# Patient Record
Sex: FEMALE | Race: WHITE | NOT HISPANIC OR LATINO | Employment: OTHER | URBAN - NONMETROPOLITAN AREA
[De-identification: names, ages, dates, MRNs, and addresses within clinical notes are randomized per-mention and may not be internally consistent; named-entity substitution may affect disease eponyms.]

---

## 2017-01-13 ENCOUNTER — TELEPHONE (OUTPATIENT)
Dept: OBSTETRICS AND GYNECOLOGY | Facility: CLINIC | Age: 62
End: 2017-01-13

## 2017-01-13 NOTE — TELEPHONE ENCOUNTER
Pt requesting a medication to be called in for low libido. Pt has not been seen since 12/2015. Informed she needs to come in to see Salud Suresh and then they can discuss the need for rx.

## 2017-01-24 ENCOUNTER — OFFICE VISIT (OUTPATIENT)
Dept: OBSTETRICS AND GYNECOLOGY | Facility: CLINIC | Age: 62
End: 2017-01-24

## 2017-01-24 VITALS
DIASTOLIC BLOOD PRESSURE: 84 MMHG | BODY MASS INDEX: 38.8 KG/M2 | WEIGHT: 219 LBS | HEIGHT: 63 IN | SYSTOLIC BLOOD PRESSURE: 136 MMHG

## 2017-01-24 DIAGNOSIS — N95.1 MENOPAUSAL STATE: ICD-10-CM

## 2017-01-24 DIAGNOSIS — R68.82 DECREASED LIBIDO: ICD-10-CM

## 2017-01-24 DIAGNOSIS — R53.83 FATIGUE, UNSPECIFIED TYPE: ICD-10-CM

## 2017-01-24 DIAGNOSIS — Z00.00 ANNUAL PHYSICAL EXAM: Primary | ICD-10-CM

## 2017-01-24 PROCEDURE — 99396 PREV VISIT EST AGE 40-64: CPT | Performed by: NURSE PRACTITIONER

## 2017-01-24 RX ORDER — ZOLPIDEM TARTRATE 5 MG/1
0.5 TABLET ORAL NIGHTLY PRN
COMMUNITY
End: 2019-06-14

## 2017-01-24 RX ORDER — CITALOPRAM 20 MG/1
TABLET ORAL
COMMUNITY
Start: 2017-01-02 | End: 2019-12-31

## 2017-01-24 RX ORDER — PANTOPRAZOLE SODIUM 40 MG/1
TABLET, DELAYED RELEASE ORAL
COMMUNITY
Start: 2016-12-19 | End: 2018-08-21

## 2017-01-24 RX ORDER — OMEGA-3S/DHA/EPA/FISH OIL/D3 300MG-1000
400 CAPSULE ORAL DAILY
COMMUNITY
End: 2018-08-29

## 2017-01-24 RX ORDER — LEVOTHYROXINE SODIUM 0.05 MG/1
50 TABLET ORAL DAILY
COMMUNITY
Start: 2016-12-03 | End: 2022-08-15

## 2017-01-24 RX ORDER — LOSARTAN POTASSIUM AND HYDROCHLOROTHIAZIDE 25; 100 MG/1; MG/1
TABLET ORAL
COMMUNITY
Start: 2017-01-03 | End: 2019-12-31

## 2017-01-24 RX ORDER — ROPINIROLE 0.5 MG/1
0.25 TABLET, FILM COATED ORAL DAILY
COMMUNITY
Start: 2016-12-19 | End: 2022-08-15

## 2017-01-24 RX ORDER — NICOTINE POLACRILEX 2 MG
1 LOZENGE BUCCAL DAILY
COMMUNITY
End: 2018-08-22

## 2017-01-24 RX ORDER — MONTELUKAST SODIUM 4 MG/1
1 TABLET, CHEWABLE ORAL DAILY
COMMUNITY
Start: 2016-12-03

## 2017-01-24 RX ORDER — ALLOPURINOL 100 MG/1
TABLET ORAL
COMMUNITY
Start: 2016-10-18 | End: 2019-12-31

## 2017-01-24 NOTE — MR AVS SNAPSHOT
Salud Daley   1/24/2017 1:00 PM   Office Visit    Dept Phone:  [phone]   Encounter #:  [id number]    Provider:  VIN Marcelo   Department:  [facility]                Your Full Care Plan              Your Updated Medication List          This list is accurate as of: 1/24/17  2:22 PM.  Always use your most recent med list.                allopurinol 100 MG tablet   Commonly known as:  ZYLOPRIM       AMBIEN 5 MG tablet   Generic drug:  zolpidem       B-12 5000 MCG sublingual tablet       cholecalciferol 400 UNITS tablet   Commonly known as:  VITAMIN D3       citalopram 20 MG tablet   Commonly known as:  CeleXA       colestipol 1 G tablet   Commonly known as:  COLESTID       Conj Estrogens-Bazedoxifene 0.45-20 MG tablet       levothyroxine 50 MCG tablet   Commonly known as:  SYNTHROID, LEVOTHROID       losartan-hydrochlorothiazide 100-25 MG per tablet   Commonly known as:  HYZAAR       pantoprazole 40 MG EC tablet   Commonly known as:  PROTONIX       rOPINIRole 0.5 MG tablet   Commonly known as:  REQUIP       ZICAM COLD REMEDY NA               We Performed the Following     Cortisol     DHEA-Sulfate     Estradiol     Estrone     Follicle Stimulating Hormone     Glucose, Plasma ([facility])     Hemoglobin A1c     Insulin, Random     Liquid-based Pap Smear, Screening     Luteinizing Hormone     Progesterone     T3, Free     T3, Reverse     T4, Free     Testosterone     Thyroid Antibodies     TSH     Vitamin D 25 Hydroxy       You Were Diagnosed With        Codes Comments    Annual physical exam    -  Primary ICD-10-CM: Z00.00  ICD-9-CM: V70.0 Normal well woman exam.  Mammogram done in August - wnl.     Menopausal state     ICD-10-CM: N95.1  ICD-9-CM: 627.2 Reports occasional hot flashes.  Hormone panel done today - will send to [facility] for compounding.      Decreased libido     ICD-10-CM: R68.82  ICD-9-CM: 799.81 Reports ongoing hypoactive sexual  desire.  Will do hormone panel today and follow up pending results.     Fatigue, unspecified type     ICD-10-CM: R53.83  ICD-9-CM: 780.79 Reports ongoing fatigue.  Lab work done today.  Follow up pending results.       Instructions     None    Patient Instructions History      Upcoming Appointments     Visit Type Date Time Department    PHYSICAL 2017  1:00 PM [facility] YANY KIM    [facility]      [employer] Signup     [facility] [employer] allows you to send messages to your doctor, view your test results, renew your prescriptions, schedule appointments, and more. To sign up, go to [employer] and click on the Sign Up Now link in the New User? box. Enter your [employer] Activation Code exactly as it appears below along with the last four digits of your Social Security Number and your Date of Birth () to complete the sign-up process. If you do not sign up before the expiration date, you must request a new code.    [employer] Activation Code: [email or web address]  Expires: 2017  2:22 PM    If you have questions, you can email [email or web address] or call [phone] to talk to our [employer] staff. Remember, [employer] is NOT to be used for urgent needs. For medical emergencies, dial 911.               Other Info from Your Visit           Your Appointments     2017  1:00 PM CDT   Office Visit with VIN Marcelo   [facility] (--)    [address] [place] [id number] [phone]           Arrive 15 minutes prior to appointment.              Allergies     Fluoxetine  Other (See Comments)    Adverse reaction-suicidal    Latex  Other (See Comments)    Gloves turn skin red    Nickel      Iodine  Rash      Reason for Visit     Gynecologic Exam Here for annual exam.  Had Hysterectomy for menorrhagia. Needs refills on her compounded hormones. Had mammogram Aug 2016. Wants to talk about low libido.        Vital  "Signs     Blood Pressure Height Weight Breastfeeding? Body Mass Index Smoking Status    136/84 (BP Location: Left arm, Patient Position: Sitting, Cuff Size: Adult) 63\" (160 cm) 219 lb (99.3 kg) No 38.79 kg/m2 Never Smoker      Problems and Diagnoses Noted     Annual physical exam    -  Primary    Menopausal state        Libido problem        Tiredness            "

## 2017-01-24 NOTE — PROGRESS NOTES
Subjective   Salud Daley is a 61 y.o. female  YOB: 1955    Est PT is here today for yearly visit.  Pt states that she is doing good with no c/o  Pt does need order for Mammo today as well      Chief Complaint   Patient presents with   • Gynecologic Exam     Here for annual exam.  Had Hysterectomy for menorrhagia. Needs refills on her compounded hormones. Had mammogram Aug 2016. Wants to talk about low libido.         HPI    The following portions of the patient's history were reviewed and updated as appropriate: allergies, current medications, past family history, past medical history, past social history, past surgical history and problem list.    Allergies   Allergen Reactions   • Fluoxetine Other (See Comments)     Adverse reaction-suicidal   • Latex Other (See Comments)     Gloves turn skin red   • Nickel    • Iodine Rash       Past Medical History   Diagnosis Date   • Asthma    • Hypertension    • Hypothyroid    • Insomnia    • Macular degeneration    • Restless leg syndrome        Family History   Problem Relation Age of Onset   • Breast cancer Neg Hx    • Ovarian cancer Neg Hx    • Colon cancer Neg Hx        Social History     Social History   • Marital status:      Spouse name: N/A   • Number of children: N/A   • Years of education: N/A     Occupational History   • Not on file.     Social History Main Topics   • Smoking status: Never Smoker   • Smokeless tobacco: Never Used   • Alcohol use Yes      Comment: Occasional   • Drug use: No   • Sexual activity: Not on file     Other Topics Concern   • Not on file     Social History Narrative         Current Outpatient Prescriptions:   •  allopurinol (ZYLOPRIM) 100 MG tablet, , Disp: , Rfl:   •  cholecalciferol (VITAMIN D3) 400 UNITS tablet, Take 400 Units by mouth Daily., Disp: , Rfl:   •  citalopram (CeleXA) 20 MG tablet, , Disp: , Rfl:   •  colestipol (COLESTID) 1 G tablet, , Disp: , Rfl:   •  Conj Estrogens-Bazedoxifene 0.45-20 MG  tablet, Take by mouth, Disp: , Rfl:   •  Cyanocobalamin (B-12) 5000 MCG sublingual tablet, Place 1 teaspoon(s) under the tongue Daily., Disp: , Rfl:   •  Homeopathic Products (ZICAM COLD REMEDY NA), 1 tablet/day into each nostril Daily., Disp: , Rfl:   •  levothyroxine (SYNTHROID, LEVOTHROID) 50 MCG tablet, , Disp: , Rfl:   •  losartan-hydrochlorothiazide (HYZAAR) 100-25 MG per tablet, , Disp: , Rfl:   •  pantoprazole (PROTONIX) 40 MG EC tablet, , Disp: , Rfl:   •  rOPINIRole (REQUIP) 0.5 MG tablet, , Disp: , Rfl:   •  zolpidem (AMBIEN) 5 MG tablet, Take 5 mg by mouth At Night As Needed for sleep (As needed)., Disp: , Rfl:     Menstrual History:  OB History      Para Term  AB TAB SAB Ectopic Multiple Living    1 0 0 0 0 0 0 0 1 2           No LMP recorded. Patient has had a hysterectomy.    Sexual History:         Could not be calculated    Past Surgical History   Procedure Laterality Date   • Ankle surgery Left    • Mole removal     • Endoscopy     • Cholecystectomy     • Colonoscopy     • Hysterectomy       Menorrhagia   • Cervical biopsy  w/ loop electrode excision     • Dilatation and curettage         Review of Systems   Constitutional: Negative for activity change, appetite change, chills, diaphoresis, fatigue, fever and unexpected weight change.   HENT: Negative for congestion, ear discharge, ear pain, facial swelling, hearing loss, mouth sores, nosebleeds, postnasal drip, rhinorrhea, sinus pressure, sneezing, sore throat, tinnitus, trouble swallowing and voice change.    Eyes: Negative for photophobia, pain, discharge, redness, itching and visual disturbance.   Respiratory: Negative for apnea, cough, choking, chest tightness and shortness of breath.    Cardiovascular: Negative for chest pain, palpitations and leg swelling.   Gastrointestinal: Negative for abdominal distention, abdominal pain, anal bleeding, blood in stool, constipation, diarrhea, nausea, rectal pain and vomiting.   Endocrine:  Negative for cold intolerance and heat intolerance.   Genitourinary: Negative for decreased urine volume, difficulty urinating, dyspareunia, flank pain, frequency, genital sores, hematuria, menstrual problem, pelvic pain, urgency, vaginal bleeding, vaginal discharge and vaginal pain.   Musculoskeletal: Negative for arthralgias, back pain, joint swelling and myalgias.   Skin: Negative for color change and rash.   Allergic/Immunologic: Negative for environmental allergies.   Neurological: Negative for dizziness, syncope, weakness, numbness and headaches.   Hematological: Negative for adenopathy.   Psychiatric/Behavioral: Negative for agitation, confusion and sleep disturbance. The patient is not nervous/anxious.        Objective   Physical Exam   Constitutional: She is oriented to person, place, and time. She appears well-developed and well-nourished.   HENT:   Head: Normocephalic.   Right Ear: External ear normal.   Left Ear: External ear normal.   Nose: Nose normal.   Mouth/Throat: Oropharynx is clear and moist.   Eyes: Conjunctivae are normal. Pupils are equal, round, and reactive to light.   Neck: Normal range of motion. Neck supple. Carotid bruit is not present. No thyromegaly present.   Cardiovascular: Regular rhythm, normal heart sounds and intact distal pulses.    No murmur heard.  Pulmonary/Chest: Effort normal and breath sounds normal. No respiratory distress. Right breast exhibits no inverted nipple, no mass, no nipple discharge, no skin change and no tenderness. Left breast exhibits no inverted nipple, no mass, no nipple discharge, no skin change and no tenderness. Breasts are symmetrical. There is no breast swelling.   Abdominal: Soft. Bowel sounds are normal. She exhibits no distension and no mass. There is no tenderness. There is no guarding. No hernia. Hernia confirmed negative in the right inguinal area and confirmed negative in the left inguinal area.   Genitourinary: Vagina normal. Rectal exam shows  "no external hemorrhoid, no internal hemorrhoid, no fissure, no mass, no tenderness and anal tone normal. No breast tenderness, discharge or bleeding. There is no rash, tenderness, lesion or injury on the right labia. There is no rash, tenderness, lesion or injury on the left labia. No vaginal discharge found.   Genitourinary Comments: Cervix, Uterus and Adnexa are surgically absent.  Urethra and urethral meatus normal  Bladder, normal no prolapse  Perineum and anus examined and without lesions   Musculoskeletal: Normal range of motion. She exhibits no edema or tenderness.   Lymphadenopathy:        Head (right side): No submental, no submandibular, no tonsillar, no preauricular, no posterior auricular and no occipital adenopathy present.        Head (left side): No submental, no submandibular, no tonsillar, no preauricular, no posterior auricular and no occipital adenopathy present.     She has no cervical adenopathy.        Right cervical: No superficial cervical, no deep cervical and no posterior cervical adenopathy present.       Left cervical: No superficial cervical, no deep cervical and no posterior cervical adenopathy present.     She has no axillary adenopathy.        Right: No inguinal adenopathy present.        Left: No inguinal adenopathy present.   Neurological: She is alert and oriented to person, place, and time. Coordination normal.   Skin: Skin is warm and dry. No bruising and no rash noted. No erythema.   Psychiatric: She has a normal mood and affect. Her behavior is normal. Judgment and thought content normal.   Nursing note and vitals reviewed.        Vitals:    01/24/17 1319   BP: 136/84   BP Location: Left arm   Patient Position: Sitting   Cuff Size: Adult   Weight: 219 lb (99.3 kg)   Height: 63\" (160 cm)       Salud was seen today for gynecologic exam.    Diagnoses and all orders for this visit:    Annual physical exam  Comments:  Normal well woman exam.  Mammogram done in August - wnl. "   Orders:  -     Liquid-based Pap Smear, Screening  -     Cortisol  -     DHEA-Sulfate  -     Estradiol  -     Follicle Stimulating Hormone  -     Luteinizing Hormone  -     Progesterone  -     Testosterone  -     Estrone  -     TSH  -     T3, Free  -     T4, Free  -     T3, Reverse  -     Thyroid Antibodies  -     Vitamin D 25 Hydroxy  -     Glucose, Plasma ([facility])  -     Insulin, Random  -     Hemoglobin A1c    Menopausal state  Comments:  Reports occasional hot flashes.  Hormone panel done today - will send to \[facility]\"" for compounding.    Orders:  -     Liquid-based Pap Smear, Screening  -     Cortisol  -     DHEA-Sulfate  -     Estradiol  -     Follicle Stimulating Hormone  -     Luteinizing Hormone  -     Progesterone  -     Testosterone  -     Estrone  -     TSH  -     T3, Free  -     T4, Free  -     T3, Reverse  -     Thyroid Antibodies  -     Vitamin D 25 Hydroxy  -     Glucose, Plasma ([facility])  -     Insulin, Random  -     Hemoglobin A1c    Decreased libido  Comments:  Reports ongoing hypoactive sexual desire.  Will do hormone panel today and follow up pending results.   Orders:  -     Estrone  -     TSH  -     T3, Free  -     T4, Free  -     T3, Reverse  -     Thyroid Antibodies  -     Vitamin D 25 Hydroxy  -     Glucose, Plasma ([facility])  -     Insulin, Random  -     Hemoglobin A1c    Fatigue, unspecified type  Comments:  Reports ongoing fatigue.  Lab work done today.  Follow up pending results.   Orders:  -     Estrone  -     TSH  -     T3, Free  -     T4, Free  -     T3, Reverse  -     Thyroid Antibodies  -     Vitamin D 25 Hydroxy  -     Glucose, Plasma ([facility])  -     Insulin, Random  -     Hemoglobin A1c        Body mass index is 38.79 kg/(m^2).     Non-Smoker    MyChart Instructions Given

## 2017-01-28 LAB
25(OH)D3+25(OH)D2 SERPL-MCNC: 31 NG/ML (ref 30–100)
CORTIS SERPL-MCNC: 7.8 UG/DL
DHEA-S SERPL-MCNC: 53.4 UG/DL (ref 29.4–220.5)
ESTRADIOL SERPL-MCNC: 21.5 PG/ML
ESTRONE SERPL-MCNC: 135 PG/ML
FSH SERPL-ACNC: 35.5 MIU/ML
GLUCOSE P FAST SERPL-MCNC: 82 MG/DL (ref 65–99)
HBA1C MFR BLD: 6.2 % (ref 4.8–5.6)
INSULIN SERPL-ACNC: 9.1 UIU/ML
LH SERPL-ACNC: 30.8 MIU/ML
PROGEST SERPL-MCNC: 0.3 NG/ML
T3FREE SERPL-MCNC: 2.5 PG/ML (ref 2–4.4)
T3REVERSE SERPL-MCNC: 20 NG/DL (ref 9.2–24.1)
T4 FREE SERPL-MCNC: 1.14 NG/DL (ref 0.82–1.77)
TESTOST SERPL-MCNC: 8 NG/DL (ref 3–41)
THYROGLOB AB SERPL-ACNC: <1 IU/ML (ref 0–0.9)
THYROPEROXIDASE AB SERPL-ACNC: 8 IU/ML (ref 0–34)
TSH SERPL DL<=0.005 MIU/L-ACNC: 2.81 UIU/ML (ref 0.45–4.5)

## 2017-01-30 ENCOUNTER — TELEPHONE (OUTPATIENT)
Dept: OBSTETRICS AND GYNECOLOGY | Facility: CLINIC | Age: 62
End: 2017-01-30

## 2017-01-30 NOTE — TELEPHONE ENCOUNTER
I left her a message about the hormone panel to [facility] and to expect a call from them.  Also left a message for her to call back for a discussion of her other labs with me.

## 2017-01-31 ENCOUNTER — TELEPHONE (OUTPATIENT)
Dept: OBSTETRICS AND GYNECOLOGY | Facility: CLINIC | Age: 62
End: 2017-01-31

## 2017-02-27 ENCOUNTER — ANESTHESIA EVENT (OUTPATIENT)
Dept: OPERATING ROOM | Age: 62
End: 2017-02-27

## 2017-02-28 ENCOUNTER — HOSPITAL ENCOUNTER (OUTPATIENT)
Dept: NON INVASIVE DIAGNOSTICS | Age: 62
Discharge: HOME | End: 2017-02-28
Payer: COMMERCIAL

## 2017-02-28 ENCOUNTER — HOSPITAL ENCOUNTER (OUTPATIENT)
Dept: LAB | Age: 62
Discharge: HOME | End: 2017-02-28
Payer: COMMERCIAL

## 2017-02-28 ENCOUNTER — HOSPITAL ENCOUNTER (OUTPATIENT)
Dept: PREADMISSION TESTING | Age: 62
Setting detail: OUTPATIENT SURGERY
Discharge: HOME | End: 2017-02-28

## 2017-02-28 VITALS — BODY MASS INDEX: 41.41 KG/M2 | HEIGHT: 62 IN | WEIGHT: 225 LBS

## 2017-02-28 DIAGNOSIS — Z01.818 PRE-OP TESTING: Primary | ICD-10-CM

## 2017-02-28 DIAGNOSIS — Z01.818 PRE-OP TESTING: ICD-10-CM

## 2017-02-28 LAB
ANION GAP SERPL CALCULATED.3IONS-SCNC: 18 MMOL/L (ref 7–19)
BUN BLDV-MCNC: 19 MG/DL (ref 8–23)
CALCIUM SERPL-MCNC: 8.8 MG/DL (ref 8.8–10.2)
CHLORIDE BLD-SCNC: 101 MMOL/L (ref 98–111)
CO2: 25 MMOL/L (ref 22–29)
CREAT SERPL-MCNC: 0.7 MG/DL (ref 0.5–0.9)
GFR NON-AFRICAN AMERICAN: >60
GLUCOSE BLD-MCNC: 110 MG/DL (ref 74–109)
POTASSIUM SERPL-SCNC: 4 MMOL/L (ref 3.5–5)
SODIUM BLD-SCNC: 144 MMOL/L (ref 136–145)

## 2017-02-28 PROCEDURE — 93005 ELECTROCARDIOGRAM TRACING: CPT

## 2017-03-09 ENCOUNTER — HOSPITAL ENCOUNTER (OUTPATIENT)
Age: 62
Setting detail: SPECIMEN
Discharge: HOME | End: 2017-03-09
Payer: COMMERCIAL

## 2017-03-09 ENCOUNTER — ANESTHESIA (OUTPATIENT)
Dept: OPERATING ROOM | Age: 62
End: 2017-03-09
Payer: COMMERCIAL

## 2017-03-09 ENCOUNTER — SURGERY (OUTPATIENT)
Age: 62
End: 2017-03-09

## 2017-03-09 ENCOUNTER — HOSPITAL ENCOUNTER (OUTPATIENT)
Age: 62
Setting detail: OUTPATIENT SURGERY
Discharge: HOME | End: 2017-03-09
Attending: ORTHOPAEDIC SURGERY | Admitting: ORTHOPAEDIC SURGERY

## 2017-03-09 VITALS
DIASTOLIC BLOOD PRESSURE: 62 MMHG | OXYGEN SATURATION: 92 % | RESPIRATION RATE: 16 BRPM | HEART RATE: 67 BPM | TEMPERATURE: 97.4 F | SYSTOLIC BLOOD PRESSURE: 104 MMHG

## 2017-03-09 VITALS
SYSTOLIC BLOOD PRESSURE: 90 MMHG | OXYGEN SATURATION: 64 % | RESPIRATION RATE: 16 BRPM | DIASTOLIC BLOOD PRESSURE: 52 MMHG

## 2017-03-09 PROBLEM — M67.40 GANGLION CYST: Status: ACTIVE | Noted: 2017-03-09

## 2017-03-09 PROCEDURE — 88304 TISSUE EXAM BY PATHOLOGIST: CPT

## 2017-03-09 PROCEDURE — 25111 REMOVE WRIST TENDON LESION: CPT

## 2017-03-09 PROCEDURE — 25075 EXC FOREARM LES SC < 3 CM: CPT

## 2017-03-09 PROCEDURE — 01810 ANES PX NRV MUSC F/ARM WRST: CPT | Performed by: NURSE ANESTHETIST, CERTIFIED REGISTERED

## 2017-03-09 PROCEDURE — 88307 TISSUE EXAM BY PATHOLOGIST: CPT

## 2017-03-09 PROCEDURE — G8916 PT W IV AB GIVEN ON TIME: HCPCS

## 2017-03-09 PROCEDURE — G8907 PT DOC NO EVENTS ON DISCHARG: HCPCS

## 2017-03-09 RX ORDER — FENTANYL CITRATE 50 UG/ML
50 INJECTION, SOLUTION INTRAMUSCULAR; INTRAVENOUS EVERY 5 MIN PRN
Status: DISCONTINUED | OUTPATIENT
Start: 2017-03-09 | End: 2017-03-09 | Stop reason: HOSPADM

## 2017-03-09 RX ORDER — OXYCODONE HYDROCHLORIDE AND ACETAMINOPHEN 5; 325 MG/1; MG/1
2 TABLET ORAL PRN
Status: DISCONTINUED | OUTPATIENT
Start: 2017-03-09 | End: 2017-03-09 | Stop reason: HOSPADM

## 2017-03-09 RX ORDER — FENTANYL CITRATE 50 UG/ML
INJECTION, SOLUTION INTRAMUSCULAR; INTRAVENOUS PRN
Status: DISCONTINUED | OUTPATIENT
Start: 2017-03-09 | End: 2017-03-09 | Stop reason: SDUPTHER

## 2017-03-09 RX ORDER — HYDROCODONE BITARTRATE AND ACETAMINOPHEN 7.5; 325 MG/1; MG/1
1 TABLET ORAL EVERY 4 HOURS PRN
Qty: 60 TABLET | Refills: 0 | Status: SHIPPED | OUTPATIENT
Start: 2017-03-09 | End: 2017-03-16

## 2017-03-09 RX ORDER — BUPIVACAINE HYDROCHLORIDE 2.5 MG/ML
INJECTION, SOLUTION EPIDURAL; INFILTRATION; INTRACAUDAL PRN
Status: DISCONTINUED | OUTPATIENT
Start: 2017-03-09 | End: 2017-03-09 | Stop reason: HOSPADM

## 2017-03-09 RX ORDER — OXYCODONE HYDROCHLORIDE AND ACETAMINOPHEN 5; 325 MG/1; MG/1
1 TABLET ORAL PRN
Status: DISCONTINUED | OUTPATIENT
Start: 2017-03-09 | End: 2017-03-09 | Stop reason: HOSPADM

## 2017-03-09 RX ORDER — PROPOFOL 10 MG/ML
INJECTION, EMULSION INTRAVENOUS PRN
Status: DISCONTINUED | OUTPATIENT
Start: 2017-03-09 | End: 2017-03-09 | Stop reason: SDUPTHER

## 2017-03-09 RX ORDER — SODIUM CHLORIDE, SODIUM LACTATE, POTASSIUM CHLORIDE, CALCIUM CHLORIDE 600; 310; 30; 20 MG/100ML; MG/100ML; MG/100ML; MG/100ML
INJECTION, SOLUTION INTRAVENOUS CONTINUOUS
Status: DISCONTINUED | OUTPATIENT
Start: 2017-03-09 | End: 2017-03-09 | Stop reason: HOSPADM

## 2017-03-09 RX ORDER — CEFAZOLIN SODIUM 1 G/3ML
INJECTION, POWDER, FOR SOLUTION INTRAMUSCULAR; INTRAVENOUS PRN
Status: DISCONTINUED | OUTPATIENT
Start: 2017-03-09 | End: 2017-03-09 | Stop reason: SDUPTHER

## 2017-03-09 RX ORDER — HYDRALAZINE HYDROCHLORIDE 20 MG/ML
5 INJECTION INTRAMUSCULAR; INTRAVENOUS EVERY 10 MIN PRN
Status: DISCONTINUED | OUTPATIENT
Start: 2017-03-09 | End: 2017-03-09 | Stop reason: HOSPADM

## 2017-03-09 RX ORDER — LOSARTAN POTASSIUM AND HYDROCHLOROTHIAZIDE 25; 100 MG/1; MG/1
1 TABLET ORAL DAILY
COMMUNITY
End: 2022-06-03

## 2017-03-09 RX ORDER — HYDROMORPHONE HCL 110MG/55ML
0.25 PATIENT CONTROLLED ANALGESIA SYRINGE INTRAVENOUS EVERY 5 MIN PRN
Status: DISCONTINUED | OUTPATIENT
Start: 2017-03-09 | End: 2017-03-09 | Stop reason: HOSPADM

## 2017-03-09 RX ORDER — MEPERIDINE HYDROCHLORIDE 25 MG/ML
12.5 INJECTION INTRAMUSCULAR; INTRAVENOUS; SUBCUTANEOUS EVERY 5 MIN PRN
Status: DISCONTINUED | OUTPATIENT
Start: 2017-03-09 | End: 2017-03-09 | Stop reason: HOSPADM

## 2017-03-09 RX ORDER — HYDROCODONE BITARTRATE AND ACETAMINOPHEN 5; 325 MG/1; MG/1
2 TABLET ORAL PRN
Status: DISCONTINUED | OUTPATIENT
Start: 2017-03-09 | End: 2017-03-09 | Stop reason: HOSPADM

## 2017-03-09 RX ORDER — DIPHENHYDRAMINE HYDROCHLORIDE 50 MG/ML
12.5 INJECTION INTRAMUSCULAR; INTRAVENOUS
Status: DISCONTINUED | OUTPATIENT
Start: 2017-03-09 | End: 2017-03-09 | Stop reason: HOSPADM

## 2017-03-09 RX ORDER — SODIUM CHLORIDE, SODIUM LACTATE, POTASSIUM CHLORIDE, CALCIUM CHLORIDE 600; 310; 30; 20 MG/100ML; MG/100ML; MG/100ML; MG/100ML
INJECTION, SOLUTION INTRAVENOUS CONTINUOUS PRN
Status: DISCONTINUED | OUTPATIENT
Start: 2017-03-09 | End: 2017-03-09 | Stop reason: SDUPTHER

## 2017-03-09 RX ORDER — HYDROCODONE BITARTRATE AND ACETAMINOPHEN 5; 325 MG/1; MG/1
1 TABLET ORAL PRN
Status: DISCONTINUED | OUTPATIENT
Start: 2017-03-09 | End: 2017-03-09 | Stop reason: HOSPADM

## 2017-03-09 RX ORDER — LIDOCAINE HYDROCHLORIDE 10 MG/ML
1 INJECTION, SOLUTION EPIDURAL; INFILTRATION; INTRACAUDAL; PERINEURAL
Status: DISCONTINUED | OUTPATIENT
Start: 2017-03-09 | End: 2017-03-09 | Stop reason: HOSPADM

## 2017-03-09 RX ORDER — ONDANSETRON 2 MG/ML
4 INJECTION INTRAMUSCULAR; INTRAVENOUS
Status: DISCONTINUED | OUTPATIENT
Start: 2017-03-09 | End: 2017-03-09 | Stop reason: HOSPADM

## 2017-03-09 RX ORDER — HYDROMORPHONE HCL 110MG/55ML
0.5 PATIENT CONTROLLED ANALGESIA SYRINGE INTRAVENOUS EVERY 5 MIN PRN
Status: DISCONTINUED | OUTPATIENT
Start: 2017-03-09 | End: 2017-03-09 | Stop reason: HOSPADM

## 2017-03-09 RX ORDER — LABETALOL HYDROCHLORIDE 5 MG/ML
5 INJECTION, SOLUTION INTRAVENOUS EVERY 10 MIN PRN
Status: DISCONTINUED | OUTPATIENT
Start: 2017-03-09 | End: 2017-03-09 | Stop reason: HOSPADM

## 2017-03-09 RX ORDER — PROMETHAZINE HYDROCHLORIDE 25 MG/ML
12.5 INJECTION, SOLUTION INTRAMUSCULAR; INTRAVENOUS
Status: DISCONTINUED | OUTPATIENT
Start: 2017-03-09 | End: 2017-03-09 | Stop reason: HOSPADM

## 2017-03-09 RX ORDER — MIDAZOLAM HYDROCHLORIDE 1 MG/ML
INJECTION INTRAMUSCULAR; INTRAVENOUS PRN
Status: DISCONTINUED | OUTPATIENT
Start: 2017-03-09 | End: 2017-03-09 | Stop reason: SDUPTHER

## 2017-03-09 RX ORDER — ONDANSETRON 2 MG/ML
INJECTION INTRAMUSCULAR; INTRAVENOUS PRN
Status: DISCONTINUED | OUTPATIENT
Start: 2017-03-09 | End: 2017-03-09 | Stop reason: SDUPTHER

## 2017-03-09 RX ADMIN — SODIUM CHLORIDE, SODIUM LACTATE, POTASSIUM CHLORIDE, CALCIUM CHLORIDE: 600; 310; 30; 20 INJECTION, SOLUTION INTRAVENOUS at 07:57

## 2017-03-09 RX ADMIN — BUPIVACAINE HYDROCHLORIDE 8 ML: 2.5 INJECTION, SOLUTION EPIDURAL; INFILTRATION; INTRACAUDAL at 08:40

## 2017-03-09 RX ADMIN — SODIUM CHLORIDE, SODIUM LACTATE, POTASSIUM CHLORIDE, CALCIUM CHLORIDE: 600; 310; 30; 20 INJECTION, SOLUTION INTRAVENOUS at 07:00

## 2017-03-09 RX ADMIN — ONDANSETRON 4 MG: 2 INJECTION INTRAMUSCULAR; INTRAVENOUS at 08:24

## 2017-03-09 RX ADMIN — PROPOFOL 120 MG: 10 INJECTION, EMULSION INTRAVENOUS at 08:09

## 2017-03-09 RX ADMIN — MIDAZOLAM HYDROCHLORIDE 2 MG: 1 INJECTION INTRAMUSCULAR; INTRAVENOUS at 07:57

## 2017-03-09 RX ADMIN — CEFAZOLIN SODIUM 1000 MG: 1 INJECTION, POWDER, FOR SOLUTION INTRAMUSCULAR; INTRAVENOUS at 07:57

## 2017-03-09 RX ADMIN — FENTANYL CITRATE 100 MCG: 50 INJECTION, SOLUTION INTRAMUSCULAR; INTRAVENOUS at 08:03

## 2017-03-09 RX ADMIN — Medication 0.25 MG: at 09:37

## 2017-03-09 ASSESSMENT — PAIN SCALES - GENERAL: PAINLEVEL_OUTOF10: 8

## 2017-04-25 ENCOUNTER — OFFICE VISIT (OUTPATIENT)
Dept: OBSTETRICS AND GYNECOLOGY | Facility: CLINIC | Age: 62
End: 2017-04-25

## 2017-04-25 VITALS
SYSTOLIC BLOOD PRESSURE: 138 MMHG | HEIGHT: 63 IN | DIASTOLIC BLOOD PRESSURE: 80 MMHG | BODY MASS INDEX: 36.14 KG/M2 | WEIGHT: 204 LBS

## 2017-04-25 DIAGNOSIS — E61.1 LOW IRON: ICD-10-CM

## 2017-04-25 DIAGNOSIS — N95.1 MENOPAUSAL STATE: Primary | ICD-10-CM

## 2017-04-25 DIAGNOSIS — E55.9 VITAMIN D DEFICIENCY: ICD-10-CM

## 2017-04-25 DIAGNOSIS — E53.8 B12 DEFICIENCY: ICD-10-CM

## 2017-04-25 PROCEDURE — 99213 OFFICE O/P EST LOW 20 MIN: CPT | Performed by: NURSE PRACTITIONER

## 2017-04-25 RX ORDER — FERROUS SULFATE 325(65) MG
TABLET ORAL
COMMUNITY
End: 2018-08-29

## 2017-04-25 NOTE — PROGRESS NOTES
Subjective   Salud aDley is a 61 y.o. female.     History of Present Illness    The following portions of the patient's history were reviewed and updated as appropriate: allergies, current medications, past family history, past medical history, past social history, past surgical history and problem list.    Review of Systems   Constitutional: Negative for activity change, appetite change, chills, diaphoresis, fatigue, fever and unexpected weight change.   HENT: Negative for congestion, ear discharge, ear pain, facial swelling, hearing loss, mouth sores, nosebleeds, postnasal drip, rhinorrhea, sinus pressure, sneezing, sore throat, tinnitus, trouble swallowing and voice change.    Eyes: Negative for photophobia, pain, discharge, redness, itching and visual disturbance.   Respiratory: Negative for apnea, cough, choking, chest tightness and shortness of breath.    Cardiovascular: Negative for chest pain, palpitations and leg swelling.   Gastrointestinal: Negative for abdominal distention, abdominal pain, anal bleeding, blood in stool, constipation, diarrhea, nausea, rectal pain and vomiting.   Endocrine: Negative for cold intolerance and heat intolerance.   Genitourinary: Negative for decreased urine volume, difficulty urinating, dyspareunia, flank pain, frequency, genital sores, hematuria, menstrual problem, pelvic pain, urgency, vaginal bleeding, vaginal discharge and vaginal pain.   Musculoskeletal: Negative for arthralgias, back pain, joint swelling and myalgias.   Skin: Negative for color change and rash.   Allergic/Immunologic: Negative for environmental allergies.   Neurological: Negative for dizziness, syncope, weakness, numbness and headaches.   Hematological: Negative for adenopathy.   Psychiatric/Behavioral: Negative for agitation, confusion and sleep disturbance. The patient is not nervous/anxious.        Objective   Physical Exam    Assessment/Plan   Salud was seen today for menopause.    Diagnoses and  all orders for this visit:    Menopausal state  Comments:  Doing well on compounded hormones.  Hormone panel today and will refill based on results.   Orders:  -     Cortisol  -     DHEA-Sulfate  -     Estradiol  -     Follicle Stimulating Hormone  -     Luteinizing Hormone  -     Progesterone  -     Testosterone    Vitamin D deficiency  Comments:  To repeat vitamin D level today.   Orders:  -     Vitamin D 25 Hydroxy    B12 deficiency  Comments:  Will check B12 level today.   Orders:  -     Vitamin B12    Low iron  Comments:  Hemoglobin done today.   Orders:  -     Hemoglobin

## 2017-04-26 LAB
25(OH)D3+25(OH)D2 SERPL-MCNC: 28.3 NG/ML (ref 30–100)
CORTIS SERPL-MCNC: 8.2 UG/DL
DHEA-S SERPL-MCNC: 61.3 UG/DL (ref 29.4–220.5)
ESTRADIOL SERPL-MCNC: 56.6 PG/ML
FSH SERPL-ACNC: 35.3 MIU/ML
HGB BLD-MCNC: 14.6 G/DL (ref 12–16)
LH SERPL-ACNC: 25.6 MIU/ML
PROGEST SERPL-MCNC: 2.9 NG/ML
TESTOST SERPL-MCNC: 26 NG/DL (ref 3–41)
VIT B12 SERPL-MCNC: >1000 PG/ML (ref 239–931)

## 2017-05-02 ENCOUNTER — APPOINTMENT (OUTPATIENT)
Dept: GENERAL RADIOLOGY | Facility: HOSPITAL | Age: 62
End: 2017-05-02

## 2017-05-02 PROCEDURE — 73610 X-RAY EXAM OF ANKLE: CPT

## 2017-07-11 DIAGNOSIS — E55.9 VITAMIN D DEFICIENCY: Primary | ICD-10-CM

## 2017-07-12 LAB — 25(OH)D3+25(OH)D2 SERPL-MCNC: 45.9 NG/ML (ref 30–100)

## 2017-07-25 ENCOUNTER — OFFICE VISIT (OUTPATIENT)
Dept: OBSTETRICS AND GYNECOLOGY | Facility: CLINIC | Age: 62
End: 2017-07-25

## 2017-07-25 VITALS
SYSTOLIC BLOOD PRESSURE: 120 MMHG | HEIGHT: 63 IN | WEIGHT: 199 LBS | BODY MASS INDEX: 35.26 KG/M2 | DIASTOLIC BLOOD PRESSURE: 76 MMHG

## 2017-07-25 DIAGNOSIS — N95.1 MENOPAUSAL STATE: Primary | ICD-10-CM

## 2017-07-25 PROCEDURE — 99213 OFFICE O/P EST LOW 20 MIN: CPT | Performed by: NURSE PRACTITIONER

## 2017-07-25 NOTE — PROGRESS NOTES
Subjective   Salud Daley is a 61 y.o. female.     History of Present Illness    The following portions of the patient's history were reviewed and updated as appropriate: allergies, current medications, past family history, past medical history, past social history, past surgical history and problem list.    Review of Systems   Constitutional: Negative for activity change, appetite change, chills, diaphoresis, fatigue, fever and unexpected weight change.   HENT: Negative for congestion, ear discharge, ear pain, facial swelling, hearing loss, mouth sores, nosebleeds, postnasal drip, rhinorrhea, sinus pressure, sneezing, sore throat, tinnitus, trouble swallowing and voice change.    Eyes: Negative for photophobia, pain, discharge, redness, itching and visual disturbance.   Respiratory: Negative for apnea, cough, choking, chest tightness and shortness of breath.    Cardiovascular: Negative for chest pain, palpitations and leg swelling.   Gastrointestinal: Negative for abdominal distention, abdominal pain, anal bleeding, blood in stool, constipation, diarrhea, nausea, rectal pain and vomiting.   Endocrine: Negative for cold intolerance and heat intolerance.   Genitourinary: Negative for decreased urine volume, difficulty urinating, dyspareunia, flank pain, frequency, genital sores, hematuria, menstrual problem, pelvic pain, urgency, vaginal bleeding, vaginal discharge and vaginal pain.   Musculoskeletal: Negative for arthralgias, back pain, joint swelling and myalgias.   Skin: Negative for color change and rash.   Allergic/Immunologic: Negative for environmental allergies.   Neurological: Negative for dizziness, syncope, weakness, numbness and headaches.   Hematological: Negative for adenopathy.   Psychiatric/Behavioral: Negative for agitation, confusion and sleep disturbance. The patient is not nervous/anxious.        Objective   Physical Exam   Constitutional: She is oriented to person, place, and time. She appears  well-developed and well-nourished.   Cardiovascular: Normal rate and regular rhythm.    Pulmonary/Chest: Effort normal and breath sounds normal.   Neurological: She is alert and oriented to person, place, and time.   Psychiatric: She has a normal mood and affect. Her behavior is normal.   Nursing note and vitals reviewed.      Assessment/Plan   Salud was seen today for menopause.    Diagnoses and all orders for this visit:    Menopausal state  Comments:  Patient doing well on her compounded hormones and wants to remain on it.  Refill called to the pharmacy.      BMI 35.0-35.9,adult  Comments:  Patient wants to discuss weight loss options.  Wants to try Saxenda.  Thoroughly discussed risks/benefits and proper use.  RX sent to pharmacy.  RTO 4 weeks for recheck or sooner prn.    Orders:  -     Insulin Pen Needle (NOVOFINE) 32G X 6 MM misc; 1 each Daily.  -     Liraglutide -Weight Management (SAXENDA) 18 MG/3ML solution pen-injector; Inject 3 mg under the skin Daily. 0.6 mg x 1 week, 1.2 mg x 1 week, 1.8 mg x 1 week, 2.4 mg x 1 week, then 3 mg daily

## 2017-08-01 ENCOUNTER — OFFICE VISIT (OUTPATIENT)
Dept: NEUROLOGY | Age: 62
End: 2017-08-01
Payer: COMMERCIAL

## 2017-08-01 VITALS
SYSTOLIC BLOOD PRESSURE: 124 MMHG | DIASTOLIC BLOOD PRESSURE: 67 MMHG | RESPIRATION RATE: 16 BRPM | WEIGHT: 202.4 LBS | HEART RATE: 67 BPM | BODY MASS INDEX: 37.25 KG/M2 | HEIGHT: 62 IN

## 2017-08-01 DIAGNOSIS — G47.33 SLEEP APNEA, OBSTRUCTIVE: Primary | ICD-10-CM

## 2017-08-01 PROCEDURE — 99213 OFFICE O/P EST LOW 20 MIN: CPT | Performed by: PSYCHIATRY & NEUROLOGY

## 2017-08-01 RX ORDER — CHOLECALCIFEROL (VITAMIN D3) 1250 MCG
CAPSULE ORAL WEEKLY
COMMUNITY
End: 2018-08-10

## 2017-08-01 RX ORDER — LANOLIN ALCOHOL/MO/W.PET/CERES
1000 CREAM (GRAM) TOPICAL DAILY
COMMUNITY
End: 2018-08-10

## 2017-08-01 RX ORDER — PANTOPRAZOLE SODIUM 40 MG/1
40 TABLET, DELAYED RELEASE ORAL DAILY
COMMUNITY
End: 2018-08-10

## 2017-08-01 RX ORDER — ALLOPURINOL 100 MG/1
100 TABLET ORAL DAILY
COMMUNITY

## 2017-08-10 ENCOUNTER — TELEPHONE (OUTPATIENT)
Dept: OBSTETRICS AND GYNECOLOGY | Facility: CLINIC | Age: 62
End: 2017-08-10

## 2017-08-10 NOTE — TELEPHONE ENCOUNTER
Patient called and states that Saxenda isn't covered under her insurance. Patient states she has been put on a diabetic diet and medication recently by PCP.. Patient states she has lost roughly 30lbs since then. Patient opted to keep appointment in October to discuss other options of weight management.

## 2017-10-11 ENCOUNTER — APPOINTMENT (OUTPATIENT)
Dept: LAB | Facility: HOSPITAL | Age: 62
End: 2017-10-11
Attending: INTERNAL MEDICINE

## 2017-10-11 ENCOUNTER — TRANSCRIBE ORDERS (OUTPATIENT)
Dept: ADMINISTRATIVE | Facility: HOSPITAL | Age: 62
End: 2017-10-11

## 2017-10-11 DIAGNOSIS — N20.0 URIC ACID NEPHROLITHIASIS: ICD-10-CM

## 2017-10-11 DIAGNOSIS — E79.0 URICACIDEMIA: ICD-10-CM

## 2017-10-11 DIAGNOSIS — I10 HYPERTENSION, ESSENTIAL: ICD-10-CM

## 2017-10-11 DIAGNOSIS — R31.29 MICROSCOPIC HEMATURIA: Primary | ICD-10-CM

## 2017-10-11 LAB
BILIRUB UR QL STRIP: NEGATIVE
CLARITY UR: CLEAR
COLOR UR: YELLOW
GLUCOSE UR STRIP-MCNC: NEGATIVE MG/DL
HGB UR QL STRIP.AUTO: NEGATIVE
KETONES UR QL STRIP: NEGATIVE
LEUKOCYTE ESTERASE UR QL STRIP.AUTO: NEGATIVE
NITRITE UR QL STRIP: NEGATIVE
PH UR STRIP.AUTO: 5.5 [PH] (ref 5–8)
PROT UR QL STRIP: NEGATIVE
SP GR UR STRIP: <=1.005 (ref 1–1.03)
UROBILINOGEN UR QL STRIP: NORMAL

## 2017-10-11 PROCEDURE — 36415 COLL VENOUS BLD VENIPUNCTURE: CPT

## 2017-10-11 PROCEDURE — 80069 RENAL FUNCTION PANEL: CPT | Performed by: INTERNAL MEDICINE

## 2017-10-11 PROCEDURE — 81003 URINALYSIS AUTO W/O SCOPE: CPT | Performed by: INTERNAL MEDICINE

## 2017-10-11 PROCEDURE — 84550 ASSAY OF BLOOD/URIC ACID: CPT | Performed by: INTERNAL MEDICINE

## 2017-10-11 PROCEDURE — 83735 ASSAY OF MAGNESIUM: CPT | Performed by: INTERNAL MEDICINE

## 2017-10-12 LAB
ALBUMIN SERPL-MCNC: 4.3 G/DL (ref 3.5–5)
ANION GAP SERPL CALCULATED.3IONS-SCNC: 15 MMOL/L (ref 4–13)
BUN BLD-MCNC: 23 MG/DL (ref 5–21)
BUN/CREAT SERPL: 32.9 (ref 7–25)
CALCIUM SPEC-SCNC: 9.2 MG/DL (ref 8.4–10.4)
CHLORIDE SERPL-SCNC: 97 MMOL/L (ref 98–110)
CO2 SERPL-SCNC: 28 MMOL/L (ref 24–31)
CREAT BLD-MCNC: 0.7 MG/DL (ref 0.5–1.4)
GFR SERPL CREATININE-BSD FRML MDRD: 85 ML/MIN/1.73
GLUCOSE BLD-MCNC: 104 MG/DL (ref 70–100)
MAGNESIUM SERPL-MCNC: 2 MG/DL (ref 1.4–2.2)
PHOSPHATE SERPL-MCNC: 3.2 MG/DL (ref 2.5–4.5)
POTASSIUM BLD-SCNC: 3.3 MMOL/L (ref 3.5–5.3)
SODIUM BLD-SCNC: 140 MMOL/L (ref 135–145)
URATE SERPL-MCNC: 6.1 MG/DL (ref 2.7–7.5)

## 2017-10-23 ENCOUNTER — TELEPHONE (OUTPATIENT)
Dept: NEUROLOGY | Age: 62
End: 2017-10-23

## 2017-10-23 DIAGNOSIS — G47.33 OBSTRUCTIVE SLEEP APNEA: Primary | ICD-10-CM

## 2018-07-03 ENCOUNTER — TELEPHONE (OUTPATIENT)
Dept: NEUROLOGY | Age: 63
End: 2018-07-03

## 2018-07-03 DIAGNOSIS — G47.33 OBSTRUCTIVE SLEEP APNEA: Primary | ICD-10-CM

## 2018-07-03 NOTE — TELEPHONE ENCOUNTER
Patient called and stated that she feels like she needs to turn the pressure down on her C PAP Machine.   It has been on 12 but she wants to move it to 10, she was last seen in the office on 8/1/2017  Please advise

## 2018-08-10 ENCOUNTER — OFFICE VISIT (OUTPATIENT)
Dept: NEUROLOGY | Age: 63
End: 2018-08-10
Payer: COMMERCIAL

## 2018-08-10 VITALS
SYSTOLIC BLOOD PRESSURE: 124 MMHG | HEIGHT: 62 IN | DIASTOLIC BLOOD PRESSURE: 79 MMHG | WEIGHT: 185 LBS | BODY MASS INDEX: 34.04 KG/M2 | HEART RATE: 88 BPM | OXYGEN SATURATION: 97 %

## 2018-08-10 DIAGNOSIS — G47.33 OSA (OBSTRUCTIVE SLEEP APNEA): Primary | ICD-10-CM

## 2018-08-10 DIAGNOSIS — Z99.89 CPAP (CONTINUOUS POSITIVE AIRWAY PRESSURE) DEPENDENCE: ICD-10-CM

## 2018-08-10 PROCEDURE — 99213 OFFICE O/P EST LOW 20 MIN: CPT | Performed by: PHYSICIAN ASSISTANT

## 2018-08-10 PROCEDURE — G8417 CALC BMI ABV UP PARAM F/U: HCPCS | Performed by: PHYSICIAN ASSISTANT

## 2018-08-10 PROCEDURE — G8427 DOCREV CUR MEDS BY ELIG CLIN: HCPCS | Performed by: PHYSICIAN ASSISTANT

## 2018-08-10 PROCEDURE — 3017F COLORECTAL CA SCREEN DOC REV: CPT | Performed by: PHYSICIAN ASSISTANT

## 2018-08-10 PROCEDURE — 1036F TOBACCO NON-USER: CPT | Performed by: PHYSICIAN ASSISTANT

## 2018-08-10 NOTE — PROGRESS NOTES
REVIEW OF SYSTEMS    Constitutional: []Fever []Sweats []Chills [] Recent Injury   [x] Denies all unless marked  HENT:[]Headache  [] Head Injury  [] Sore Throat  [] Ear Pain  [] Dizziness [] Hearing Loss   [x] Denies all unless marked  Spine:  [] Neck pain  [] Back pain  [] Sciaticia  [x] Denies all unless marked  Cardiovascular:[]Chest Pain []Palpitations [] Heart Disease  [x] Denies all unless marked  Pulmonary: []Shortness of Breath []Cough   [x] Denies all unless marked  Gastrointestinal:  []Abdominal Pain  []Blood in Stool  []Diarrhea []Constipation []Nausea  []Vomiting  [x] Denies all unless marked  Genitourinary:  [] Dysuria [] Frequency  [] Incontinence [] Urgency   [x] Denies all unless marked  Musculoskeletal: [] Arthralgia  [] Myalgias [] Muscle cramps  [] Muscle twitches   [x] Denies all unless marked   Extremities:   [] Pain   [] Swelling   [x] Denies all unless marked  Skin:[] Rash  [] Color Change  [x] Denies all unless marked  Neurological:[] Visual Disturbance [] Double Vision [] Slurred Speech [] Trouble swallowing  [] Vertigo [] Tingling [] Numbness [] Weakness [] Loss of Balance   [] Loss of Consciousness [] Memory Loss [] Seizures  [x] Denies all unless marked  Psychiatric/Behavioral:[] Depression [] Anxiety  [x] Denies all unless marked  Sleep: [x]  Insomnia [] Sleep Disturbance [] Snoring [x] Restless Legs [] Daytime Sleepiness [x] Sleep Apnea  [] Denies all unless marked

## 2018-08-10 NOTE — PROGRESS NOTES
[facility] Sleep Follow Up Encounter      Information:   Patient Name: Javy Fernandez  :   1955  Age:   58 y.o. MRN:   [id number]  Account #:  [de-identified]  Today:                8/10/18    Provider:  Shona Alarcon PA-C    Chief Complaint   Patient presents with    Follow-up     1 yr f/u, NATALIYA, pt states everything is going ok        Subjective:   Javy Fernandez is a 58 y.o. female  with a history of NATALIYA who comes in for a sleep clinic follow up. The PSG,  revealed an AHI of 12.1. She is prescribed CPAP therapy with a pressure of 12cm. The compliance report indicates that she is averaging 4 hours of CPAP use per day. She averages 4 hours of sleep per night. She has been taking care of her  who had surgery and has had frequent awakenings and doesn't get much sleep. She reports that consistent CPAP use has alleviated the previous NATALIYA symptoms but she c/o of pressure intolerance and a dry mouth   She made a request to lower the pressure in July. Dr. Anh Yates agreed to lower the pressure but it remains on 12cm. She might like to try another mask.     Location or symptom:  NATALIYA  Onset:  PS   Timing:  q hs  Severity:  Mild  Associated:  Snoring, witnessed apneas, and excessive daytime somnolence  Alleviated:  CPAP      Objective:     Past Medical History:   Diagnosis Date    Anemia     Anxiety state, unspecified     Arthritis     Asthma     Atypical chest pain     Colon polyps     Cough     CPAP (continuous positive airway pressure) dependence     12cm    Dermatitis     Extrinsic asthma, unspecified     saw Dr Nathalie Foreman, was told to quit medication     Generalized anxiety disorder     GERD (gastroesophageal reflux disease)     Hypertension     Hypothyroidism     Liver hemangioma     Obstructive sleep apnea (adult) (pediatric)     NATLAIYA (obstructive sleep apnea)     AHI: 12.1 per PSG, 2013    Other malaise and fatigue     Primary localized osteoarthrosis, lower leg     RLS (restless but are not limited to  hypertension, coronary artery disease, diabetes, stroke, weight gain, impaired cognition, daytime somnolence,  and motor vehicle accidents. Advised to abstain from driving or operating heavy machinery when drowsy and the use of respiratory suppressants. 2.  The following educational material has been included in this visit after visit summary for your review: NATALIYA/PAP guidelines-Discussed with the patient and all questions fully answered. 3.  Increase duration of CPAP use  4. Decrease CPAP pressure to 10cm-[facility]  5. Try a Dreamwear FFM  6. Follow up in 1 year      Note:  A total of >50% (>8 minutes) of 15 minutes was spent discussing the pathophysiology and treatment and/or coordination of care of the above diagnoses.

## 2018-08-10 NOTE — PATIENT INSTRUCTIONS
Patient education: Sleep apnea in adults       INTRODUCTION  Normally during sleep, air moves through the throat and in and out of the lungs at a regular rhythm. In a person with sleep apnea, air movement is periodically diminished or stopped. There are two types of sleep apnea: obstructive sleep apnea and central sleep apnea. In obstructive sleep apnea, breathing is abnormal because of narrowing or closure of the throat. In central sleep apnea, breathing is abnormal because of a change in the breathing control and rhythm. Sleep apnea is a serious condition that can affect a person's ability to safely perform normal daily activities and can affect long term health. Approximately 25 percent of adults are at risk for sleep apnea of some degree. Men are more commonly affected than women. Other risk factors include middle and older age, being overweight or obese, and having a small mouth and throat. This topic review focuses on the most common type of sleep apnea in adults, obstructive sleep apnea (NATALIYA). HOW SLEEP APNEA OCCURS  The throat is surrounded by muscles that control the airway for speaking, swallowing, and breathing. During sleep, these muscles are less active, and this causes the throat to narrow. In most people, this narrowing does not affect breathing. In others, it can cause snoring, sometimes with reduced or completely blocked airflow. A completely blocked airway without airflow is called an obstructive apnea. Partial obstruction with diminished airflow is called a hypopnea. A person may have apnea and hypopnea during sleep. Insufficient breathing due to apnea or hypopnea causes oxygen levels to fall and carbon dioxide to rise. Because the airway is blocked, breathing faster or harder does not help to improve oxygen levels until the airway is reopened. Typically, the obstruction requires the person to awaken to activate the upper airway muscles.  Once the airway is opened, the person then takes several deep breaths to catch up on breathing. As the person awakens, he or she may move briefly, snort or snore, and take a deep breath. Less frequently, a person may awaken completely with a sensation of gasping, smothering, or choking. If the person falls back to sleep quickly, he or she will not remember the event. Many people with sleep apnea are unaware of their abnormal breathing in sleep, and all patients underestimate how often their sleep is interrupted. Awakening from sleep causes sleep to be unrefreshing and causes fatigue and daytime sleepiness. Anatomic causes of obstructive sleep apnea   Most patients have NATALIYA because of a small upper airway. As the bones of the face and skull develop, some people develop a small lower face, a small mouth, and a tongue that seems too large for the mouth. These features are genetically determined, which explains why NATALIYA tends to cluster in families. Obesity is another major factor. Tonsil enlargement can be an important cause, especially in children. SLEEP APNEA SYMPTOMS  The main symptoms of NATALIYA are loud snoring, fatigue, and daytime sleepiness. However, some people have no symptoms. For example, if the person does not have a bed partner, he or she may not be aware of the snoring. Fatigue and sleepiness have many causes and are often attributed to overwork and increasing age. As a result, a person may be slow to recognize that they have a problem. A bed partner or spouse often prompts the patient to seek medical care. Other symptoms may include one or more of the following:  ?Restless sleep  ? Awakening with choking, gasping, or smothering  ? Morning headaches, dry mouth, or sore throat  ? Waking frequently to urinate  ? Awakening unrested, groggy  ? Low energy, difficulty concentrating, memory impairment    Risk factors  Certain factors increase the risk of sleep apnea.   ?Increasing age [de-identified] NATALIYA occurs at all ages, but it is more common in middle and older age

## 2018-08-20 ENCOUNTER — TRANSCRIBE ORDERS (OUTPATIENT)
Dept: LAB | Facility: HOSPITAL | Age: 63
End: 2018-08-20

## 2018-08-20 ENCOUNTER — HOSPITAL ENCOUNTER (OUTPATIENT)
Dept: CT IMAGING | Facility: HOSPITAL | Age: 63
Discharge: HOME | End: 2018-08-20
Attending: INTERNAL MEDICINE | Admitting: INTERNAL MEDICINE

## 2018-08-20 DIAGNOSIS — R10.9 FLANK PAIN: Primary | ICD-10-CM

## 2018-08-20 DIAGNOSIS — R10.9 FLANK PAIN: ICD-10-CM

## 2018-08-20 PROCEDURE — 74176 CT ABD & PELVIS W/O CONTRAST: CPT

## 2018-08-21 DIAGNOSIS — N20.0 KIDNEY STONE: Primary | ICD-10-CM

## 2018-08-22 ENCOUNTER — HOSPITAL ENCOUNTER (OUTPATIENT)
Dept: GENERAL RADIOLOGY | Facility: HOSPITAL | Age: 63
Discharge: HOME | End: 2018-08-22
Attending: UROLOGY | Admitting: UROLOGY

## 2018-08-22 ENCOUNTER — OFFICE VISIT (OUTPATIENT)
Dept: UROLOGY | Facility: CLINIC | Age: 63
End: 2018-08-22

## 2018-08-22 VITALS
DIASTOLIC BLOOD PRESSURE: 82 MMHG | WEIGHT: 190 LBS | SYSTOLIC BLOOD PRESSURE: 118 MMHG | HEIGHT: 62 IN | TEMPERATURE: 97.8 F | BODY MASS INDEX: 34.96 KG/M2

## 2018-08-22 DIAGNOSIS — N20.1 LEFT URETERAL STONE: ICD-10-CM

## 2018-08-22 DIAGNOSIS — R10.9 FLANK PAIN: Primary | ICD-10-CM

## 2018-08-22 DIAGNOSIS — N20.0 KIDNEY STONE: ICD-10-CM

## 2018-08-22 PROCEDURE — 74018 RADEX ABDOMEN 1 VIEW: CPT

## 2018-08-22 PROCEDURE — 99214 OFFICE O/P EST MOD 30 MIN: CPT | Performed by: UROLOGY

## 2018-08-22 RX ORDER — TAMSULOSIN HYDROCHLORIDE 0.4 MG/1
1 CAPSULE ORAL NIGHTLY
COMMUNITY
End: 2018-08-29

## 2018-08-22 RX ORDER — RANITIDINE 150 MG/1
150 TABLET ORAL DAILY
COMMUNITY
End: 2019-12-31

## 2018-08-22 NOTE — PROGRESS NOTES
Ms. Daley is 62 y.o. female    Chief Complaint   Patient presents with   • Flank Pain       Flank Pain   This is a new problem. The current episode started in the past 7 days. The problem occurs intermittently. The problem has been waxing and waning since onset. Pain location: left flank. The quality of the pain is described as stabbing and shooting. The pain is moderate. The pain is the same all the time. Associated symptoms include dysuria and pelvic pain. Pertinent negatives include no abdominal pain or fever. Risk factors: history of stones. She has tried analgesics for the symptoms.       The following portions of the patient's history were reviewed and updated as appropriate: allergies, current medications, past family history, past medical history, past social history, past surgical history and problem list.    Review of Systems   Constitutional: Negative for chills and fever.   Gastrointestinal: Negative for abdominal pain, anal bleeding and blood in stool.   Genitourinary: Positive for difficulty urinating, dysuria, flank pain (right side ), pelvic pain, urgency and vaginal pain. Negative for decreased urine volume, dyspareunia, enuresis, frequency, genital sores, hematuria, menstrual problem, vaginal bleeding and vaginal discharge.         Current Outpatient Prescriptions:   •  allopurinol (ZYLOPRIM) 100 MG tablet, , Disp: , Rfl:   •  citalopram (CeleXA) 20 MG tablet, , Disp: , Rfl:   •  colestipol (COLESTID) 1 G tablet, , Disp: , Rfl:   •  levothyroxine (SYNTHROID, LEVOTHROID) 50 MCG tablet, , Disp: , Rfl:   •  losartan-hydrochlorothiazide (HYZAAR) 100-25 MG per tablet, , Disp: , Rfl:   •  raNITIdine (ZANTAC) 150 MG tablet, Take 150 mg by mouth Daily., Disp: , Rfl:   •  rOPINIRole (REQUIP) 0.5 MG tablet, , Disp: , Rfl:   •  tamsulosin (FLOMAX) 0.4 MG capsule 24 hr capsule, Take 1 capsule by mouth Every Night., Disp: , Rfl:   •  zolpidem (AMBIEN) 5 MG tablet, Take 0.5 mg by mouth At Night As Needed for  "Sleep (As needed)., Disp: , Rfl:   •  (No Medication Selected), Apply 1 application topically 2 (Two) Times a Day., Disp: , Rfl:   •  cholecalciferol (VITAMIN D3) 400 UNITS tablet, Take 400 Units by mouth Daily., Disp: , Rfl:   •  Conj Estrogens-Bazedoxifene 0.45-20 MG tablet, Take by mouth, Disp: , Rfl:   •  ferrous sulfate 325 (65 FE) MG tablet, Take  by mouth., Disp: , Rfl:   •  Insulin Pen Needle (NOVOFINE) 32G X 6 MM misc, 1 each Daily., Disp: 100 each, Rfl: 5  •  naproxen (NAPROSYN) 500 MG tablet, Take 1 tablet by mouth 2 (Two) Times a Day As Needed for Mild Pain (1-3)., Disp: 30 tablet, Rfl: 0  •  PCCA VANPEN BASE cream, 1 application 2 times daily Biest 3.5 Progesterone 6% Testosterone 0.125, Disp: 1 applicator, Rfl: 2    Past Medical History:   Diagnosis Date   • Asthma    • Gout    • Hypertension    • Hypothyroid    • Insomnia    • Macular degeneration    • Restless leg syndrome        Past Surgical History:   Procedure Laterality Date   • ANKLE SURGERY Left    • CERVICAL BIOPSY  W/ LOOP ELECTRODE EXCISION     • CHOLECYSTECTOMY     • COLONOSCOPY     • DILATATION AND CURETTAGE     • ENDOSCOPY     • HYSTERECTOMY  2008    Menorrhagia   • MOLE REMOVAL     • TUBAL ABDOMINAL LIGATION  1993   • WRIST SURGERY Left     Ganglion cyst and tumor removal        Social History     Social History   • Marital status:      Social History Main Topics   • Smoking status: Never Smoker   • Smokeless tobacco: Never Used   • Alcohol use Yes      Comment: Occasional   • Drug use: No     Other Topics Concern   • Not on file       Family History   Problem Relation Age of Onset   • Breast cancer Neg Hx    • Ovarian cancer Neg Hx    • Colon cancer Neg Hx        Objective    /82   Temp 97.8 °F (36.6 °C)   Ht 157.5 cm (62\")   Wt 86.2 kg (190 lb)   BMI 34.75 kg/m²     Physical Exam   Constitutional: She is oriented to person, place, and time. She appears well-developed and well-nourished. No distress.   Pulmonary/Chest: " Effort normal.   Abdominal: Soft. She exhibits no distension and no mass. There is no tenderness. There is no rebound and no guarding. No hernia.   Neurological: She is alert and oriented to person, place, and time.   Skin: Skin is warm and dry. She is not diaphoretic.   Psychiatric: She has a normal mood and affect.   Vitals reviewed.      Transcribe Orders on 10/11/2017   Component Date Value Ref Range Status   • Glucose 10/11/2017 104* 70 - 100 mg/dL Final   • BUN 10/11/2017 23* 5 - 21 mg/dL Final   • Creatinine 10/11/2017 0.70  0.50 - 1.40 mg/dL Final   • Sodium 10/11/2017 140  135 - 145 mmol/L Final   • Potassium 10/11/2017 3.3* 3.5 - 5.3 mmol/L Final   • Chloride 10/11/2017 97* 98 - 110 mmol/L Final   • CO2 10/11/2017 28.0  24.0 - 31.0 mmol/L Final   • Calcium 10/11/2017 9.2  8.4 - 10.4 mg/dL Final   • Albumin 10/11/2017 4.30  3.50 - 5.00 g/dL Final   • Phosphorus 10/11/2017 3.2  2.5 - 4.5 mg/dL Final   • Anion Gap 10/11/2017 15.0* 4.0 - 13.0 mmol/L Final   • BUN/Creatinine Ratio 10/11/2017 32.9* 7.0 - 25.0 Final   • eGFR Non African Amer 10/11/2017 85  >60 mL/min/1.73 Final   • Magnesium 10/11/2017 2.0  1.4 - 2.2 mg/dL Final   • Uric Acid 10/11/2017 6.1  2.7 - 7.5 mg/dL Final   • Color, UA 10/11/2017 Yellow  Yellow, Straw Final   • Appearance, UA 10/11/2017 Clear  Clear Final   • pH, UA 10/11/2017 5.5  5.0 - 8.0 Final   • Specific Gravity, UA 10/11/2017 <=1.005  1.005 - 1.030 Final   • Glucose, UA 10/11/2017 Negative  Negative Final   • Ketones, UA 10/11/2017 Negative  Negative Final   • Bilirubin, UA 10/11/2017 Negative  Negative Final   • Blood, UA 10/11/2017 Negative  Negative Final   • Protein, UA 10/11/2017 Negative  Negative Final   • Leuk Esterase, UA 10/11/2017 Negative  Negative Final   • Nitrite, UA 10/11/2017 Negative  Negative Final   • Urobilinogen, UA 10/11/2017 0.2 E.U./dL  0.2 - 1.0 E.U./dL Final       Results for orders placed or performed in visit on 10/11/17   Renal Function Panel   Result  Value Ref Range    Glucose 104 (H) 70 - 100 mg/dL    BUN 23 (H) 5 - 21 mg/dL    Creatinine 0.70 0.50 - 1.40 mg/dL    Sodium 140 135 - 145 mmol/L    Potassium 3.3 (L) 3.5 - 5.3 mmol/L    Chloride 97 (L) 98 - 110 mmol/L    CO2 28.0 24.0 - 31.0 mmol/L    Calcium 9.2 8.4 - 10.4 mg/dL    Albumin 4.30 3.50 - 5.00 g/dL    Phosphorus 3.2 2.5 - 4.5 mg/dL    Anion Gap 15.0 (H) 4.0 - 13.0 mmol/L    BUN/Creatinine Ratio 32.9 (H) 7.0 - 25.0    eGFR Non African Amer 85 >60 mL/min/1.73   Magnesium   Result Value Ref Range    Magnesium 2.0 1.4 - 2.2 mg/dL   Uric Acid   Result Value Ref Range    Uric Acid 6.1 2.7 - 7.5 mg/dL   Urinalysis With / Microscopic If Indicated - Urine, Clean Catch   Result Value Ref Range    Color, UA Yellow Yellow, Straw    Appearance, UA Clear Clear    pH, UA 5.5 5.0 - 8.0    Specific Gravity, UA <=1.005 1.005 - 1.030    Glucose, UA Negative Negative    Ketones, UA Negative Negative    Bilirubin, UA Negative Negative    Blood, UA Negative Negative    Protein, UA Negative Negative    Leuk Esterase, UA Negative Negative    Nitrite, UA Negative Negative    Urobilinogen, UA 0.2 E.U./dL 0.2 - 1.0 E.U./dL     Assessment and Plan    Salud was seen today for flank pain.    Diagnoses and all orders for this visit:    Flank pain    Left ureteral stone  -     US Renal Bilateral; Future    I reviewed her CT scan.  She does have a 5 mm distal ureteral stone.  This is visible today on KUB.  She is on Flomax.  Patient was given options for treatment and she is chosen to attempted to pass this stone.  I will see her back next week with a renal ultrasound.  She has continued pain or hydronephrosis, she will need distal ureteroscopy.    Ms. Daley has a ureteral stone without any absolute indication for intervention at this time.  After hearing all available options for the treatment of this stone, the patient has chosen expulsive therapy with an alpha blocker. We did discuss that this is an off label indication for the  "use of an alpha blocker.  The patient understands to contact me for fever, flank pain that is refractory to pain relief from analgesics, excessive vomiting that prevents the patient from hydrating, or hematuria severe enough to produce clots.  The importance of follow up is discussed, as the absence of pain would not necessarily mean that the stone has passed.  We discussed that this \"silent obstruction\" could lead to infection or permanent kidney damage if not treated appropriately.  I also discussed the side effects of alpha blockers, including orthostasis, central mediated dizziness, ejaculatory dysfunction (in males), and rhinitis.        CT independent review  The CT scan of the abdomen/pelvis done without contrast is available for me to review.  Treatment recommendations require an independent review.  First I scanned the liver, spleen, and bowel pattern.  The retroperitoneum including the major vessels and lymphatic packages are briefly reviewed.  This film as been reviewed by the radiologist to determine any non urologic abnormalities that are present.  The kidneys are closely inspected for size, symmetry, contour, parenchymal thickness, perinephric reaction, presence of calcifications, and intrarenal dilation of the collecting system.  The ureters are inspected for their course, caliber, and any calcifications.  The bladder is inspected for its thickness, size, and presence of any calcifications.  This scan shows:    The right kidney appears normal on this non-contrasted CT scan.  The renal parenchymal is norml in thickness.  There are no solid masses or cysts.  There is no hydronephrosis.  There are no stones.      The left kidney appears abnormal on this non contrasted CT scan.   Hydronephrosis and hydroureter to the level of 5 mm distal obstructing stone    The bladder appears normal on this non-contrasted CT scan.  The bladder appears normal in thickness.  There no masses or stones seen on this " exam.

## 2018-08-29 ENCOUNTER — OFFICE VISIT (OUTPATIENT)
Dept: UROLOGY | Facility: CLINIC | Age: 63
End: 2018-08-29

## 2018-08-29 ENCOUNTER — HOSPITAL ENCOUNTER (OUTPATIENT)
Dept: ULTRASOUND IMAGING | Facility: HOSPITAL | Age: 63
Discharge: HOME | End: 2018-08-29
Attending: UROLOGY | Admitting: UROLOGY

## 2018-08-29 VITALS — TEMPERATURE: 97.8 F | WEIGHT: 190 LBS | HEIGHT: 63 IN | BODY MASS INDEX: 33.66 KG/M2

## 2018-08-29 DIAGNOSIS — N20.1 LEFT URETERAL STONE: ICD-10-CM

## 2018-08-29 DIAGNOSIS — R10.9 FLANK PAIN: Primary | ICD-10-CM

## 2018-08-29 LAB
BILIRUB BLD-MCNC: NEGATIVE MG/DL
CLARITY, POC: CLEAR
COLOR UR: YELLOW
GLUCOSE UR STRIP-MCNC: NEGATIVE MG/DL
KETONES UR QL: NEGATIVE
LEUKOCYTE EST, POC: NEGATIVE
NITRITE UR-MCNC: NEGATIVE MG/ML
PH UR: 7.5 [PH] (ref 5–8)
PROT UR STRIP-MCNC: NEGATIVE MG/DL
RBC # UR STRIP: ABNORMAL /UL
SP GR UR: 1.03 (ref 1–1.03)
UROBILINOGEN UR QL: NORMAL

## 2018-08-29 PROCEDURE — 88300 SURGICAL PATH GROSS: CPT | Performed by: UROLOGY

## 2018-08-29 PROCEDURE — 82360 CALCULUS ASSAY QUANT: CPT | Performed by: UROLOGY

## 2018-08-29 PROCEDURE — 99213 OFFICE O/P EST LOW 20 MIN: CPT | Performed by: UROLOGY

## 2018-08-29 PROCEDURE — 81001 URINALYSIS AUTO W/SCOPE: CPT | Performed by: UROLOGY

## 2018-08-29 PROCEDURE — 76775 US EXAM ABDO BACK WALL LIM: CPT

## 2018-08-29 NOTE — PROGRESS NOTES
Ms. Daley is 62 y.o. female    Chief Complaint   Patient presents with   • Nephrolithiasis       History of Present Illness  Recent stone episode  The context of today's visit is following a recent stone episode The patient has passed the stone or fragments of it. The stone location is in the Distal ureter. The onset of this was acute. The course is improving with this management. Associated symptom(s) can be described by resolution after passing stone. The patient did get films to review today.     The following portions of the patient's history were reviewed and updated as appropriate: allergies, current medications, past family history, past medical history, past social history, past surgical history and problem list.    Review of Systems   Constitutional: Negative for chills and fever.   Gastrointestinal: Negative for abdominal pain, anal bleeding and blood in stool.   Genitourinary: Positive for dysuria. Negative for decreased urine volume, difficulty urinating, dyspareunia, enuresis, flank pain, frequency, genital sores, hematuria, menstrual problem, pelvic pain, urgency, vaginal bleeding, vaginal discharge and vaginal pain.         Current Outpatient Prescriptions:   •  (No Medication Selected), Apply 1 application topically 2 (Two) Times a Day., Disp: , Rfl:   •  allopurinol (ZYLOPRIM) 100 MG tablet, , Disp: , Rfl:   •  citalopram (CeleXA) 20 MG tablet, , Disp: , Rfl:   •  colestipol (COLESTID) 1 G tablet, , Disp: , Rfl:   •  levothyroxine (SYNTHROID, LEVOTHROID) 50 MCG tablet, , Disp: , Rfl:   •  losartan-hydrochlorothiazide (HYZAAR) 100-25 MG per tablet, , Disp: , Rfl:   •  raNITIdine (ZANTAC) 150 MG tablet, Take 150 mg by mouth Daily., Disp: , Rfl:   •  rOPINIRole (REQUIP) 0.5 MG tablet, , Disp: , Rfl:   •  zolpidem (AMBIEN) 5 MG tablet, Take 0.5 mg by mouth At Night As Needed for Sleep (As needed)., Disp: , Rfl:     Past Medical History:   Diagnosis Date   • Asthma    • Gout    • Hypertension    •  "Hypothyroid    • Insomnia    • Macular degeneration    • Restless leg syndrome        Past Surgical History:   Procedure Laterality Date   • ANKLE SURGERY Left    • CERVICAL BIOPSY  W/ LOOP ELECTRODE EXCISION     • CHOLECYSTECTOMY     • COLONOSCOPY     • DILATATION AND CURETTAGE     • ENDOSCOPY     • HYSTERECTOMY  2008    Menorrhagia   • MOLE REMOVAL     • TUBAL ABDOMINAL LIGATION  1993   • WRIST SURGERY Left     Ganglion cyst and tumor removal        Social History     Social History   • Marital status:      Social History Main Topics   • Smoking status: Never Smoker   • Smokeless tobacco: Never Used   • Alcohol use Yes      Comment: Occasional   • Drug use: No   • Sexual activity: Defer     Other Topics Concern   • Not on file       Family History   Problem Relation Age of Onset   • Breast cancer Neg Hx    • Ovarian cancer Neg Hx    • Colon cancer Neg Hx        Objective    Temp 97.8 °F (36.6 °C)   Ht 160 cm (63\")   Wt 86.2 kg (190 lb)   BMI 33.66 kg/m²     Physical Exam    Transcribe Orders on 10/11/2017   Component Date Value Ref Range Status   • Glucose 10/11/2017 104* 70 - 100 mg/dL Final   • BUN 10/11/2017 23* 5 - 21 mg/dL Final   • Creatinine 10/11/2017 0.70  0.50 - 1.40 mg/dL Final   • Sodium 10/11/2017 140  135 - 145 mmol/L Final   • Potassium 10/11/2017 3.3* 3.5 - 5.3 mmol/L Final   • Chloride 10/11/2017 97* 98 - 110 mmol/L Final   • CO2 10/11/2017 28.0  24.0 - 31.0 mmol/L Final   • Calcium 10/11/2017 9.2  8.4 - 10.4 mg/dL Final   • Albumin 10/11/2017 4.30  3.50 - 5.00 g/dL Final   • Phosphorus 10/11/2017 3.2  2.5 - 4.5 mg/dL Final   • Anion Gap 10/11/2017 15.0* 4.0 - 13.0 mmol/L Final   • BUN/Creatinine Ratio 10/11/2017 32.9* 7.0 - 25.0 Final   • eGFR Non African Amer 10/11/2017 85  >60 mL/min/1.73 Final   • Magnesium 10/11/2017 2.0  1.4 - 2.2 mg/dL Final   • Uric Acid 10/11/2017 6.1  2.7 - 7.5 mg/dL Final   • Color, UA 10/11/2017 Yellow  Yellow, Straw Final   • Appearance, UA 10/11/2017 Clear  " Clear Final   • pH, UA 10/11/2017 5.5  5.0 - 8.0 Final   • Specific Gravity, UA 10/11/2017 <=1.005  1.005 - 1.030 Final   • Glucose, UA 10/11/2017 Negative  Negative Final   • Ketones, UA 10/11/2017 Negative  Negative Final   • Bilirubin, UA 10/11/2017 Negative  Negative Final   • Blood, UA 10/11/2017 Negative  Negative Final   • Protein, UA 10/11/2017 Negative  Negative Final   • Leuk Esterase, UA 10/11/2017 Negative  Negative Final   • Nitrite, UA 10/11/2017 Negative  Negative Final   • Urobilinogen, UA 10/11/2017 0.2 E.U./dL  0.2 - 1.0 E.U./dL Final       Results for orders placed or performed in visit on 08/29/18   POC Urinalysis Dipstick, Multipro   Result Value Ref Range    Color Yellow Yellow, Straw, Dark Yellow, Jessica    Clarity, UA Clear Clear    Glucose, UA Negative Negative, 1000 mg/dL (3+) mg/dL    Bilirubin Negative Negative    Ketones, UA Negative Negative    Specific Gravity  1.030 1.005 - 1.030    Blood, UA Trace (A) Negative    pH, Urine 7.5 5.0 - 8.0    Protein, POC Negative Negative mg/dL    Urobilinogen, UA Normal Normal    Nitrite, UA Negative Negative    Leukocytes Negative Negative     Assessment and Plan    Salud was seen today for nephrolithiasis.    Diagnoses and all orders for this visit:    Flank pain  -     POC Urinalysis Dipstick, Multipro    Left ureteral stone  -     Tissue Pathology Exam    Pain has resolved.  She did bring in a stone today which appears to be the stone in question on her previous CT scan.  I sent this for pathology today.  I have independently reviewed her renal ultrasound shows no hydronephrosis bilaterally.  I do believe she is passing stone.  She has had one previous stone episode before this.  We discussed today a 24-hour metabolic workup versus basic dietary recommendations.  She would like to check with her insurance company before making any decisions.  She will call us back.    Renal ultrasound independent review    The renal ultrasound is available for me  to review.  Treatment recommendations require an independent review.  This film has been reviewed by the radiologist to determine any non urologic abnormalities that are presents.  However, I very closely inspected the kidneys for size, symmetry, contour, parenchymal thickness, perinephric reaction, presence of calcifications, and intrarenal dilation of the collecting system.       The right kidney appears normal on this ultrasound.  The renal parenchymal is normal in thickness.  There are no solid masses or cysts.  There is no hydronephrosis.  There are no stones.      The left kidney appears normal on this ultrasound.  The renal parenchymal is normal in thickness.  There are no solid masses or cysts.  There is no hydronephrosis.  There are no stones.      The bladder appears normal on thisultrsaound.  The bladder appears normal in thickness.  There no masses or stones seen on this exam.

## 2018-09-07 ENCOUNTER — RESULTS ENCOUNTER (OUTPATIENT)
Dept: UROLOGY | Facility: CLINIC | Age: 63
End: 2018-09-07

## 2018-09-07 DIAGNOSIS — N20.0 KIDNEY STONE: Primary | ICD-10-CM

## 2018-09-07 DIAGNOSIS — N20.0 KIDNEY STONE: ICD-10-CM

## 2018-09-08 LAB
CALCIUM SERPL-MCNC: 9 MG/DL (ref 8.7–10.3)
INTACT PTH: NORMAL
PTH-INTACT SERPL-MCNC: 54 PG/ML (ref 15–65)
URATE SERPL-MCNC: 6.3 MG/DL (ref 2.7–7.5)

## 2018-09-11 LAB
LAB AP CASE REPORT: NORMAL
LAB AP CLINICAL INFORMATION: NORMAL
PATH REPORT.FINAL DX SPEC: NORMAL
PATH REPORT.GROSS SPEC: NORMAL

## 2018-10-04 ENCOUNTER — OFFICE VISIT (OUTPATIENT)
Dept: UROLOGY | Facility: CLINIC | Age: 63
End: 2018-10-04

## 2018-10-04 VITALS — BODY MASS INDEX: 32.78 KG/M2 | TEMPERATURE: 98 F | WEIGHT: 185 LBS | HEIGHT: 63 IN

## 2018-10-04 DIAGNOSIS — R34 DECREASED URINE VOLUME: ICD-10-CM

## 2018-10-04 DIAGNOSIS — R82.991 HYPOCITRATURIA: ICD-10-CM

## 2018-10-04 DIAGNOSIS — Z87.442 HISTORY OF KIDNEY STONES: Primary | ICD-10-CM

## 2018-10-04 LAB
BILIRUB BLD-MCNC: NEGATIVE MG/DL
CLARITY, POC: CLEAR
COLOR UR: YELLOW
GLUCOSE UR STRIP-MCNC: NEGATIVE MG/DL
KETONES UR QL: NEGATIVE
LEUKOCYTE EST, POC: NEGATIVE
NITRITE UR-MCNC: NEGATIVE MG/ML
PH UR: 5.5 [PH] (ref 5–8)
PROT UR STRIP-MCNC: NEGATIVE MG/DL
RBC # UR STRIP: ABNORMAL /UL
SP GR UR: 1 (ref 1–1.03)
UROBILINOGEN UR QL: NORMAL

## 2018-10-04 PROCEDURE — 81001 URINALYSIS AUTO W/SCOPE: CPT | Performed by: UROLOGY

## 2018-10-04 PROCEDURE — 99213 OFFICE O/P EST LOW 20 MIN: CPT | Performed by: UROLOGY

## 2018-10-04 NOTE — PROGRESS NOTES
Ms. Daley is 62 y.o. female    Chief Complaint   Patient presents with   • Nephrolithiasis       History of Present Illness  Recurrent Urolithiasis  Patient presents for recurrent urolithiasis. Severity is best defined as having approximately 0 stone episodes over the last Several week(s). The most recent stone episode was approximately 6week(s) ago. Anatomically, the patient has experienced left ureteral calculi. Present stone burden is No stones. Current symptoms/signs possible related to urolithiasis include none. Prevention management includes hydration. Prior procedures include none.     The following portions of the patient's history were reviewed and updated as appropriate: allergies, current medications, past family history, past medical history, past social history, past surgical history and problem list.    Review of Systems   Constitutional: Negative for chills and fever.   Gastrointestinal: Negative for abdominal pain, anal bleeding and blood in stool.   Genitourinary: Positive for pelvic pain. Negative for decreased urine volume, difficulty urinating, dyspareunia, dysuria, enuresis, flank pain, frequency, genital sores, hematuria, menstrual problem, urgency, vaginal bleeding, vaginal discharge and vaginal pain.         Current Outpatient Prescriptions:   •  allopurinol (ZYLOPRIM) 100 MG tablet, , Disp: , Rfl:   •  citalopram (CeleXA) 20 MG tablet, , Disp: , Rfl:   •  colestipol (COLESTID) 1 G tablet, , Disp: , Rfl:   •  levothyroxine (SYNTHROID, LEVOTHROID) 50 MCG tablet, , Disp: , Rfl:   •  losartan-hydrochlorothiazide (HYZAAR) 100-25 MG per tablet, , Disp: , Rfl:   •  raNITIdine (ZANTAC) 150 MG tablet, Take 150 mg by mouth Daily., Disp: , Rfl:   •  rOPINIRole (REQUIP) 0.5 MG tablet, , Disp: , Rfl:   •  zolpidem (AMBIEN) 5 MG tablet, Take 0.5 mg by mouth At Night As Needed for Sleep (As needed)., Disp: , Rfl:   •  (No Medication Selected), Apply 1 application topically 2 (Two) Times a Day., Disp: ,  "Rfl:     Past Medical History:   Diagnosis Date   • Asthma    • Gout    • Hypertension    • Hypothyroid    • Insomnia    • Macular degeneration    • Restless leg syndrome        Past Surgical History:   Procedure Laterality Date   • ANKLE SURGERY Left    • CERVICAL BIOPSY  W/ LOOP ELECTRODE EXCISION     • CHOLECYSTECTOMY     • COLONOSCOPY     • DILATATION AND CURETTAGE     • ENDOSCOPY     • HYSTERECTOMY  2008    Menorrhagia   • MOLE REMOVAL     • TUBAL ABDOMINAL LIGATION  1993   • WRIST SURGERY Left     Ganglion cyst and tumor removal        Social History     Social History   • Marital status:      Social History Main Topics   • Smoking status: Never Smoker   • Smokeless tobacco: Never Used   • Alcohol use Yes      Comment: Occasional   • Drug use: No   • Sexual activity: Defer     Other Topics Concern   • Not on file       Family History   Problem Relation Age of Onset   • Breast cancer Neg Hx    • Ovarian cancer Neg Hx    • Colon cancer Neg Hx        Objective    Temp 98 °F (36.7 °C)   Ht 160 cm (63\")   Wt 83.9 kg (185 lb)   BMI 32.77 kg/m²     Physical Exam    Orders Only on 09/07/2018   Component Date Value Ref Range Status   • Calcium 09/07/2018 9.0  8.7 - 10.3 mg/dL Final   • PTH, Intact 09/07/2018 54  15 - 65 pg/mL Final   • PTH, Intact 09/07/2018 Comment   Final    Comment: Interpretation                 Intact PTH    Calcium                                  (pg/mL)      (mg/dL)  Normal                          15 - 65     8.6 - 10.2  Primary Hyperparathyroidism         >65          >10.2  Secondary Hyperparathyroidism       >65          <10.2  Non-Parathyroid Hypercalcemia       <65          >10.2  Hypoparathyroidism                  <15          < 8.6  Non-Parathyroid Hypocalcemia    15 - 65          < 8.6     • Uric Acid 09/07/2018 6.3  2.7 - 7.5 mg/dL Final       Results for orders placed or performed in visit on 10/04/18   POC Urinalysis Dipstick, Multipro   Result Value Ref Range    Color " Yellow Yellow, Straw, Dark Yellow, Jessica    Clarity, UA Clear Clear    Glucose, UA Negative Negative, 1000 mg/dL (3+) mg/dL    Bilirubin Negative Negative    Ketones, UA Negative Negative    Specific Gravity  1.005 1.005 - 1.030    Blood, UA Small (A) Negative    pH, Urine 5.5 5.0 - 8.0    Protein, POC Negative Negative mg/dL    Urobilinogen, UA Normal Normal    Nitrite, UA Negative Negative    Leukocytes Negative Negative     Assessment and Plan    Salud was seen today for nephrolithiasis.    Diagnoses and all orders for this visit:    History of kidney stones  -     POC Urinalysis Dipstick, Multipro  -     UroStone Max24 - Urine, Clean Catch; Future    Decreased urine volume  -     UroStone Max24 - Urine, Clean Catch; Future    Hypocitraturia  -     UroStone Max24 - Urine, Clean Catch; Future    Today I reviewed her 24-hour urine study.  This showed a decreased urine volume at 1.8 L as well as hypocitraturia.  I did discuss with her the importance of fluid intake and that her goal should be to make approximately 2-1/2 L of urine a day.  She expressed an understanding and we did discuss appropriate fluid management.  In addition, we discussed the low urinary citrate and its contribution to stone formation.  We discussed options including natural supplementation with lemonade versus starting a medication.  She would like to try natural supplementation first which I think is appropriate.    I will see her back in 6 months with a repeat 24-hour urine study.  If she has not made any significant improvement in her urinary citrate at that point, I would like to consider starting potassiums citrate

## 2018-10-09 ENCOUNTER — RESULTS ENCOUNTER (OUTPATIENT)
Dept: UROLOGY | Facility: CLINIC | Age: 63
End: 2018-10-09

## 2018-10-09 DIAGNOSIS — Z87.442 HISTORY OF KIDNEY STONES: ICD-10-CM

## 2018-10-09 DIAGNOSIS — R34 DECREASED URINE VOLUME: ICD-10-CM

## 2018-10-09 DIAGNOSIS — R82.991 HYPOCITRATURIA: ICD-10-CM

## 2018-11-03 DIAGNOSIS — N20.0 KIDNEY STONE: Primary | ICD-10-CM

## 2019-04-06 ENCOUNTER — TRANSCRIBE ORDERS (OUTPATIENT)
Dept: ADMINISTRATIVE | Facility: HOSPITAL | Age: 64
End: 2019-04-06

## 2019-04-06 ENCOUNTER — HOSPITAL ENCOUNTER (OUTPATIENT)
Dept: GENERAL RADIOLOGY | Facility: HOSPITAL | Age: 64
Discharge: HOME | End: 2019-04-06
Admitting: NURSE PRACTITIONER

## 2019-04-06 DIAGNOSIS — R05.9 COUGH: Primary | ICD-10-CM

## 2019-04-06 PROCEDURE — 71046 X-RAY EXAM CHEST 2 VIEWS: CPT

## 2019-04-08 ENCOUNTER — RESULTS ENCOUNTER (OUTPATIENT)
Dept: UROLOGY | Facility: CLINIC | Age: 64
End: 2019-04-08

## 2019-04-08 DIAGNOSIS — N20.0 KIDNEY STONE: ICD-10-CM

## 2019-04-09 ENCOUNTER — TELEPHONE (OUTPATIENT)
Dept: UROLOGY | Facility: CLINIC | Age: 64
End: 2019-04-09

## 2019-04-09 NOTE — TELEPHONE ENCOUNTER
Called and left patient a message for them to reschedule their appointment on 4/10/19 because they will need a 24 hr urine prior to this appointment so we will have to move the appointment at least 2 weeks out so we will have the results back in time for the appointment.   Left a message on the patient's mobile for them to call back and reschedule their appointment.

## 2019-04-10 DIAGNOSIS — N20.0 KIDNEY STONE: Primary | ICD-10-CM

## 2019-04-17 ENCOUNTER — HOSPITAL ENCOUNTER (OUTPATIENT)
Dept: GENERAL RADIOLOGY | Facility: HOSPITAL | Age: 64
Discharge: HOME | End: 2019-04-17
Admitting: INTERNAL MEDICINE

## 2019-04-17 ENCOUNTER — TRANSCRIBE ORDERS (OUTPATIENT)
Dept: GENERAL RADIOLOGY | Facility: HOSPITAL | Age: 64
End: 2019-04-17

## 2019-04-17 DIAGNOSIS — Z78.0 POSTMENOPAUSAL: ICD-10-CM

## 2019-04-17 DIAGNOSIS — Z78.0 POSTMENOPAUSAL: Primary | ICD-10-CM

## 2019-04-17 PROCEDURE — 77080 DXA BONE DENSITY AXIAL: CPT

## 2019-04-29 ENCOUNTER — RESULTS ENCOUNTER (OUTPATIENT)
Dept: UROLOGY | Facility: CLINIC | Age: 64
End: 2019-04-29

## 2019-04-29 DIAGNOSIS — N20.0 KIDNEY STONE: ICD-10-CM

## 2019-05-01 DIAGNOSIS — N20.0 KIDNEY STONE: Primary | ICD-10-CM

## 2019-05-10 ENCOUNTER — RESULTS ENCOUNTER (OUTPATIENT)
Dept: UROLOGY | Facility: CLINIC | Age: 64
End: 2019-05-10

## 2019-05-10 DIAGNOSIS — N20.0 KIDNEY STONE: ICD-10-CM

## 2019-05-13 DIAGNOSIS — N20.0 KIDNEY STONE: Primary | ICD-10-CM

## 2019-05-16 ENCOUNTER — TELEPHONE (OUTPATIENT)
Dept: UROLOGY | Facility: CLINIC | Age: 64
End: 2019-05-16

## 2019-05-16 NOTE — TELEPHONE ENCOUNTER
[facility] called stating that due to a mishandling they will have to recollect the 24 hr urine from pt for testing. They are going to call and verify that with pt.

## 2019-05-17 ENCOUNTER — RESULTS ENCOUNTER (OUTPATIENT)
Dept: UROLOGY | Facility: CLINIC | Age: 64
End: 2019-05-17

## 2019-05-17 DIAGNOSIS — N20.0 KIDNEY STONE: ICD-10-CM

## 2019-05-20 ENCOUNTER — TELEPHONE (OUTPATIENT)
Dept: UROLOGY | Facility: CLINIC | Age: 64
End: 2019-05-20

## 2019-05-20 DIAGNOSIS — N20.0 KIDNEY STONE: Primary | ICD-10-CM

## 2019-05-20 NOTE — TELEPHONE ENCOUNTER
Pt will need to be reschd if she has not turned her 24 hr urine in yet. Her appt in 5/24 with Katelynn.

## 2019-05-20 NOTE — TELEPHONE ENCOUNTER
Called patient and rescheduled her appointment to 6/14/19. Patient is going to try to come in this week to get their 24 hr urine kit and get it done this week again. Patient voiced understanding.

## 2019-05-27 ENCOUNTER — RESULTS ENCOUNTER (OUTPATIENT)
Dept: UROLOGY | Facility: CLINIC | Age: 64
End: 2019-05-27

## 2019-05-27 DIAGNOSIS — N20.0 KIDNEY STONE: ICD-10-CM

## 2019-06-04 LAB
AMMONIA 24H UR-MCNC: 5720 UG/DL
AMMONIA 24H UR-SRATE: 11 MEQ/24 HR
CA H2 PHOS DIHYD CRY URNS QL MICRO: 0.26 RATIO (ref 0–3)
CALCIUM 24H UR-MCNC: 1.9 MG/DL
CALCIUM 24H UR-MRATE: 59.9 MG/24 HR (ref 100–300)
CHLORIDE 24H UR-SCNC: 63 MMOL/L
CHLORIDE 24H UR-SRATE: 198 MMOL/24 HR (ref 110–250)
CITRATE 24H UR-MCNC: 127 MG/L
CITRATE 24H UR-MRATE: 400 MG/24 HR (ref 320–1240)
COM CRY STONE QL IR: 0.47 RATIO (ref 0–6)
CREAT 24H UR-MCNC: 23.7 MG/DL
CREAT 24H UR-MRATE: 746.6 MG/24 HR (ref 800–1800)
CYSTINE 24H UR-MCNC: 2.47 MG/L
CYSTINE 24H UR-MRATE: 7.78 MG/24 HR (ref 10–100)
LABORATORY COMMENT REPORT: ABNORMAL
MAGNESIUM 24H UR-MRATE: 47 MG/24 HR (ref 12–293)
MAGNESIUM UR-MCNC: 1.5 MG/DL
NA URATE CRY STONE QL IR: 0.94 RATIO (ref 0–4)
OSMOLALITY UR: 254 MOSMOL/KG (ref 300–900)
OXALATE 24H UR-MRATE: 16 MG/24 HR (ref 4–31)
OXALATE UR-MCNC: 5 MG/L
PH 24H UR: 7.5 [PH]
PHOSPHATE 24H UR-MCNC: 10.4 MG/DL
PHOSPHATE 24H UR-MRATE: 327.6 MG/24 HR (ref 400–1300)
POTASSIUM 24H UR-SRATE: 35 MMOL/24 HR (ref 25–125)
POTASSIUM UR-SCNC: 11.1 MMOL/L
SODIUM 24H UR-SCNC: 87 MMOL/L
SODIUM 24H UR-SRATE: 274 MMOL/24 HR (ref 39–258)
SPECIMEN VOL 24H UR: 3150 ML/24 HR (ref 600–1600)
SPECIMEN VOL 24H UR: 3150 ML/24 HR (ref 600–1600)
SULFATE 24H UR-SCNC: 3 MEQ/L
SULFATE 24H UR-SRATE: 9 MEQ/24 HR (ref 0–30)
TRI-PHOS CRY STONE MICRO: 0.03 RATIO (ref 0–1)
URATE 24H UR-MCNC: 8.6 MG/DL
URATE 24H UR-MRATE: 271 MG/24 HR (ref 250–750)
URATE DIHYD CRY STONE QL IR: 0.01 RATIO (ref 0–1.2)

## 2019-06-14 ENCOUNTER — OFFICE VISIT (OUTPATIENT)
Dept: UROLOGY | Facility: CLINIC | Age: 64
End: 2019-06-14

## 2019-06-14 VITALS
RESPIRATION RATE: 16 BRPM | BODY MASS INDEX: 32.78 KG/M2 | HEIGHT: 63 IN | HEART RATE: 105 BPM | OXYGEN SATURATION: 98 % | WEIGHT: 185 LBS

## 2019-06-14 DIAGNOSIS — N20.0 RECURRENT KIDNEY STONES: Primary | ICD-10-CM

## 2019-06-14 DIAGNOSIS — E87.0 HYPERNATRIURIA: ICD-10-CM

## 2019-06-14 LAB
BILIRUB BLD-MCNC: NEGATIVE MG/DL
CLARITY, POC: CLEAR
COLOR UR: YELLOW
GLUCOSE UR STRIP-MCNC: NEGATIVE MG/DL
KETONES UR QL: NEGATIVE
LEUKOCYTE EST, POC: NEGATIVE
NITRITE UR-MCNC: NEGATIVE MG/ML
PH UR: 7 [PH] (ref 5–8)
PROT UR STRIP-MCNC: NEGATIVE MG/DL
RBC # UR STRIP: NEGATIVE /UL
SP GR UR: 1.01 (ref 1–1.03)
UROBILINOGEN UR QL: NORMAL

## 2019-06-14 PROCEDURE — 81001 URINALYSIS AUTO W/SCOPE: CPT | Performed by: NURSE PRACTITIONER

## 2019-06-14 PROCEDURE — 99213 OFFICE O/P EST LOW 20 MIN: CPT | Performed by: NURSE PRACTITIONER

## 2019-06-14 RX ORDER — POTASSIUM CITRATE 10 MEQ/1
10 TABLET, EXTENDED RELEASE ORAL DAILY
Refills: 7 | COMMUNITY
Start: 2019-05-15 | End: 2020-09-24 | Stop reason: ALTCHOICE

## 2019-06-14 NOTE — PATIENT INSTRUCTIONS

## 2019-06-14 NOTE — PROGRESS NOTES
Ms. Daley is 63 y.o. female    Chief Complaint   Patient presents with   • Nephrolithiasis       History of Present Illness  Patient presents for follow-up after initiation of potassium citrate for recurrent stones.  She has had 2 stone episodes with her most recent one being at least one year ago.  Stone analysis reveals calcium oxalate stones.  She was able to pass both of the stones on her own.  She is currently asymptomatic she denies fevers, chills, nausea, vomiting, flank pain, hematuria.    The following portions of the patient's history were reviewed and updated as appropriate: allergies, current medications, past family history, past medical history, past social history, past surgical history and problem list.    Review of Systems   Constitutional: Negative for chills and fever.   Gastrointestinal: Negative for abdominal distention, abdominal pain, nausea and vomiting.   Genitourinary: Negative for difficulty urinating, dysuria, flank pain, frequency, hematuria and urgency.         Current Outpatient Medications:   •  (No Medication Selected), Apply 1 application topically 2 (Two) Times a Day., Disp: , Rfl:   •  allopurinol (ZYLOPRIM) 100 MG tablet, , Disp: , Rfl:   •  citalopram (CeleXA) 20 MG tablet, , Disp: , Rfl:   •  colestipol (COLESTID) 1 G tablet, , Disp: , Rfl:   •  levothyroxine (SYNTHROID, LEVOTHROID) 50 MCG tablet, , Disp: , Rfl:   •  losartan-hydrochlorothiazide (HYZAAR) 100-25 MG per tablet, , Disp: , Rfl:   •  potassium citrate (UROCIT-K) 10 MEQ (1080 MG) CR tablet, , Disp: , Rfl: 7  •  raNITIdine (ZANTAC) 150 MG tablet, Take 150 mg by mouth Daily., Disp: , Rfl:   •  rOPINIRole (REQUIP) 0.5 MG tablet, , Disp: , Rfl:     Past Medical History:   Diagnosis Date   • Asthma    • Gout    • Hypertension    • Hypothyroid    • Insomnia    • Macular degeneration    • Restless leg syndrome        Past Surgical History:   Procedure Laterality Date   • ANKLE SURGERY Left    • CERVICAL BIOPSY  W/ LOOP  "ELECTRODE EXCISION     • CHOLECYSTECTOMY     • COLONOSCOPY     • DILATATION AND CURETTAGE     • ENDOSCOPY     • HYSTERECTOMY  2008    Menorrhagia   • MOLE REMOVAL     • TUBAL ABDOMINAL LIGATION  1993   • WRIST SURGERY Left     Ganglion cyst and tumor removal        Social History     Socioeconomic History   • Marital status:      Spouse name: Not on file   • Number of children: Not on file   • Years of education: Not on file   • Highest education level: Not on file   Tobacco Use   • Smoking status: Never Smoker   • Smokeless tobacco: Never Used   Substance and Sexual Activity   • Alcohol use: Yes     Comment: Occasional   • Drug use: No   • Sexual activity: Defer       Family History   Problem Relation Age of Onset   • Breast cancer Neg Hx    • Ovarian cancer Neg Hx    • Colon cancer Neg Hx        Objective    Pulse 105   Resp 16   Ht 160 cm (63\")   Wt 83.9 kg (185 lb)   SpO2 98%   BMI 32.77 kg/m²     Physical Exam   Constitutional: She is oriented to person, place, and time. She appears well-developed.   HENT:   Head: Normocephalic.   Pulmonary/Chest: Effort normal. No respiratory distress.   Abdominal: Soft. She exhibits no distension.   Musculoskeletal: She exhibits no edema.   Neurological: She is alert and oriented to person, place, and time.   Skin: Skin is warm and dry.   Psychiatric: She has a normal mood and affect. Her behavior is normal.   Vitals reviewed.    Patient's Body mass index is 32.77 kg/m². BMI is above normal parameters. Recommendations include: educational material.      Orders Only on 05/27/2019   Component Date Value Ref Range Status   • Urine Volume 05/27/2019 3,150* 600 - 1,600 mL/24 hr Final   • Urine Volume (Preservative) 05/27/2019 3,150* 600 - 1,600 mL/24 hr Final   • Calcium, Urine 05/27/2019 1.9  Not Estab. mg/dL Final   • Calcium Urine 24 Hr 05/27/2019 59.9* 100.0 - 300.0 mg/24 hr Final   • Sodium, urine 05/27/2019 87  Not Estab. mmol/L Final   • Sodium, 24H Ur " 05/27/2019 274* 39 - 258 mmol/24 hr Final   • Phosphorus, urine 05/27/2019 10.4  Not Estab. mg/dL Final   • Phosphorus, 24H Ur 05/27/2019 327.6* 400.0 - 1,300.0 mg/24 hr Final   • Uric Acid, Urine 05/27/2019 8.6  Not Estab. mg/dL Final   • Uric Acid, 24H Ur 05/27/2019 271  250 - 750 mg/24 hr Final   • Potassium, urine 05/27/2019 11.1  Not Estab. mmol/L Final   • Potassium, 24H Ur 05/27/2019 35.0  25.0 - 125.0 mmol/24 hr Final   • Chloride, Urine 05/27/2019 63  Not Estab. mmol/L Final   • Timed Urine Chloride 05/27/2019 198  110 - 250 mmol/24 hr Final   • Citric Acid, Urine 05/27/2019 127  Not Estab. mg/L Final   • Citrate 24H UR 05/27/2019 400  320 - 1,240 mg/24 hr Final   • Oxalate, Urine 05/27/2019 5  Not Estab. mg/L Final   • Oxalate, 24H Ur 05/27/2019 16  4 - 31 mg/24 hr Final   • Magnesium, urine 05/27/2019 1.5  Not Estab. mg/dL Final   • Magnesium, 24H Ur 05/27/2019 47  12 - 293 mg/24 hr Final   • Sulfate (mEq/L), Urine 05/27/2019 3  Not Estab. mEq/L Final   • Sulfate, 24H Ur 05/27/2019 9  0 - 30 mEq/24 hr Final   • Cystine, Urine 05/27/2019 2.47  Not Estab. mg/L Final   • Cystine, 24H Ur 05/27/2019 7.78* 10.00 - 100.00 mg/24 hr Final   • Osmolality, URINE 05/27/2019 254* 300 - 900 mOsmol/kg Final   • Creatinine, Urine 05/27/2019 23.7  Not Estab. mg/dL Final   • Creatinine, 24H  05/27/2019 746.6* 800.0 - 1,800.0 mg/24 hr Final   • pH, 24 Hr Urine 05/27/2019 7.5   Final   • Ammonia, Urine 05/27/2019 5,720  Not Estab. ug/dL Final   • Ammonia 24 Hr Urine 05/27/2019 11  Not Estab. mEq/24 hr Final   • Calcium Oxalate 05/27/2019 0.47  0.00 - 6.00 ratio Final   • Brushite 05/27/2019 0.26  0.00 - 3.00 ratio Final   • Monosodium Urate 05/27/2019 0.94  0.00 - 4.00 ratio Final   • Uric Acid Urine 05/27/2019 0.01  0.00 - 1.20 ratio Final   • Struvite 05/27/2019 0.03  0.00 - 1.00 ratio Final   • Please note 05/27/2019 Comment   Final    Comment: Graphic analysis of results will follow via computer, mail, or    delivery.         Results for orders placed or performed in visit on 06/14/19   POC Urinalysis Dipstick, Multipro   Result Value Ref Range    Color Yellow Yellow, Straw, Dark Yellow, Jessica    Clarity, UA Clear Clear    Glucose, UA Negative Negative, 1000 mg/dL (3+) mg/dL    Bilirubin Negative Negative    Ketones, UA Negative Negative    Specific Gravity  1.015 1.005 - 1.030    Blood, UA Negative Negative    pH, Urine 7.0 5.0 - 8.0    Protein, POC Negative Negative mg/dL    Urobilinogen, UA Normal Normal    Nitrite, UA Negative Negative    Leukocytes Negative Negative        Assessment/Plan   Assessment and Plan    Salud was seen today for nephrolithiasis.    Diagnoses and all orders for this visit:    Recurrent kidney stones  -     POC Urinalysis Dipstick, Multipro  -     XR Abdomen KUB; Future  -     UroStone Max24 - Urine, Clean Catch; Future    Hypernatriuria    Patient presents for follow-up after completing a second 24-hour urine study after initiation of potassium citrate.  Most current urine study reveals high urinary pH and high urinary sodium.  Urine volume increased significantly since her last study.  However, patient does admit to drinking a great deal more than she typically does in a normal day while she was completing the study.  Her urinary citrate levels have increased significantly since her last urine study.    For her high urinary sodium, I have recommended she decrease her dietary sodium.  Patient admits she does have a diet high in processed foods and additional table salt.    I would like to follow-up with her in 1 year with a KUB and a repeat 24-hour urine.

## 2019-11-14 LAB
ALBUMIN SERPL-MCNC: 4.3 G/DL (ref 3.5–5.2)
ALP BLD-CCNC: 101 U/L (ref 35–104)
ALT SERPL-CCNC: 19 U/L (ref 5–33)
ANION GAP SERPL CALCULATED.3IONS-SCNC: 19 MMOL/L (ref 7–19)
AST SERPL-CCNC: 22 U/L (ref 5–32)
BACTERIA: NEGATIVE /HPF
BASOPHILS ABSOLUTE: 0 K/UL (ref 0–0.2)
BASOPHILS RELATIVE PERCENT: 0.5 % (ref 0–1)
BILIRUB SERPL-MCNC: 0.3 MG/DL (ref 0.2–1.2)
BILIRUBIN URINE: NEGATIVE
BLOOD, URINE: ABNORMAL
BUN BLDV-MCNC: 20 MG/DL (ref 8–23)
CALCIUM SERPL-MCNC: 9.8 MG/DL (ref 8.8–10.2)
CHLORIDE BLD-SCNC: 99 MMOL/L (ref 98–111)
CLARITY: CLEAR
CO2: 25 MMOL/L (ref 22–29)
COLOR: YELLOW
CREAT SERPL-MCNC: 0.8 MG/DL (ref 0.5–0.9)
CREATININE URINE: 158.8 MG/DL (ref 4.2–622)
EOSINOPHILS ABSOLUTE: 0.2 K/UL (ref 0–0.6)
EOSINOPHILS RELATIVE PERCENT: 3.1 % (ref 0–5)
EPITHELIAL CELLS, UA: 3 /HPF (ref 0–5)
GFR NON-AFRICAN AMERICAN: >60
GLUCOSE BLD-MCNC: 152 MG/DL (ref 74–109)
GLUCOSE URINE: NEGATIVE MG/DL
HCT VFR BLD CALC: 45.3 % (ref 37–47)
HEMOGLOBIN: 14.4 G/DL (ref 12–16)
HYALINE CASTS: 6 /HPF (ref 0–8)
IMMATURE GRANULOCYTES #: 0 K/UL
KETONES, URINE: NEGATIVE MG/DL
LEUKOCYTE ESTERASE, URINE: NEGATIVE
LYMPHOCYTES ABSOLUTE: 1.9 K/UL (ref 1.1–4.5)
LYMPHOCYTES RELATIVE PERCENT: 25.8 % (ref 20–40)
MAGNESIUM: 1.9 MG/DL (ref 1.6–2.4)
MCH RBC QN AUTO: 27.3 PG (ref 27–31)
MCHC RBC AUTO-ENTMCNC: 31.8 G/DL (ref 33–37)
MCV RBC AUTO: 85.8 FL (ref 81–99)
MONOCYTES ABSOLUTE: 0.4 K/UL (ref 0–0.9)
MONOCYTES RELATIVE PERCENT: 5.5 % (ref 0–10)
NEUTROPHILS ABSOLUTE: 4.9 K/UL (ref 1.5–7.5)
NEUTROPHILS RELATIVE PERCENT: 64.8 % (ref 50–65)
NITRITE, URINE: NEGATIVE
PARATHYROID HORMONE INTACT: 51.6 PG/ML (ref 15–65)
PDW BLD-RTO: 13.4 % (ref 11.5–14.5)
PH UA: 5.5 (ref 5–8)
PHOSPHORUS: 3 MG/DL (ref 2.5–4.5)
PLATELET # BLD: 321 K/UL (ref 130–400)
PMV BLD AUTO: 11 FL (ref 9.4–12.3)
POTASSIUM SERPL-SCNC: 3.7 MMOL/L (ref 3.5–5)
PROTEIN PROTEIN: 11 MG/DL (ref 15–45)
PROTEIN UA: NEGATIVE MG/DL
RBC # BLD: 5.28 M/UL (ref 4.2–5.4)
RBC UA: 2 /HPF (ref 0–4)
SODIUM BLD-SCNC: 143 MMOL/L (ref 136–145)
SPECIFIC GRAVITY UA: 1.02 (ref 1–1.03)
TOTAL PROTEIN: 7.7 G/DL (ref 6.6–8.7)
URIC ACID, SERUM: 7 MG/DL (ref 2.4–5.7)
UROBILINOGEN, URINE: 0.2 E.U./DL
VITAMIN D 25-HYDROXY: 25.5 NG/ML
WBC # BLD: 7.5 K/UL (ref 4.8–10.8)
WBC UA: 1 /HPF (ref 0–5)

## 2019-11-15 ENCOUNTER — TRANSCRIBE ORDERS (OUTPATIENT)
Dept: GENERAL RADIOLOGY | Facility: HOSPITAL | Age: 64
End: 2019-11-15

## 2019-11-15 ENCOUNTER — HOSPITAL ENCOUNTER (OUTPATIENT)
Dept: GENERAL RADIOLOGY | Facility: HOSPITAL | Age: 64
Discharge: HOME | End: 2019-11-15
Admitting: NURSE PRACTITIONER

## 2019-11-15 DIAGNOSIS — R05.9 COUGH: ICD-10-CM

## 2019-11-15 DIAGNOSIS — R05.9 COUGH: Primary | ICD-10-CM

## 2019-11-15 PROCEDURE — 71046 X-RAY EXAM CHEST 2 VIEWS: CPT

## 2019-12-31 PROCEDURE — 84439 ASSAY OF FREE THYROXINE: CPT | Performed by: NURSE PRACTITIONER

## 2019-12-31 PROCEDURE — 84443 ASSAY THYROID STIM HORMONE: CPT | Performed by: NURSE PRACTITIONER

## 2020-02-05 NOTE — PROGRESS NOTES
VIN Ross  [facility]   Res[facility]  [address] [place] [address]  Phone: [phone]  Fax: [phone]     Salud Daley is a 64 y.o. female.   CC:   Chief Complaint   Patient presents with   • Cough        HPI: Ms. Daley is a pleasant 64-year-old female referred to pulmonary by a Gillian Banks NP with urgent care. Patient states she started getting sick in Nov and then relapse in Dec. PCP gave round of antibiotics, steroids as well as shots for both. In Dec got the same treatment. The next day she was diaphoretic, pale, cold so coworkers called  and was taken to  where she was advised not to take the steroids and keep on the inhalers. About 2-3 weeks later she passed a kidney stone. She states she is better now. She started coughing, nose running, eyes watering, head hurting on the breathing test. She took the advair for 30 days. She has used inhalers in the past with Dr. Conroy last seeing him about 10 years ago. She takes generic zyrtec. She has a sleep study at [facility] years ago and had CPAP use but took herself off of it.     The following portions of the patient's history were reviewed and updated as appropriate: allergies, current medications, past family history, past medical history, past social history, past surgical history and problem list.    Past Medical History:   Diagnosis Date   • Asthma    • Gout    • Hypertension    • Hypothyroid    • Insomnia    • Macular degeneration    • Restless leg syndrome        Family History   Problem Relation Age of Onset   • Heart failure Mother    • Diabetes Mother    • COPD Father    • Diabetes Father    • Clotting disorder Sister    • Breast cancer Neg Hx    • Ovarian cancer Neg Hx    • Colon cancer Neg Hx        Social History     Socioeconomic History   • Marital status:      Spouse name: Not on file   • Number of children: Not on file   • Years of education: Not on file   • Highest  "education level: Not on file   Tobacco Use   • Smoking status: Never Smoker   • Smokeless tobacco: Never Used   Substance and Sexual Activity   • Alcohol use: Yes     Comment: Occasional   • Drug use: No   • Sexual activity: Defer       Review of Systems   Constitutional: Negative for chills, diaphoresis, fatigue and fever.   HENT: Negative for congestion, rhinorrhea and trouble swallowing.    Eyes: Negative for blurred vision, double vision and visual disturbance.   Respiratory: Positive for cough and shortness of breath (improved). Negative for wheezing.    Cardiovascular: Negative for chest pain, palpitations and leg swelling.   Gastrointestinal: Negative for abdominal distention, abdominal pain and nausea.   Endocrine: Negative for cold intolerance, heat intolerance and polydipsia.   Musculoskeletal: Negative for back pain, gait problem and myalgias.   Skin: Negative for color change, pallor and rash.   Neurological: Negative for dizziness, syncope and confusion.   Hematological: Negative for adenopathy. Does not bruise/bleed easily.   Psychiatric/Behavioral: Negative for agitation, behavioral problems and sleep disturbance.       /80   Pulse 77   Ht 158.8 cm (62.5\")   Wt 102 kg (224 lb 9.6 oz)   SpO2 99% Comment: Ra  Breastfeeding No   BMI 40.43 kg/m²     Physical Exam   Constitutional: She is oriented to person, place, and time. She appears well-developed and well-nourished. She is morbidly obese.  Eyes: Pupils are equal, round, and reactive to light. Conjunctivae are normal.   Neck: Normal range of motion. Neck supple.   Cardiovascular: Normal rate and regular rhythm. Exam reveals no gallop and no friction rub.   No murmur heard.  Pulmonary/Chest: Effort normal and breath sounds normal.   Abdominal: Soft. Bowel sounds are normal.   Musculoskeletal: Normal range of motion. She exhibits no deformity.   Neurological: She is alert and oriented to person, place, and time.   Skin: Skin is warm and dry. "   Psychiatric: She has a normal mood and affect. Her behavior is normal. Judgment and thought content normal.       Pulmonary Functions Testing Results:    PFT Values        Some values may be hidden. Unless noted otherwise, only the newest values recorded on each date are displayed.         Old Values PFT Results 2/12/20   No data to display.      Pre Drug PFT Results 2/12/20   FVC 74   FEV1 68   FEF 25-75% 49   FEV1/FVC 74.04      Post Drug PFT Results 2/12/20   No data to display.      Other Tests PFT Results 2/12/20   TLC 92      DLCO 114   D/VAsb 143           My interpretation: Moderate restriction with small airway disease, normal lung volumes, reduced diffusion capacity when corrected is supranormal.  Parison is made to her 2012 PFTs which shows a reduction in her FEV1/FVC, reduction in her FEV1, reduction in her FVC, reduction in her FEF 25-75%.  Of note her weight has gone from 180 pounds in 2012 to 224.6 pounds currently.    CXR: no new imaging         Problem List Items Addressed This Visit     None      Visit Diagnoses     Cough    -  Primary    Relevant Orders    Pulmonary Function Test (Completed)    Mild intermittent asthma without complication            Patient's Body mass index is 40.43 kg/m². BMI is above normal parameters. Recommendations include: referral to primary care.      Assessment/Plan        She will continue her Advair daily at this time.  I have recommended that she take her over-the-counter antihistamine as well.  Follow-up in 3 months.  I had advised her that I would research through the old software program and find Dr. Conroy's prior note and PFTs Review and comparison of her current PFTs with her prior PFTs from 2012 show some decrease however with the increase of her weight and the supranormal diffusion capacity when corrected would suggest that this is related to her weight gain.  I have left a message for her to return my call in regards to this.     Alyx March  VIN Sanford  2/12/2020  2:11 PM    Return in about 3 months (around 5/12/2020).    This dictation was generated by voice recognition computer software. Although all attempts are made to edit dictation for accuracy, there may be errors in the transcription that are not intended.

## 2020-02-12 ENCOUNTER — OFFICE VISIT (OUTPATIENT)
Dept: PULMONOLOGY | Facility: CLINIC | Age: 65
End: 2020-02-12

## 2020-02-12 VITALS
HEIGHT: 63 IN | WEIGHT: 224.6 LBS | BODY MASS INDEX: 39.8 KG/M2 | HEART RATE: 77 BPM | DIASTOLIC BLOOD PRESSURE: 80 MMHG | SYSTOLIC BLOOD PRESSURE: 150 MMHG | OXYGEN SATURATION: 99 %

## 2020-02-12 DIAGNOSIS — R05.9 COUGH: Primary | ICD-10-CM

## 2020-02-12 DIAGNOSIS — J45.20 MILD INTERMITTENT ASTHMA WITHOUT COMPLICATION: ICD-10-CM

## 2020-02-12 PROCEDURE — 99214 OFFICE O/P EST MOD 30 MIN: CPT | Performed by: NURSE PRACTITIONER

## 2020-02-12 PROCEDURE — 94727 GAS DIL/WSHOT DETER LNG VOL: CPT | Performed by: NURSE PRACTITIONER

## 2020-02-12 PROCEDURE — 94010 BREATHING CAPACITY TEST: CPT | Performed by: NURSE PRACTITIONER

## 2020-02-12 PROCEDURE — 94729 DIFFUSING CAPACITY: CPT | Performed by: NURSE PRACTITIONER

## 2020-02-12 RX ORDER — ALLOPURINOL 100 MG/1
200 TABLET ORAL DAILY
COMMUNITY
Start: 2020-02-05 | End: 2020-12-15 | Stop reason: DRUGHIGH

## 2020-02-12 RX ORDER — CITALOPRAM 40 MG/1
40 TABLET ORAL DAILY
COMMUNITY
End: 2023-02-02 | Stop reason: DRUGHIGH

## 2020-02-12 NOTE — PROCEDURES
Pulmonary Function Test  Performed by: Alyx Sanford APRN  Authorized by: Alyx Sanford APRN      Pre Drug    FVC: 74%   FEV1: 68%   FEF 25-75%: 49%   FEV1/FVC: 74.04%   T%   RV: 112%   DLCO: 114%   D/VAsb: 143%

## 2020-05-07 NOTE — PROGRESS NOTES
Salud Daley is a 64 y.o. female.   CC:   Chief Complaint   Patient presents with   • Cough        Telephone office note.  Due to the pandemic of Covid-19 and social distancing restrictions, the patient has agreed to perform their visit via telephone conference.      HPI: Ms. Daley is a pleasant 64-year-old female with known mild intermittent asthma and cough formally seen in the office by Dr. Conroy.  She was referred back to our office in February following recurrent bouts of getting sick in November and December.  When I saw her in February she was doing much better.  Her PFTs at that time showed moderate restriction with small airway disease, normal lung volumes, reduced DLCO when corrected for alveolar volume was supranormal.  Review of her past records from 2012 show a decrease in her pulmonary function study however she has had an increase in her weight since that time of approximately 40 pounds.  She was continued on her Advair and asked to take an over-the-counter antihistamine. She has been taking generic zyrtec. This has helped her symptoms. She states she continues to cough mostly at night. She takes tessalon pearls and this helps. She states he  tells her otherwise she is coughing and snoring. She has had hx of MANUEL with CPAP use in past but took herself off of it.     The following portions of the patient's history were reviewed and updated as appropriate: allergies, current medications, past family history, past medical history, past social history, past surgical history and problem list.    Past Medical History:   Diagnosis Date   • Asthma    • Gout    • Hypertension    • Hypothyroid    • Insomnia    • Macular degeneration    • Restless leg syndrome        Family History   Problem Relation Age of Onset   • Heart failure Mother    • Diabetes Mother    • COPD Father    • Diabetes Father    • Clotting disorder Sister    • Breast cancer Neg Hx    • Ovarian cancer Neg Hx    • Colon cancer Neg Hx         Social History     Socioeconomic History   • Marital status:      Spouse name: Not on file   • Number of children: Not on file   • Years of education: Not on file   • Highest education level: Not on file   Tobacco Use   • Smoking status: Never Smoker   • Smokeless tobacco: Never Used   Substance and Sexual Activity   • Alcohol use: Yes     Comment: Occasional   • Drug use: No   • Sexual activity: Defer       Review of Systems   Constitutional: Positive for fatigue. Negative for chills, diaphoresis and fever.   HENT: Negative for congestion, rhinorrhea and trouble swallowing.    Eyes: Negative for blurred vision, double vision and visual disturbance.   Respiratory: Positive for cough and shortness of breath. Negative for wheezing.    Cardiovascular: Negative for chest pain, palpitations and leg swelling.   Gastrointestinal: Negative for abdominal distention, abdominal pain and nausea.   Endocrine: Negative for cold intolerance, heat intolerance and polydipsia.   Musculoskeletal: Negative for back pain, gait problem and myalgias.   Skin: Negative for color change, pallor and rash.   Neurological: Negative for dizziness, syncope and confusion.   Hematological: Negative for adenopathy. Does not bruise/bleed easily.   Psychiatric/Behavioral: Negative for agitation, behavioral problems and sleep disturbance.       There were no vitals taken for this visit.    Physical Exam  No PE related to Telephone Encounter     Pulmonary Functions Testing Results:    PFT Values        Some values may be hidden. Unless noted otherwise, only the newest values recorded on each date are displayed.         Old Values PFT Results 2/12/20   No data to display.      Pre Drug PFT Results 2/12/20   FVC 74   FEV1 68   FEF 25-75% 49   FEV1/FVC 74.04      Post Drug PFT Results 2/12/20   No data to display.      Other Tests PFT Results 2/12/20   TLC 92      DLCO 114   D/VAsb 143           Results for orders placed in visit on  20   Pulmonary Function Test    Narrative Tiffany Vu, RRT     2020 10:15 AM  Pulmonary Function Test  Performed by: Alyx Sanford APRN  Authorized by: Alyx Sanford APRN      Pre Drug    FVC: 74%   FEV1: 68%   FEF 25-75%: 49%   FEV1/FVC: 74.04%   T%   RV: 112%   DLCO: 114%   D/VAsb: 143%       My interpretation: no new    CXR: no new         Problem List Items Addressed This Visit     None      Visit Diagnoses     Mild persistent asthma without complication    -  Primary    Relevant Medications    albuterol sulfate  (90 Base) MCG/ACT inhaler    Cough        Morbid obesity with BMI of 40.0-44.9, adult (CMS/[facility])        Snoring              Assessment/Plan        She will continue her Advair and over-the-counter antihistamine. She would like to be tested again for MANUEL. It has been a long time. She continues to snore. She would like a home sleep study however is uncertain which DME she would like to use. She also wants to call her insurance to see what her out of pocket expense would be. She works for [employer] and only off on  and this was a problem in the past having someone available to help with getting her mask to fit. 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 Will have the RT contact her for an Epiworth sleep scale and to set up with DME for home sleep study.   We will have her return in 9 months with PFTs.  This will give her yearly PFTs.     This visit has been rescheduled as a phone visit to comply with patient safety concerns in accordance with CDC recommendations. Total time of discussion was 10:00 minutes.    VIN Ross  2020  10:58    No follow-ups on file.    This dictation was generated by voice recognition computer software. Although all attempts are made to edit dictation for accuracy, there may be errors in the transcription that are not intended.

## 2020-05-13 ENCOUNTER — OFFICE VISIT (OUTPATIENT)
Dept: PULMONOLOGY | Facility: CLINIC | Age: 65
End: 2020-05-13

## 2020-05-13 DIAGNOSIS — R53.83 FATIGUE, UNSPECIFIED TYPE: ICD-10-CM

## 2020-05-13 DIAGNOSIS — R06.83 SNORING: ICD-10-CM

## 2020-05-13 DIAGNOSIS — E66.01 MORBID OBESITY WITH BMI OF 40.0-44.9, ADULT (HCC): ICD-10-CM

## 2020-05-13 DIAGNOSIS — J45.30 MILD PERSISTENT ASTHMA WITHOUT COMPLICATION: Primary | ICD-10-CM

## 2020-05-13 DIAGNOSIS — R05.9 COUGH: ICD-10-CM

## 2020-05-13 PROCEDURE — 99212 OFFICE O/P EST SF 10 MIN: CPT | Performed by: NURSE PRACTITIONER

## 2020-05-13 RX ORDER — CETIRIZINE HYDROCHLORIDE 10 MG/1
10 TABLET ORAL NIGHTLY
COMMUNITY

## 2020-05-13 RX ORDER — MELATONIN
1000 2 TIMES DAILY
COMMUNITY

## 2020-05-13 RX ORDER — ALBUTEROL SULFATE 90 UG/1
2 AEROSOL, METERED RESPIRATORY (INHALATION) EVERY 6 HOURS PRN
COMMUNITY
End: 2021-11-17 | Stop reason: SDUPTHER

## 2020-05-13 RX ORDER — BENZONATATE 200 MG/1
CAPSULE ORAL
COMMUNITY
Start: 2020-04-16 | End: 2020-09-24 | Stop reason: ALTCHOICE

## 2020-06-14 ENCOUNTER — RESULTS ENCOUNTER (OUTPATIENT)
Dept: UROLOGY | Facility: CLINIC | Age: 65
End: 2020-06-14

## 2020-06-14 DIAGNOSIS — N20.0 RECURRENT KIDNEY STONES: ICD-10-CM

## 2020-06-16 ENCOUNTER — LAB (OUTPATIENT)
Dept: UROLOGY | Facility: CLINIC | Age: 65
End: 2020-06-16

## 2020-06-16 DIAGNOSIS — N20.0 RECURRENT KIDNEY STONES: Primary | ICD-10-CM

## 2020-07-10 ENCOUNTER — OFFICE VISIT (OUTPATIENT)
Dept: UROLOGY | Facility: CLINIC | Age: 65
End: 2020-07-10

## 2020-07-10 ENCOUNTER — HOSPITAL ENCOUNTER (OUTPATIENT)
Dept: GENERAL RADIOLOGY | Facility: HOSPITAL | Age: 65
Discharge: HOME | End: 2020-07-10
Admitting: UROLOGY

## 2020-07-10 VITALS — WEIGHT: 221 LBS | TEMPERATURE: 97.2 F | HEIGHT: 62 IN | BODY MASS INDEX: 40.67 KG/M2

## 2020-07-10 DIAGNOSIS — I10 ESSENTIAL HYPERTENSION: ICD-10-CM

## 2020-07-10 DIAGNOSIS — Z87.442 HISTORY OF KIDNEY STONES: ICD-10-CM

## 2020-07-10 DIAGNOSIS — N20.0 RECURRENT KIDNEY STONES: Primary | ICD-10-CM

## 2020-07-10 DIAGNOSIS — R53.83 FATIGUE, UNSPECIFIED TYPE: ICD-10-CM

## 2020-07-10 DIAGNOSIS — E03.9 ACQUIRED HYPOTHYROIDISM: ICD-10-CM

## 2020-07-10 DIAGNOSIS — R06.83 SNORING: ICD-10-CM

## 2020-07-10 LAB
BILIRUB BLD-MCNC: NEGATIVE MG/DL
CLARITY, POC: CLEAR
COLOR UR: YELLOW
GLUCOSE UR STRIP-MCNC: NEGATIVE MG/DL
KETONES UR QL: NEGATIVE
LEUKOCYTE EST, POC: NEGATIVE
NITRITE UR-MCNC: NEGATIVE MG/ML
PH UR: 7.5 [PH] (ref 5–8)
PROT UR STRIP-MCNC: NEGATIVE MG/DL
RBC # UR STRIP: NEGATIVE /UL
SP GR UR: 1.01 (ref 1–1.03)
UROBILINOGEN UR QL: NORMAL

## 2020-07-10 PROCEDURE — 81001 URINALYSIS AUTO W/SCOPE: CPT | Performed by: UROLOGY

## 2020-07-10 PROCEDURE — 99213 OFFICE O/P EST LOW 20 MIN: CPT | Performed by: UROLOGY

## 2020-07-10 PROCEDURE — 74018 RADEX ABDOMEN 1 VIEW: CPT

## 2020-07-10 NOTE — PATIENT INSTRUCTIONS

## 2020-07-11 PROBLEM — I10 ESSENTIAL HYPERTENSION: Status: ACTIVE | Noted: 2020-07-11

## 2020-07-11 PROBLEM — E03.9 ACQUIRED HYPOTHYROIDISM: Status: ACTIVE | Noted: 2020-07-11

## 2020-07-30 ENCOUNTER — TELEPHONE (OUTPATIENT)
Dept: PULMONOLOGY | Facility: CLINIC | Age: 65
End: 2020-07-30

## 2020-07-30 DIAGNOSIS — G47.33 OBSTRUCTIVE SLEEP APNEA (ADULT) (PEDIATRIC): Primary | ICD-10-CM

## 2020-07-30 NOTE — TELEPHONE ENCOUNTER
Please let patient know her CPAP compliance report shows her AHI is moderate at 25.8 and her Avg CA index is 11.7 ( avg index 8.5). She is having significant central events suggesting complex sleep apnea and needs an in hospital sleep lab visit. Please schedule.

## 2020-09-18 ENCOUNTER — TRANSCRIBE ORDERS (OUTPATIENT)
Dept: ADMINISTRATIVE | Facility: HOSPITAL | Age: 65
End: 2020-09-18

## 2020-09-18 DIAGNOSIS — Z01.818 PREOPERATIVE TESTING: Primary | ICD-10-CM

## 2020-09-22 ENCOUNTER — LAB (OUTPATIENT)
Dept: LAB | Facility: HOSPITAL | Age: 65
End: 2020-09-22

## 2020-09-22 DIAGNOSIS — Z01.818 PREOPERATIVE TESTING: ICD-10-CM

## 2020-09-22 PROCEDURE — C9803 HOPD COVID-19 SPEC COLLECT: HCPCS

## 2020-09-22 PROCEDURE — U0003 INFECTIOUS AGENT DETECTION BY NUCLEIC ACID (DNA OR RNA); SEVERE ACUTE RESPIRATORY SYNDROME CORONAVIRUS 2 (SARS-COV-2) (CORONAVIRUS DISEASE [COVID-19]), AMPLIFIED PROBE TECHNIQUE, MAKING USE OF HIGH THROUGHPUT TECHNOLOGIES AS DESCRIBED BY CMS-2020-01-R: HCPCS

## 2020-09-23 LAB
COVID LABCORP PRIORITY: NORMAL
SARS-COV-2 RNA RESP QL NAA+PROBE: NOT DETECTED

## 2020-09-24 ENCOUNTER — OFFICE VISIT (OUTPATIENT)
Dept: PULMONOLOGY | Facility: CLINIC | Age: 65
End: 2020-09-24

## 2020-09-24 VITALS
TEMPERATURE: 97.7 F | DIASTOLIC BLOOD PRESSURE: 70 MMHG | OXYGEN SATURATION: 95 % | WEIGHT: 201 LBS | HEIGHT: 62 IN | BODY MASS INDEX: 36.99 KG/M2 | HEART RATE: 76 BPM | SYSTOLIC BLOOD PRESSURE: 122 MMHG

## 2020-09-24 DIAGNOSIS — G47.33 OSA (OBSTRUCTIVE SLEEP APNEA): Primary | ICD-10-CM

## 2020-09-24 RX ORDER — NALTREXONE HYDROCHLORIDE 50 MG/1
50 TABLET, FILM COATED ORAL DAILY
COMMUNITY
End: 2023-02-02

## 2020-09-24 NOTE — PROGRESS NOTES
Patient was not seen today as she is scheduled to have her titration study tomorrow and follow up will need to be 31-90 days for compliance. She will call when she gets her new machine/ settings and we will look at scheduling her with in that time frame for a compliance check. She is agreeable to this.

## 2020-09-25 ENCOUNTER — HOSPITAL ENCOUNTER (OUTPATIENT)
Dept: SLEEP MEDICINE | Facility: HOSPITAL | Age: 65
Discharge: HOME | End: 2020-09-25
Admitting: NURSE PRACTITIONER

## 2020-09-25 VITALS
HEART RATE: 82 BPM | DIASTOLIC BLOOD PRESSURE: 70 MMHG | BODY MASS INDEX: 36.99 KG/M2 | OXYGEN SATURATION: 95 % | HEIGHT: 62 IN | SYSTOLIC BLOOD PRESSURE: 119 MMHG | RESPIRATION RATE: 16 BRPM | WEIGHT: 201 LBS

## 2020-09-25 DIAGNOSIS — G47.33 OBSTRUCTIVE SLEEP APNEA (ADULT) (PEDIATRIC): ICD-10-CM

## 2020-09-25 PROCEDURE — 95811 POLYSOM 6/>YRS CPAP 4/> PARM: CPT

## 2020-09-25 PROCEDURE — 95811 POLYSOM 6/>YRS CPAP 4/> PARM: CPT | Performed by: PSYCHIATRY & NEUROLOGY

## 2020-10-07 DIAGNOSIS — G47.33 OSA (OBSTRUCTIVE SLEEP APNEA): Primary | ICD-10-CM

## 2020-10-19 ENCOUNTER — TELEPHONE (OUTPATIENT)
Dept: NEUROLOGY | Facility: CLINIC | Age: 65
End: 2020-10-19

## 2020-10-19 DIAGNOSIS — G47.33 OBSTRUCTIVE SLEEP APNEA: Primary | ICD-10-CM

## 2020-10-19 NOTE — TELEPHONE ENCOUNTER
----- Message from Rudy Kruger PA-C sent at 10/16/2020 11:49 AM CDT -----  I would doubt the test first.  Let's repeat it again and make sure it is an accurate test and then, if so, I will address accordingly and see her sooner.  Could be a sleep disordered breathing disorder without apnea.  Also, Soraya, I will need a concurrent compliance download done at the same time as the repeat overnight oximetry.    mb    ----- Message -----  From: Tiffany Vu RRT  Sent: 10/14/2020   5:01 PM CDT  To: SANDY Ken, please look over this overnight.  She is following up with you in Dec for a PAP compliance.  Just wanted to make sure not needing some adjustment on her PAP device.  Thanks! Tiffany SAUCEDA

## 2020-10-22 DIAGNOSIS — G47.33 OBSTRUCTIVE SLEEP APNEA: ICD-10-CM

## 2020-12-15 ENCOUNTER — OFFICE VISIT (OUTPATIENT)
Dept: NEUROLOGY | Facility: CLINIC | Age: 65
End: 2020-12-15

## 2020-12-15 VITALS
OXYGEN SATURATION: 98 % | DIASTOLIC BLOOD PRESSURE: 76 MMHG | SYSTOLIC BLOOD PRESSURE: 120 MMHG | HEART RATE: 98 BPM | WEIGHT: 185 LBS | HEIGHT: 62 IN | BODY MASS INDEX: 34.04 KG/M2

## 2020-12-15 DIAGNOSIS — G47.33 OBSTRUCTIVE SLEEP APNEA: Primary | ICD-10-CM

## 2020-12-15 PROCEDURE — 99213 OFFICE O/P EST LOW 20 MIN: CPT | Performed by: PHYSICIAN ASSISTANT

## 2020-12-15 RX ORDER — ALLOPURINOL 300 MG/1
300 TABLET ORAL DAILY
COMMUNITY
Start: 2020-12-02 | End: 2021-07-07

## 2020-12-15 NOTE — PROGRESS NOTES
Neurology Progress Note      Chief Complaint:    Obstructive sleep apnea  Daytime hypersomnolence    Subjective     Subjective:  This is a very pleasant 65-year-old right-hand-dominant female routinely cared for by Shelton Jacobs DO, who in the past underwent home sleep study from an outside sleep facility demonstrating an AHI of 30-40.  The patient has lost 40 pounds over the last year on a keto diet, however, has had difficulties with excessive daytime sleepiness and fatigue despite being on CPAP for several years.  In January 2020, she quit.  It was reported at that time she was on an AutoPap setting of 4 to 20 cm and had central apneic events.    On 9/26/2020, the patient underwent polysomnography with titration demonstrating successful placement of CPAP at 8 cm water pressure with a resultant AHI of 5.1.  There were 4 central events.  Low SPO2 was 89%.    However, since that time, the patient continues to have some difficulties with daytime fatigue.  Compliance download from 11/15/2020-12/14/2020 demonstrates 96.7% compliance with an average usage time of 5 hours and 40 minutes.  AHI during this timeframe is 22.6 at a setting of CPAP at 8 cm H2O.  This device does not characterize or quantify central events.      Past Medical History:   Diagnosis Date   • Asthma    • Gout    • Hypertension    • Hypothyroid    • Insomnia    • Macular degeneration    • Restless leg syndrome      Past Surgical History:   Procedure Laterality Date   • ANKLE SURGERY Left    • CERVICAL BIOPSY  W/ LOOP ELECTRODE EXCISION     • CHOLECYSTECTOMY     • COLONOSCOPY     • DILATATION AND CURETTAGE     • ENDOSCOPY     • HYSTERECTOMY  2008    Menorrhagia   • MOLE REMOVAL     • TUBAL ABDOMINAL LIGATION  1993   • WRIST SURGERY Left     Ganglion cyst and tumor removal      Family History   Problem Relation Age of Onset   • Heart failure Mother    • Diabetes Mother    • COPD Father    • Diabetes Father    • Clotting disorder Sister    • Breast  cancer Neg Hx    • Ovarian cancer Neg Hx    • Colon cancer Neg Hx      Social History     Tobacco Use   • Smoking status: Never Smoker   • Smokeless tobacco: Never Used   Substance Use Topics   • Alcohol use: Yes     Comment: Occasional   • Drug use: No       Medications:  Current Outpatient Medications   Medication Sig Dispense Refill   • ADVAIR -21 MCG/ACT inhaler Inhale 2 puffs As Needed.     • albuterol sulfate  (90 Base) MCG/ACT inhaler Inhale 2 puffs Every 6 (Six) Hours As Needed for Wheezing.     • allopurinol (ZYLOPRIM) 300 MG tablet Take 300 mg by mouth Daily.     • cetirizine (ZyrTEC Allergy) 10 MG tablet Take 10 mg by mouth Every Night.     • cholecalciferol (VITAMIN D3) 25 MCG (1000 UT) tablet Take 1,000 Units by mouth 2 (Two) Times a Day.     • citalopram (CeleXA) 40 MG tablet Take 40 mg by mouth Daily.     • colestipol (COLESTID) 1 G tablet Take 1 g by mouth Daily.     • irbesartan-hydrochlorothiazide (AVALIDE) 300-12.5 MG tablet Take 1 tablet by mouth Daily.     • levothyroxine (SYNTHROID, LEVOTHROID) 50 MCG tablet Take 50 mcg by mouth Daily.     • metFORMIN (GLUCOPHAGE) 500 MG tablet Take 500 mg by mouth Daily With Breakfast.     • naltrexone (DEPADE) 50 MG tablet Take 50 mg by mouth Daily. Half a pill a day     • pantoprazole (PROTONIX) 40 MG EC tablet Take 40 mg by mouth Daily.     • rOPINIRole (REQUIP) 0.5 MG tablet Take 0.25 mg by mouth Daily.       No current facility-administered medications for this visit.        Allergies:    Fluoxetine, Latex, Nickel, Paxil [paroxetine hcl], and Iodine    Review of Systems:   -A 14 point review of systems is completed and is negative.      Objective      Vital Signs  Heart Rate:  [98] 98  BP: (120)/(76) 120/76    Physical Exam:    General Exam:  Head:  Normocephalic, atraumatic.  HEENT: PERRLA.  Full EOM.  Mallampati Class III anatomy.  Neck:  No lymphadenopathy, thyromegaly or bruit.  Neck circumference is 14 inches.  Cardiac:  Regular rate  and rhythm.  Normal S1, S2.  No murmur, rub or gallop.  Lungs:  Clear to auscultation bilaterally.  No wheeze, rales or rhonchi.  Abdomen:  Non-tender, Non-distended.  Bowel sounds normoactive.  Extremities: Full peripheral pulses.  No clubbing, cyanosis or edema.  Skin: No ulceration, breakdown or rash.       Results Review:        Assessment/Plan     Impression:  1.  Obstructive sleep apnea  2. History of central sleep apnea      Plan:  1. I have reviewed the current compliance download and findings of the previous polysomnography with the patient in detail.  The patient voices understanding and recognizes the need for adherence to the prescribed therapy.  They understand that a certain level of compliance allows for continued insurance coverage as well as adequate treatment of underlying apnea.  They understand the impact this has upon their overall health status and other medical comorbidities.  2. I have recommended instituting regular cardiovascular exercise in the form of walking, biking or swimming 30-40 minutes at a time at least 3-4 times per week.  3. Counseled on multimodal approach to treatment of sleep apnea to include but not limited to diet, exercise, sleep hygiene, compliance with pap therapy. Encouraged lateral sleeping position and to avoid sedatives or alcohol close to bedtime. Risks of untreated sleep apnea were discussed to include but not limited to HTN, heart disease, stroke, cardiac arrhythmia such as AFIB, and dementia.  4. I reviewed the compliance download with the patient in detail.  I believe at this time it would be preferable to switch her back to AutoPap with a setting of 6-16 cm H2O.  We will repeat a compliance download in 30 days and see if her AHI can be reduced successfully.  If we do get the AHI down to acceptable levels, I still may consider follow-up overnight oximetry to make sure that this is adequately taken care of her hypoxia.  Beyond this, once AHI is successfully  reduced and apnea is treated, we will see her on an annual basis for annual sleep compliance review.  She will call for any concerns or questions in the interim.        Rudy Kruger PA-C  12/15/20  10:43 CST
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
## 2021-02-17 ENCOUNTER — OFFICE VISIT (OUTPATIENT)
Dept: PULMONOLOGY | Facility: CLINIC | Age: 66
End: 2021-02-17

## 2021-02-17 ENCOUNTER — TELEPHONE (OUTPATIENT)
Dept: PULMONOLOGY | Facility: CLINIC | Age: 66
End: 2021-02-17

## 2021-02-17 VITALS
SYSTOLIC BLOOD PRESSURE: 122 MMHG | DIASTOLIC BLOOD PRESSURE: 70 MMHG | HEART RATE: 76 BPM | BODY MASS INDEX: 31.1 KG/M2 | HEIGHT: 62 IN | OXYGEN SATURATION: 98 % | WEIGHT: 169 LBS | TEMPERATURE: 97.7 F

## 2021-02-17 DIAGNOSIS — J45.20 MILD INTERMITTENT ASTHMA WITHOUT COMPLICATION: Primary | ICD-10-CM

## 2021-02-17 DIAGNOSIS — G47.33 OSA (OBSTRUCTIVE SLEEP APNEA): ICD-10-CM

## 2021-02-17 PROCEDURE — 99213 OFFICE O/P EST LOW 20 MIN: CPT | Performed by: NURSE PRACTITIONER

## 2021-02-17 NOTE — PATIENT INSTRUCTIONS
Contact Rudy Kruger PA-C to schedule follow up for compliance and any changes that need to be made to your CPAP.

## 2021-02-18 ENCOUNTER — TELEPHONE (OUTPATIENT)
Dept: NEUROLOGY | Facility: CLINIC | Age: 66
End: 2021-02-18

## 2021-02-18 DIAGNOSIS — G47.33 OBSTRUCTIVE SLEEP APNEA: Primary | ICD-10-CM

## 2021-02-18 NOTE — TELEPHONE ENCOUNTER
Provider: HANNAH VALENCIA     Caller:  VALERY ELAM   Relationship to Patient: PT    Phone Number: [phone]  Reason for Call:  PT CALLING IN STATES SHE RECENTLY SAW HER RESPITORY MD AND WAS ADVISED THAT PT SHOULD HAVE HAD A FOLLOW UP SCHEDULED AFTER CPAP SETTINGS WERE CHANGED BACK ON 12/15/2020. PER HANNAH MONCADA NOTE PT WAS SCHEDULED FOR 1 YR FOLLOW UP ON 12/16/2021 PT IS ASKING IF CPAP CHANGES HAVE HELPED OR DOES SHE NEED TO BE SEEN SOONER  PLEASE ADVISE

## 2021-03-25 DIAGNOSIS — G47.33 OBSTRUCTIVE SLEEP APNEA: Primary | ICD-10-CM

## 2021-06-09 ENCOUNTER — TELEPHONE (OUTPATIENT)
Dept: NEUROLOGY | Facility: CLINIC | Age: 66
End: 2021-06-09

## 2021-06-09 NOTE — TELEPHONE ENCOUNTER
Provider: ANNIE YOUNG     Caller: VÍCTOR ELAM     Relationship to Patient: SELF     Pharmacy: NA    Phone Number: [phone]    Reason for Call:  [facility] WILL BE SENDING HANNAH VALENCIA A PRINT OUT OF HER CPAP MACHINE  READINGS.     THE PATIENT STATES TO PLEASE VIEW THE READINGS AND SEE IF SHE NEEDS TO ADJUST THE SETTINGS.     SHE ALSO STATED TO LET YOU KNOW SHE HAS LOST 85 POUNDS.     PLEASE ADVISE.

## 2021-06-10 DIAGNOSIS — G47.33 OBSTRUCTIVE SLEEP APNEA: Primary | ICD-10-CM

## 2021-06-11 ENCOUNTER — APPOINTMENT (OUTPATIENT)
Dept: GENERAL RADIOLOGY | Facility: HOSPITAL | Age: 66
End: 2021-06-11

## 2021-06-11 ENCOUNTER — APPOINTMENT (OUTPATIENT)
Dept: CT IMAGING | Facility: HOSPITAL | Age: 66
End: 2021-06-11

## 2021-06-11 ENCOUNTER — HOSPITAL ENCOUNTER (EMERGENCY)
Facility: HOSPITAL | Age: 66
Discharge: HOME | End: 2021-06-12
Attending: EMERGENCY MEDICINE | Admitting: EMERGENCY MEDICINE

## 2021-06-11 DIAGNOSIS — S50.02XA CONTUSION OF LEFT ELBOW, INITIAL ENCOUNTER: ICD-10-CM
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
## 2021-06-11 DIAGNOSIS — S13.9XXA NECK SPRAIN, INITIAL ENCOUNTER: ICD-10-CM

## 2021-06-11 DIAGNOSIS — S20.219A CONTUSION OF CHEST WALL, UNSPECIFIED LATERALITY, INITIAL ENCOUNTER: ICD-10-CM

## 2021-06-11 DIAGNOSIS — S32.040A COMPRESSION FRACTURE OF L4 LUMBAR VERTEBRA, CLOSED, INITIAL ENCOUNTER (HCC): Primary | ICD-10-CM

## 2021-06-11 DIAGNOSIS — S22.000A COMPRESSION FRACTURE OF BODY OF THORACIC VERTEBRA (HCC): ICD-10-CM

## 2021-06-11 DIAGNOSIS — S30.1XXA CONTUSION OF ABDOMINAL WALL, INITIAL ENCOUNTER: ICD-10-CM

## 2021-06-11 LAB
ALBUMIN SERPL-MCNC: 4.1 G/DL (ref 3.5–5.2)
ALBUMIN/GLOB SERPL: 1.4 G/DL
ALP SERPL-CCNC: 94 U/L (ref 39–117)
ALT SERPL W P-5'-P-CCNC: 16 U/L (ref 1–33)
ANION GAP SERPL CALCULATED.3IONS-SCNC: 14 MMOL/L (ref 5–15)
AST SERPL-CCNC: 25 U/L (ref 1–32)
BASOPHILS # BLD AUTO: 0.06 10*3/MM3 (ref 0–0.2)
BASOPHILS NFR BLD AUTO: 0.7 % (ref 0–1.5)
BILIRUB SERPL-MCNC: 0.2 MG/DL (ref 0–1.2)
BUN SERPL-MCNC: 19 MG/DL (ref 8–23)
BUN/CREAT SERPL: 27.5 (ref 7–25)
CALCIUM SPEC-SCNC: 9.3 MG/DL (ref 8.6–10.5)
CHLORIDE SERPL-SCNC: 100 MMOL/L (ref 98–107)
CK SERPL-CCNC: 106 U/L (ref 20–180)
CO2 SERPL-SCNC: 25 MMOL/L (ref 22–29)
CREAT SERPL-MCNC: 0.69 MG/DL (ref 0.57–1)
DEPRECATED RDW RBC AUTO: 41.1 FL (ref 37–54)
EOSINOPHIL # BLD AUTO: 0.06 10*3/MM3 (ref 0–0.4)
EOSINOPHIL NFR BLD AUTO: 0.7 % (ref 0.3–6.2)
ERYTHROCYTE [DISTWIDTH] IN BLOOD BY AUTOMATED COUNT: 13.3 % (ref 12.3–15.4)
GFR SERPL CREATININE-BSD FRML MDRD: 85 ML/MIN/1.73
GLOBULIN UR ELPH-MCNC: 2.9 GM/DL
GLUCOSE SERPL-MCNC: 132 MG/DL (ref 65–99)
HCT VFR BLD AUTO: 41.1 % (ref 34–46.6)
HGB BLD-MCNC: 13.4 G/DL (ref 12–15.9)
HOLD SPECIMEN: NORMAL
HOLD SPECIMEN: NORMAL
IMM GRANULOCYTES # BLD AUTO: 0.31 10*3/MM3 (ref 0–0.05)
IMM GRANULOCYTES NFR BLD AUTO: 3.4 % (ref 0–0.5)
LIPASE SERPL-CCNC: 60 U/L (ref 13–60)
LYMPHOCYTES # BLD AUTO: 2.39 10*3/MM3 (ref 0.7–3.1)
LYMPHOCYTES NFR BLD AUTO: 26.1 % (ref 19.6–45.3)
MCH RBC QN AUTO: 27.6 PG (ref 26.6–33)
MCHC RBC AUTO-ENTMCNC: 32.6 G/DL (ref 31.5–35.7)
MCV RBC AUTO: 84.6 FL (ref 79–97)
MONOCYTES # BLD AUTO: 0.43 10*3/MM3 (ref 0.1–0.9)
MONOCYTES NFR BLD AUTO: 4.7 % (ref 5–12)
NEUTROPHILS NFR BLD AUTO: 5.89 10*3/MM3 (ref 1.7–7)
NEUTROPHILS NFR BLD AUTO: 64.4 % (ref 42.7–76)
NRBC BLD AUTO-RTO: 0 /100 WBC (ref 0–0.2)
NT-PROBNP SERPL-MCNC: 74.5 PG/ML (ref 0–900)
PLATELET # BLD AUTO: 308 10*3/MM3 (ref 140–450)
PMV BLD AUTO: 10.8 FL (ref 6–12)
POTASSIUM SERPL-SCNC: 3.6 MMOL/L (ref 3.5–5.2)
PROT SERPL-MCNC: 7 G/DL (ref 6–8.5)
RBC # BLD AUTO: 4.86 10*6/MM3 (ref 3.77–5.28)
SODIUM SERPL-SCNC: 139 MMOL/L (ref 136–145)
TROPONIN T SERPL-MCNC: <0.01 NG/ML (ref 0–0.03)
WBC # BLD AUTO: 9.14 10*3/MM3 (ref 3.4–10.8)
WHOLE BLOOD HOLD SPECIMEN: NORMAL

## 2021-06-11 PROCEDURE — 96375 TX/PRO/DX INJ NEW DRUG ADDON: CPT

## 2021-06-11 PROCEDURE — 25010000002 MORPHINE PER 10 MG: Performed by: EMERGENCY MEDICINE

## 2021-06-11 PROCEDURE — 93010 ELECTROCARDIOGRAM REPORT: CPT | Performed by: EMERGENCY MEDICINE

## 2021-06-11 PROCEDURE — 72125 CT NECK SPINE W/O DYE: CPT

## 2021-06-11 PROCEDURE — 90471 IMMUNIZATION ADMIN: CPT | Performed by: EMERGENCY MEDICINE

## 2021-06-11 PROCEDURE — 99284 EMERGENCY DEPT VISIT MOD MDM: CPT

## 2021-06-11 PROCEDURE — 84484 ASSAY OF TROPONIN QUANT: CPT | Performed by: EMERGENCY MEDICINE

## 2021-06-11 PROCEDURE — 25010000002 TDAP 5-2.5-18.5 LF-MCG/0.5 SUSPENSION: Performed by: EMERGENCY MEDICINE

## 2021-06-11 PROCEDURE — 71045 X-RAY EXAM CHEST 1 VIEW: CPT

## 2021-06-11 PROCEDURE — 83690 ASSAY OF LIPASE: CPT | Performed by: EMERGENCY MEDICINE

## 2021-06-11 PROCEDURE — 73080 X-RAY EXAM OF ELBOW: CPT

## 2021-06-11 PROCEDURE — 96374 THER/PROPH/DIAG INJ IV PUSH: CPT

## 2021-06-11 PROCEDURE — 74177 CT ABD & PELVIS W/CONTRAST: CPT

## 2021-06-11 PROCEDURE — 70450 CT HEAD/BRAIN W/O DYE: CPT

## 2021-06-11 PROCEDURE — 25010000002 DIPHENHYDRAMINE PER 50 MG: Performed by: EMERGENCY MEDICINE

## 2021-06-11 PROCEDURE — 82550 ASSAY OF CK (CPK): CPT | Performed by: EMERGENCY MEDICINE

## 2021-06-11 PROCEDURE — 72128 CT CHEST SPINE W/O DYE: CPT

## 2021-06-11 PROCEDURE — 25010000002 DEXAMETHASONE PER 1 MG: Performed by: EMERGENCY MEDICINE

## 2021-06-11 PROCEDURE — 90715 TDAP VACCINE 7 YRS/> IM: CPT | Performed by: EMERGENCY MEDICINE

## 2021-06-11 PROCEDURE — 80053 COMPREHEN METABOLIC PANEL: CPT | Performed by: EMERGENCY MEDICINE

## 2021-06-11 PROCEDURE — 85025 COMPLETE CBC W/AUTO DIFF WBC: CPT | Performed by: EMERGENCY MEDICINE

## 2021-06-11 PROCEDURE — 71260 CT THORAX DX C+: CPT

## 2021-06-11 PROCEDURE — 83880 ASSAY OF NATRIURETIC PEPTIDE: CPT | Performed by: EMERGENCY MEDICINE

## 2021-06-11 PROCEDURE — 93005 ELECTROCARDIOGRAM TRACING: CPT | Performed by: EMERGENCY MEDICINE

## 2021-06-11 PROCEDURE — 72131 CT LUMBAR SPINE W/O DYE: CPT

## 2021-06-11 PROCEDURE — 25010000002 ORPHENADRINE CITRATE PER 60 MG: Performed by: EMERGENCY MEDICINE

## 2021-06-11 PROCEDURE — 85610 PROTHROMBIN TIME: CPT | Performed by: EMERGENCY MEDICINE

## 2021-06-11 PROCEDURE — 25010000002 ONDANSETRON PER 1 MG: Performed by: EMERGENCY MEDICINE

## 2021-06-11 PROCEDURE — 81003 URINALYSIS AUTO W/O SCOPE: CPT | Performed by: EMERGENCY MEDICINE

## 2021-06-11 PROCEDURE — 85730 THROMBOPLASTIN TIME PARTIAL: CPT | Performed by: EMERGENCY MEDICINE

## 2021-06-11 RX ORDER — ORPHENADRINE CITRATE 30 MG/ML
60 INJECTION INTRAMUSCULAR; INTRAVENOUS ONCE
Status: COMPLETED | OUTPATIENT
Start: 2021-06-11 | End: 2021-06-11

## 2021-06-11 RX ORDER — SODIUM CHLORIDE 0.9 % (FLUSH) 0.9 %
10 SYRINGE (ML) INJECTION AS NEEDED
Status: DISCONTINUED | OUTPATIENT
Start: 2021-06-11 | End: 2021-06-12 | Stop reason: HOSPADM

## 2021-06-11 RX ORDER — DEXAMETHASONE SODIUM PHOSPHATE 10 MG/ML
10 INJECTION INTRAMUSCULAR; INTRAVENOUS ONCE
Status: COMPLETED | OUTPATIENT
Start: 2021-06-11 | End: 2021-06-11

## 2021-06-11 RX ORDER — DIPHENHYDRAMINE HYDROCHLORIDE 50 MG/ML
50 INJECTION INTRAMUSCULAR; INTRAVENOUS ONCE
Status: COMPLETED | OUTPATIENT
Start: 2021-06-11 | End: 2021-06-11

## 2021-06-11 RX ORDER — ONDANSETRON 2 MG/ML
4 INJECTION INTRAMUSCULAR; INTRAVENOUS ONCE
Status: COMPLETED | OUTPATIENT
Start: 2021-06-11 | End: 2021-06-11

## 2021-06-11 RX ADMIN — ORPHENADRINE CITRATE 60 MG: 60 INJECTION INTRAMUSCULAR; INTRAVENOUS at 23:32

## 2021-06-11 RX ADMIN — TETANUS TOXOID, REDUCED DIPHTHERIA TOXOID AND ACELLULAR PERTUSSIS VACCINE, ADSORBED 0.5 ML: 5; 2.5; 8; 8; 2.5 SUSPENSION INTRAMUSCULAR at 23:33

## 2021-06-11 RX ADMIN — DEXAMETHASONE SODIUM PHOSPHATE 10 MG: 10 INJECTION INTRAMUSCULAR; INTRAVENOUS at 23:32

## 2021-06-11 RX ADMIN — MORPHINE SULFATE 4 MG: 4 INJECTION, SOLUTION INTRAMUSCULAR; INTRAVENOUS at 23:32

## 2021-06-11 RX ADMIN — DIPHENHYDRAMINE HYDROCHLORIDE 50 MG: 50 INJECTION INTRAMUSCULAR; INTRAVENOUS at 23:32

## 2021-06-11 RX ADMIN — ONDANSETRON 4 MG: 2 INJECTION INTRAMUSCULAR; INTRAVENOUS at 23:33

## 2021-06-12 VITALS
OXYGEN SATURATION: 97 % | WEIGHT: 158 LBS | RESPIRATION RATE: 16 BRPM | SYSTOLIC BLOOD PRESSURE: 122 MMHG | TEMPERATURE: 98.2 F | HEART RATE: 84 BPM | DIASTOLIC BLOOD PRESSURE: 54 MMHG | BODY MASS INDEX: 29.08 KG/M2 | HEIGHT: 62 IN

## 2021-06-12 LAB
APTT PPP: 29.7 SECONDS (ref 24.1–35)
BILIRUB UR QL STRIP: NEGATIVE
CLARITY UR: CLEAR
COLOR UR: YELLOW
GLUCOSE UR STRIP-MCNC: NEGATIVE MG/DL
HGB UR QL STRIP.AUTO: NEGATIVE
INR PPP: 0.94 (ref 0.91–1.09)
KETONES UR QL STRIP: NEGATIVE
LEUKOCYTE ESTERASE UR QL STRIP.AUTO: NEGATIVE
NITRITE UR QL STRIP: NEGATIVE
PH UR STRIP.AUTO: 6 [PH] (ref 5–8)
PROT UR QL STRIP: NEGATIVE
PROTHROMBIN TIME: 11.8 SECONDS (ref 11.5–13.4)
QT INTERVAL: 392 MS
QTC INTERVAL: 474 MS
SP GR UR STRIP: 1.01 (ref 1–1.03)
UROBILINOGEN UR QL STRIP: NORMAL

## 2021-06-12 PROCEDURE — 25010000002 IOPAMIDOL 61 % SOLUTION: Performed by: EMERGENCY MEDICINE

## 2021-06-12 RX ORDER — OXYCODONE HYDROCHLORIDE AND ACETAMINOPHEN 5; 325 MG/1; MG/1
1 TABLET ORAL EVERY 6 HOURS PRN
Qty: 10 TABLET | Refills: 0 | Status: SHIPPED | OUTPATIENT
Start: 2021-06-12 | End: 2021-07-07

## 2021-06-12 RX ADMIN — IOPAMIDOL 100 ML: 612 INJECTION, SOLUTION INTRAVENOUS at 00:13
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
## 2021-06-30 ENCOUNTER — TELEPHONE (OUTPATIENT)
Dept: NEUROSURGERY | Facility: CLINIC | Age: 66
End: 2021-06-30

## 2021-07-01 ENCOUNTER — OFFICE VISIT (OUTPATIENT)
Dept: NEUROSURGERY | Facility: CLINIC | Age: 66
End: 2021-07-01

## 2021-07-01 ENCOUNTER — HOSPITAL ENCOUNTER (OUTPATIENT)
Dept: GENERAL RADIOLOGY | Facility: HOSPITAL | Age: 66
Discharge: HOME | End: 2021-07-01
Admitting: NURSE PRACTITIONER

## 2021-07-01 VITALS — HEIGHT: 62 IN | BODY MASS INDEX: 28.6 KG/M2 | WEIGHT: 155.4 LBS

## 2021-07-01 DIAGNOSIS — S32.010A COMPRESSION FRACTURE OF L1 VERTEBRA, INITIAL ENCOUNTER (HCC): ICD-10-CM

## 2021-07-01 DIAGNOSIS — S32.040B: Primary | ICD-10-CM

## 2021-07-01 DIAGNOSIS — S22.080A COMPRESSION FRACTURE OF T12 VERTEBRA, INITIAL ENCOUNTER (HCC): ICD-10-CM

## 2021-07-01 DIAGNOSIS — E66.3 OVERWEIGHT (BMI 25.0-29.9): ICD-10-CM

## 2021-07-01 DIAGNOSIS — S32.040B: ICD-10-CM

## 2021-07-01 PROCEDURE — 72100 X-RAY EXAM L-S SPINE 2/3 VWS: CPT

## 2021-07-01 PROCEDURE — 99214 OFFICE O/P EST MOD 30 MIN: CPT | Performed by: NURSE PRACTITIONER

## 2021-07-01 RX ORDER — HYDROCODONE BITARTRATE AND ACETAMINOPHEN 7.5; 325 MG/1; MG/1
TABLET ORAL
COMMUNITY
Start: 2021-06-21 | End: 2021-08-04
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
## 2021-07-01 NOTE — PATIENT INSTRUCTIONS
"https://www.nhlbi.nih.gov/files/docs/public/heart/dash_brief.pdf\">   DASH Eating Plan  DASH stands for Dietary Approaches to Stop Hypertension. The DASH eating plan is a healthy eating plan that has been shown to:  · Reduce high blood pressure (hypertension).  · Reduce your risk for type 2 diabetes, heart disease, and stroke.  · Help with weight loss.  What are tips for following this plan?  Reading food labels  · Check food labels for the amount of salt (sodium) per serving. Choose foods with less than 5 percent of the Daily Value of sodium. Generally, foods with less than 300 milligrams (mg) of sodium per serving fit into this eating plan.  · To find whole grains, look for the word \"whole\" as the first word in the ingredient list.  Shopping  · Buy products labeled as \"low-sodium\" or \"no salt added.\"  · Buy fresh foods. Avoid canned foods and pre-made or frozen meals.  Cooking  · Avoid adding salt when cooking. Use salt-free seasonings or herbs instead of table salt or sea salt. Check with your health care provider or pharmacist before using salt substitutes.  · Do not dwyer foods. Cook foods using healthy methods such as baking, boiling, grilling, roasting, and broiling instead.  · Cook with heart-healthy oils, such as olive, canola, avocado, soybean, or sunflower oil.  Meal planning    · Eat a balanced diet that includes:  ? 4 or more servings of fruits and 4 or more servings of vegetables each day. Try to fill one-half of your plate with fruits and vegetables.  ? 6-8 servings of whole grains each day.  ? Less than 6 oz (170 g) of lean meat, poultry, or fish each day. A 3-oz (85-g) serving of meat is about the same size as a deck of cards. One egg equals 1 oz (28 g).  ? 2-3 servings of low-fat dairy each day. One serving is 1 cup (237 mL).  ? 1 serving of nuts, seeds, or beans 5 times each week.  ? 2-3 servings of heart-healthy fats. Healthy fats called omega-3 fatty acids are found in foods such as walnuts, " flaxseeds, fortified milks, and eggs. These fats are also found in cold-water fish, such as sardines, salmon, and mackerel.  · Limit how much you eat of:  ? Canned or prepackaged foods.  ? Food that is high in trans fat, such as some fried foods.  ? Food that is high in saturated fat, such as fatty meat.  ? Desserts and other sweets, sugary drinks, and other foods with added sugar.  ? Full-fat dairy products.  · Do not salt foods before eating.  · Do not eat more than 4 egg yolks a week.  · Try to eat at least 2 vegetarian meals a week.  · Eat more home-cooked food and less restaurant, buffet, and fast food.  Lifestyle  · When eating at a restaurant, ask that your food be prepared with less salt or no salt, if possible.  · If you drink alcohol:  ? Limit how much you use to:  § 0-1 drink a day for women who are not pregnant.  § 0-2 drinks a day for men.  ? Be aware of how much alcohol is in your drink. In the [place] one drink equals one 12 oz bottle of beer (355 mL), one 5 oz glass of wine (148 mL), or one 1½ oz glass of hard liquor (44 mL).  General information  · Avoid eating more than 2,300 mg of salt a day. If you have hypertension, you may need to reduce your sodium intake to 1,500 mg a day.  · Work with your health care provider to maintain a healthy body weight or to lose weight. Ask what an ideal weight is for you.  · Get at least 30 minutes of exercise that causes your heart to beat faster (aerobic exercise) most days of the week. Activities may include walking, swimming, or biking.  · Work with your health care provider or dietitian to adjust your eating plan to your individual calorie needs.  What foods should I eat?  Fruits  All fresh, dried, or frozen fruit. Canned fruit in natural juice (without added sugar).  Vegetables  Fresh or frozen vegetables (raw, steamed, roasted, or grilled). Low-sodium or reduced-sodium tomato and vegetable juice. Low-sodium or reduced-sodium tomato sauce and tomato paste.  Low-sodium or reduced-sodium canned vegetables.  Grains  Whole-grain or whole-wheat bread. Whole-grain or whole-wheat pasta. Brown rice. Oatmeal. Quinoa. Bulgur. Whole-grain and low-sodium cereals. Keira bread. Low-fat, low-sodium crackers. Whole-wheat flour tortillas.  Meats and other proteins  Skinless chicken or turkey. Ground chicken or turkey. Pork with fat trimmed off. Fish and seafood. Egg whites. Dried beans, peas, or lentils. Unsalted nuts, nut butters, and seeds. Unsalted canned beans. Lean cuts of beef with fat trimmed off. Low-sodium, lean precooked or cured meat, such as sausages or meat loaves.  Dairy  Low-fat (1%) or fat-free (skim) milk. Reduced-fat, low-fat, or fat-free cheeses. Nonfat, low-sodium ricotta or cottage cheese. Low-fat or nonfat yogurt. Low-fat, low-sodium cheese.  Fats and oils  Soft margarine without trans fats. Vegetable oil. Reduced-fat, low-fat, or light mayonnaise and salad dressings (reduced-sodium). Canola, safflower, olive, avocado, soybean, and sunflower oils. Avocado.  Seasonings and condiments  Herbs. Spices. Seasoning mixes without salt.  Other foods  Unsalted popcorn and pretzels. Fat-free sweets.  The items listed above may not be a complete list of foods and beverages you can eat. Contact a dietitian for more information.  What foods should I avoid?  Fruits  Canned fruit in a light or heavy syrup. Fried fruit. Fruit in cream or butter sauce.  Vegetables  Creamed or fried vegetables. Vegetables in a cheese sauce. Regular canned vegetables (not low-sodium or reduced-sodium). Regular canned tomato sauce and paste (not low-sodium or reduced-sodium). Regular tomato and vegetable juice (not low-sodium or reduced-sodium). Pickles. Olives.  Grains  Baked goods made with fat, such as croissants, muffins, or some breads. Dry pasta or rice meal packs.  Meats and other proteins  Fatty cuts of meat. Ribs. Fried meat. Martell. Bologna, salami, and other precooked or cured meats, such as  sausages or meat loaves. Fat from the back of a pig (fatback). Bratwurst. Salted nuts and seeds. Canned beans with added salt. Canned or smoked fish. Whole eggs or egg yolks. Chicken or turkey with skin.  Dairy  Whole or 2% milk, cream, and half-and-half. Whole or full-fat cream cheese. Whole-fat or sweetened yogurt. Full-fat cheese. Nondairy creamers. Whipped toppings. Processed cheese and cheese spreads.  Fats and oils  Butter. Stick margarine. Lard. Shortening. Ghee. Martell fat. Tropical oils, such as coconut, palm kernel, or palm oil.  Seasonings and condiments  Onion salt, garlic salt, seasoned salt, table salt, and sea salt. Worcestershire sauce. Tartar sauce. Barbecue sauce. Teriyaki sauce. Soy sauce, including reduced-sodium. Steak sauce. Canned and packaged gravies. Fish sauce. Oyster sauce. Cocktail sauce. Store-bought horseradish. Ketchup. Mustard. Meat flavorings and tenderizers. Bouillon cubes. Hot sauces. Pre-made or packaged marinades. Pre-made or packaged taco seasonings. Relishes. Regular salad dressings.  Other foods  Salted popcorn and pretzels.  The items listed above may not be a complete list of foods and beverages you should avoid. Contact a dietitian for more information.  Where to find more information  · National Heart, Lung, and Blood [place]: www.nhlbi.nih.gov  · American Heart Association: www.heart.org  · Academy of Nutrition and Dietetics: www.eatright.org  · National Kidney Foundation: www.kidney.org  Summary  · The DASH eating plan is a healthy eating plan that has been shown to reduce high blood pressure (hypertension). It may also reduce your risk for type 2 diabetes, heart disease, and stroke.  · When on the DASH eating plan, aim to eat more fresh fruits and vegetables, whole grains, lean proteins, low-fat dairy, and heart-healthy fats.  · With the DASH eating plan, you should limit salt (sodium) intake to 2,300 mg a day. If you have hypertension, you may need to reduce your  sodium intake to 1,500 mg a day.  · Work with your health care provider or dietitian to adjust your eating plan to your individual calorie needs.  This information is not intended to replace advice given to you by your health care provider. Make sure you discuss any questions you have with your health care provider.  Document Revised: 11/20/2020 Document Reviewed: 11/20/2020  [employer] Patient Education © 2021 [employer]

## 2021-07-07 ENCOUNTER — OFFICE VISIT (OUTPATIENT)
Dept: UROLOGY | Facility: CLINIC | Age: 66
End: 2021-07-07

## 2021-07-07 ENCOUNTER — HOSPITAL ENCOUNTER (OUTPATIENT)
Dept: GENERAL RADIOLOGY | Facility: HOSPITAL | Age: 66
Discharge: HOME | End: 2021-07-07
Admitting: UROLOGY

## 2021-07-07 VITALS — WEIGHT: 161 LBS | HEIGHT: 62 IN | TEMPERATURE: 97.6 F | BODY MASS INDEX: 29.63 KG/M2

## 2021-07-07 DIAGNOSIS — N28.1 CYST OF RIGHT KIDNEY: ICD-10-CM

## 2021-07-07 DIAGNOSIS — N20.0 RECURRENT KIDNEY STONES: Primary | ICD-10-CM

## 2021-07-07 DIAGNOSIS — Z87.442 HISTORY OF KIDNEY STONES: ICD-10-CM

## 2021-07-07 LAB
BILIRUB BLD-MCNC: NEGATIVE MG/DL
CLARITY, POC: CLEAR
COLOR UR: YELLOW
GLUCOSE UR STRIP-MCNC: NEGATIVE MG/DL
KETONES UR QL: NEGATIVE
LEUKOCYTE EST, POC: NEGATIVE
NITRITE UR-MCNC: NEGATIVE MG/ML
PH UR: 5.5 [PH] (ref 5–8)
PROT UR STRIP-MCNC: NEGATIVE MG/DL
RBC # UR STRIP: NEGATIVE /UL
SP GR UR: 1.02 (ref 1–1.03)
UROBILINOGEN UR QL: NORMAL

## 2021-07-07 PROCEDURE — 74018 RADEX ABDOMEN 1 VIEW: CPT

## 2021-07-07 PROCEDURE — 99213 OFFICE O/P EST LOW 20 MIN: CPT | Performed by: PHYSICIAN ASSISTANT

## 2021-07-07 PROCEDURE — 81001 URINALYSIS AUTO W/SCOPE: CPT | Performed by: PHYSICIAN ASSISTANT

## 2021-07-07 RX ORDER — IRBESARTAN 75 MG/1
75 TABLET ORAL DAILY
COMMUNITY
Start: 2021-06-10 | End: 2023-02-02 | Stop reason: DRUGHIGH

## 2021-07-07 RX ORDER — ALLOPURINOL 100 MG/1
100 TABLET ORAL DAILY
COMMUNITY
Start: 2021-05-21

## 2021-07-12 ENCOUNTER — TELEPHONE (OUTPATIENT)
Dept: NEUROSURGERY | Facility: CLINIC | Age: 66
End: 2021-07-12

## 2021-07-12 NOTE — TELEPHONE ENCOUNTER
Caller: Salud Daley    Relationship: Self    Best call back number:[phone]    What form or medical record are you requesting: RETURN TO WORK LETTER AND A DISABILITY STATEMENT     Who is requesting this form or medical record from you: EMPLOYER AND [employer]    How would you like to receive the form or medical records (pick-up, mail, fax):PATIENT TO  WEDNESDAY  If fax, what is the fax number:   If mail, what is the address:   If pick-up, provide patient with address and location details    Timeframe paperwork needed:ASAP    Additional notes:PATIENT NEED A LETTER STATING IT IS OKAY FOR HER TO RETURN TO WORK. PATIENT STATES SHE RETURNED TO WORK ON July 9, AND WOULD LIKE THE LETTER TO STATE THAT IT WAS OKAY FOR HER TO RETURN TO WORK ON July 9.    PATIENT ALSO STATES SHE NEED A LETTER STATING THE BEGINNING DATE OF DISABILITY AND THE END DATE FOR THE CAR INSURANCE. CLAIM NUMBER [id number]    PLEASE CALL AND ADVISE     THANK YOU

## 2021-07-12 NOTE — TELEPHONE ENCOUNTER
Pt says she went back to work on 7/9/2021.   She is wanting a ltr.   Do you approve she can go back to work.

## 2021-07-14 NOTE — TELEPHONE ENCOUNTER
Pt called: she would like to know if her RTW letter and a statement for her insurance company is available to pickup? Please advise!      Pt contact: [phone]  Best time to call: anytime

## 2021-08-04 ENCOUNTER — OFFICE VISIT (OUTPATIENT)
Dept: NEUROSURGERY | Facility: CLINIC | Age: 66
End: 2021-08-04

## 2021-08-04 ENCOUNTER — HOSPITAL ENCOUNTER (OUTPATIENT)
Dept: MRI IMAGING | Facility: HOSPITAL | Age: 66
Discharge: HOME | End: 2021-08-04

## 2021-08-04 ENCOUNTER — HOSPITAL ENCOUNTER (OUTPATIENT)
Dept: GENERAL RADIOLOGY | Facility: HOSPITAL | Age: 66
Discharge: HOME | End: 2021-08-04

## 2021-08-04 VITALS — HEIGHT: 62 IN | WEIGHT: 161 LBS | BODY MASS INDEX: 29.63 KG/M2

## 2021-08-04 DIAGNOSIS — S32.040B: ICD-10-CM

## 2021-08-04 DIAGNOSIS — S32.010A COMPRESSION FRACTURE OF L1 VERTEBRA, INITIAL ENCOUNTER (HCC): ICD-10-CM

## 2021-08-04 DIAGNOSIS — S22.080A COMPRESSION FRACTURE OF T12 VERTEBRA, INITIAL ENCOUNTER (HCC): ICD-10-CM

## 2021-08-04 DIAGNOSIS — S22.080A COMPRESSION FRACTURE OF T12 VERTEBRA, INITIAL ENCOUNTER (HCC): Primary | ICD-10-CM

## 2021-08-04 DIAGNOSIS — E66.3 OVERWEIGHT (BMI 25.0-29.9): ICD-10-CM

## 2021-08-04 PROCEDURE — 72148 MRI LUMBAR SPINE W/O DYE: CPT

## 2021-08-04 PROCEDURE — 99214 OFFICE O/P EST MOD 30 MIN: CPT | Performed by: NURSE PRACTITIONER

## 2021-08-04 PROCEDURE — 72100 X-RAY EXAM L-S SPINE 2/3 VWS: CPT

## 2021-08-04 RX ORDER — MULTIPLE VITAMINS W/ MINERALS TAB 9MG-400MCG
1 TAB ORAL DAILY
COMMUNITY
End: 2022-07-09

## 2021-08-04 RX ORDER — PHENOL 1.4 %
600 AEROSOL, SPRAY (ML) MUCOUS MEMBRANE DAILY
COMMUNITY
End: 2022-07-09

## 2021-08-04 NOTE — PATIENT INSTRUCTIONS
"https://www.nhlbi.nih.gov/files/docs/public/heart/dash_brief.pdf\">   DASH Eating Plan  DASH stands for Dietary Approaches to Stop Hypertension. The DASH eating plan is a healthy eating plan that has been shown to:  · Reduce high blood pressure (hypertension).  · Reduce your risk for type 2 diabetes, heart disease, and stroke.  · Help with weight loss.  What are tips for following this plan?  Reading food labels  · Check food labels for the amount of salt (sodium) per serving. Choose foods with less than 5 percent of the Daily Value of sodium. Generally, foods with less than 300 milligrams (mg) of sodium per serving fit into this eating plan.  · To find whole grains, look for the word \"whole\" as the first word in the ingredient list.  Shopping  · Buy products labeled as \"low-sodium\" or \"no salt added.\"  · Buy fresh foods. Avoid canned foods and pre-made or frozen meals.  Cooking  · Avoid adding salt when cooking. Use salt-free seasonings or herbs instead of table salt or sea salt. Check with your health care provider or pharmacist before using salt substitutes.  · Do not dwyer foods. Cook foods using healthy methods such as baking, boiling, grilling, roasting, and broiling instead.  · Cook with heart-healthy oils, such as olive, canola, avocado, soybean, or sunflower oil.  Meal planning    · Eat a balanced diet that includes:  ? 4 or more servings of fruits and 4 or more servings of vegetables each day. Try to fill one-half of your plate with fruits and vegetables.  ? 6-8 servings of whole grains each day.  ? Less than 6 oz (170 g) of lean meat, poultry, or fish each day. A 3-oz (85-g) serving of meat is about the same size as a deck of cards. One egg equals 1 oz (28 g).  ? 2-3 servings of low-fat dairy each day. One serving is 1 cup (237 mL).  ? 1 serving of nuts, seeds, or beans 5 times each week.  ? 2-3 servings of heart-healthy fats. Healthy fats called omega-3 fatty acids are found in foods such as walnuts, " flaxseeds, fortified milks, and eggs. These fats are also found in cold-water fish, such as sardines, salmon, and mackerel.  · Limit how much you eat of:  ? Canned or prepackaged foods.  ? Food that is high in trans fat, such as some fried foods.  ? Food that is high in saturated fat, such as fatty meat.  ? Desserts and other sweets, sugary drinks, and other foods with added sugar.  ? Full-fat dairy products.  · Do not salt foods before eating.  · Do not eat more than 4 egg yolks a week.  · Try to eat at least 2 vegetarian meals a week.  · Eat more home-cooked food and less restaurant, buffet, and fast food.  Lifestyle  · When eating at a restaurant, ask that your food be prepared with less salt or no salt, if possible.  · If you drink alcohol:  ? Limit how much you use to:  § 0-1 drink a day for women who are not pregnant.  § 0-2 drinks a day for men.  ? Be aware of how much alcohol is in your drink. In the [place] one drink equals one 12 oz bottle of beer (355 mL), one 5 oz glass of wine (148 mL), or one 1½ oz glass of hard liquor (44 mL).  General information  · Avoid eating more than 2,300 mg of salt a day. If you have hypertension, you may need to reduce your sodium intake to 1,500 mg a day.  · Work with your health care provider to maintain a healthy body weight or to lose weight. Ask what an ideal weight is for you.  · Get at least 30 minutes of exercise that causes your heart to beat faster (aerobic exercise) most days of the week. Activities may include walking, swimming, or biking.  · Work with your health care provider or dietitian to adjust your eating plan to your individual calorie needs.  What foods should I eat?  Fruits  All fresh, dried, or frozen fruit. Canned fruit in natural juice (without added sugar).  Vegetables  Fresh or frozen vegetables (raw, steamed, roasted, or grilled). Low-sodium or reduced-sodium tomato and vegetable juice. Low-sodium or reduced-sodium tomato sauce and tomato paste.  Low-sodium or reduced-sodium canned vegetables.  Grains  Whole-grain or whole-wheat bread. Whole-grain or whole-wheat pasta. Brown rice. Oatmeal. Quinoa. Bulgur. Whole-grain and low-sodium cereals. Keira bread. Low-fat, low-sodium crackers. Whole-wheat flour tortillas.  Meats and other proteins  Skinless chicken or turkey. Ground chicken or turkey. Pork with fat trimmed off. Fish and seafood. Egg whites. Dried beans, peas, or lentils. Unsalted nuts, nut butters, and seeds. Unsalted canned beans. Lean cuts of beef with fat trimmed off. Low-sodium, lean precooked or cured meat, such as sausages or meat loaves.  Dairy  Low-fat (1%) or fat-free (skim) milk. Reduced-fat, low-fat, or fat-free cheeses. Nonfat, low-sodium ricotta or cottage cheese. Low-fat or nonfat yogurt. Low-fat, low-sodium cheese.  Fats and oils  Soft margarine without trans fats. Vegetable oil. Reduced-fat, low-fat, or light mayonnaise and salad dressings (reduced-sodium). Canola, safflower, olive, avocado, soybean, and sunflower oils. Avocado.  Seasonings and condiments  Herbs. Spices. Seasoning mixes without salt.  Other foods  Unsalted popcorn and pretzels. Fat-free sweets.  The items listed above may not be a complete list of foods and beverages you can eat. Contact a dietitian for more information.  What foods should I avoid?  Fruits  Canned fruit in a light or heavy syrup. Fried fruit. Fruit in cream or butter sauce.  Vegetables  Creamed or fried vegetables. Vegetables in a cheese sauce. Regular canned vegetables (not low-sodium or reduced-sodium). Regular canned tomato sauce and paste (not low-sodium or reduced-sodium). Regular tomato and vegetable juice (not low-sodium or reduced-sodium). Pickles. Olives.  Grains  Baked goods made with fat, such as croissants, muffins, or some breads. Dry pasta or rice meal packs.  Meats and other proteins  Fatty cuts of meat. Ribs. Fried meat. Martell. Bologna, salami, and other precooked or cured meats, such as  sausages or meat loaves. Fat from the back of a pig (fatback). Bratwurst. Salted nuts and seeds. Canned beans with added salt. Canned or smoked fish. Whole eggs or egg yolks. Chicken or turkey with skin.  Dairy  Whole or 2% milk, cream, and half-and-half. Whole or full-fat cream cheese. Whole-fat or sweetened yogurt. Full-fat cheese. Nondairy creamers. Whipped toppings. Processed cheese and cheese spreads.  Fats and oils  Butter. Stick margarine. Lard. Shortening. Ghee. Martell fat. Tropical oils, such as coconut, palm kernel, or palm oil.  Seasonings and condiments  Onion salt, garlic salt, seasoned salt, table salt, and sea salt. Worcestershire sauce. Tartar sauce. Barbecue sauce. Teriyaki sauce. Soy sauce, including reduced-sodium. Steak sauce. Canned and packaged gravies. Fish sauce. Oyster sauce. Cocktail sauce. Store-bought horseradish. Ketchup. Mustard. Meat flavorings and tenderizers. Bouillon cubes. Hot sauces. Pre-made or packaged marinades. Pre-made or packaged taco seasonings. Relishes. Regular salad dressings.  Other foods  Salted popcorn and pretzels.  The items listed above may not be a complete list of foods and beverages you should avoid. Contact a dietitian for more information.  Where to find more information  · National Heart, Lung, and Blood [place]: www.nhlbi.nih.gov  · American Heart Association: www.heart.org  · Academy of Nutrition and Dietetics: www.eatright.org  · National Kidney Foundation: www.kidney.org  Summary  · The DASH eating plan is a healthy eating plan that has been shown to reduce high blood pressure (hypertension). It may also reduce your risk for type 2 diabetes, heart disease, and stroke.  · When on the DASH eating plan, aim to eat more fresh fruits and vegetables, whole grains, lean proteins, low-fat dairy, and heart-healthy fats.  · With the DASH eating plan, you should limit salt (sodium) intake to 2,300 mg a day. If you have hypertension, you may need to reduce your  sodium intake to 1,500 mg a day.  · Work with your health care provider or dietitian to adjust your eating plan to your individual calorie needs.  This information is not intended to replace advice given to you by your health care provider. Make sure you discuss any questions you have with your health care provider.  Document Revised: 11/20/2020 Document Reviewed: 11/20/2020  [employer] Patient Education © 2021 [employer].
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
## 2021-08-18 ENCOUNTER — OFFICE VISIT (OUTPATIENT)
Dept: PULMONOLOGY | Facility: CLINIC | Age: 66
End: 2021-08-18

## 2021-08-18 VITALS
HEART RATE: 82 BPM | HEIGHT: 62 IN | OXYGEN SATURATION: 99 % | SYSTOLIC BLOOD PRESSURE: 150 MMHG | WEIGHT: 157 LBS | DIASTOLIC BLOOD PRESSURE: 84 MMHG | BODY MASS INDEX: 28.89 KG/M2

## 2021-08-18 DIAGNOSIS — E66.3 OVERWEIGHT: ICD-10-CM

## 2021-08-18 DIAGNOSIS — J45.20 MILD INTERMITTENT ASTHMA, UNSPECIFIED WHETHER COMPLICATED: Primary | ICD-10-CM

## 2021-08-18 DIAGNOSIS — G47.33 OSA ON CPAP: ICD-10-CM

## 2021-08-18 DIAGNOSIS — Z99.89 OSA ON CPAP: ICD-10-CM

## 2021-08-18 PROCEDURE — 99214 OFFICE O/P EST MOD 30 MIN: CPT | Performed by: INTERNAL MEDICINE

## 2021-08-18 NOTE — ASSESSMENT & PLAN NOTE
She will continue her albuterol sulfate as needed and her Advair /21 as her maintenance therapy.
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
## 2021-08-18 NOTE — ASSESSMENT & PLAN NOTE
Given her AHI was 13.2 on her most recent compliance study but some of this could certainly been affected by her recent back injury and subsequent need for any narcotic analgesics in the short-term.  She is on no such medications at present.

## 2021-08-18 NOTE — ASSESSMENT & PLAN NOTE
Diet and exercise are encouraged although her exercise capabilities will be limited for the short-term because her recent back injury.  She will follow up with her primary care physician regarding her elevated BMI otherwise.
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
## 2021-09-07 ENCOUNTER — TELEPHONE (OUTPATIENT)
Dept: NEUROSURGERY | Facility: CLINIC | Age: 66
End: 2021-09-07

## 2021-09-08 ENCOUNTER — OFFICE VISIT (OUTPATIENT)
Dept: NEUROSURGERY | Facility: CLINIC | Age: 66
End: 2021-09-08

## 2021-09-08 ENCOUNTER — HOSPITAL ENCOUNTER (OUTPATIENT)
Dept: GENERAL RADIOLOGY | Facility: HOSPITAL | Age: 66
Discharge: HOME | End: 2021-09-08
Admitting: NURSE PRACTITIONER

## 2021-09-08 DIAGNOSIS — E66.3 OVERWEIGHT (BMI 25.0-29.9): ICD-10-CM

## 2021-09-08 DIAGNOSIS — S22.080A COMPRESSION FRACTURE OF T12 VERTEBRA, INITIAL ENCOUNTER (HCC): ICD-10-CM

## 2021-09-08 DIAGNOSIS — S32.010A COMPRESSION FRACTURE OF L1 VERTEBRA, INITIAL ENCOUNTER (HCC): ICD-10-CM

## 2021-09-08 DIAGNOSIS — S22.080A COMPRESSION FRACTURE OF T12 VERTEBRA, INITIAL ENCOUNTER (HCC): Primary | ICD-10-CM

## 2021-09-08 DIAGNOSIS — S32.040B: ICD-10-CM

## 2021-09-08 PROCEDURE — 99214 OFFICE O/P EST MOD 30 MIN: CPT | Performed by: NURSE PRACTITIONER

## 2021-09-08 PROCEDURE — 72100 X-RAY EXAM L-S SPINE 2/3 VWS: CPT

## 2021-09-08 NOTE — PATIENT INSTRUCTIONS
"https://www.nhlbi.nih.gov/files/docs/public/heart/dash_brief.pdf\">   DASH Eating Plan  DASH stands for Dietary Approaches to Stop Hypertension. The DASH eating plan is a healthy eating plan that has been shown to:  · Reduce high blood pressure (hypertension).  · Reduce your risk for type 2 diabetes, heart disease, and stroke.  · Help with weight loss.  What are tips for following this plan?  Reading food labels  · Check food labels for the amount of salt (sodium) per serving. Choose foods with less than 5 percent of the Daily Value of sodium. Generally, foods with less than 300 milligrams (mg) of sodium per serving fit into this eating plan.  · To find whole grains, look for the word \"whole\" as the first word in the ingredient list.  Shopping  · Buy products labeled as \"low-sodium\" or \"no salt added.\"  · Buy fresh foods. Avoid canned foods and pre-made or frozen meals.  Cooking  · Avoid adding salt when cooking. Use salt-free seasonings or herbs instead of table salt or sea salt. Check with your health care provider or pharmacist before using salt substitutes.  · Do not dwyer foods. Cook foods using healthy methods such as baking, boiling, grilling, roasting, and broiling instead.  · Cook with heart-healthy oils, such as olive, canola, avocado, soybean, or sunflower oil.  Meal planning    · Eat a balanced diet that includes:  ? 4 or more servings of fruits and 4 or more servings of vegetables each day. Try to fill one-half of your plate with fruits and vegetables.  ? 6-8 servings of whole grains each day.  ? Less than 6 oz (170 g) of lean meat, poultry, or fish each day. A 3-oz (85-g) serving of meat is about the same size as a deck of cards. One egg equals 1 oz (28 g).  ? 2-3 servings of low-fat dairy each day. One serving is 1 cup (237 mL).  ? 1 serving of nuts, seeds, or beans 5 times each week.  ? 2-3 servings of heart-healthy fats. Healthy fats called omega-3 fatty acids are found in foods such as walnuts, " flaxseeds, fortified milks, and eggs. These fats are also found in cold-water fish, such as sardines, salmon, and mackerel.  · Limit how much you eat of:  ? Canned or prepackaged foods.  ? Food that is high in trans fat, such as some fried foods.  ? Food that is high in saturated fat, such as fatty meat.  ? Desserts and other sweets, sugary drinks, and other foods with added sugar.  ? Full-fat dairy products.  · Do not salt foods before eating.  · Do not eat more than 4 egg yolks a week.  · Try to eat at least 2 vegetarian meals a week.  · Eat more home-cooked food and less restaurant, buffet, and fast food.    Lifestyle  · When eating at a restaurant, ask that your food be prepared with less salt or no salt, if possible.  · If you drink alcohol:  ? Limit how much you use to:  § 0-1 drink a day for women who are not pregnant.  § 0-2 drinks a day for men.  ? Be aware of how much alcohol is in your drink. In the [place] one drink equals one 12 oz bottle of beer (355 mL), one 5 oz glass of wine (148 mL), or one 1½ oz glass of hard liquor (44 mL).  General information  · Avoid eating more than 2,300 mg of salt a day. If you have hypertension, you may need to reduce your sodium intake to 1,500 mg a day.  · Work with your health care provider to maintain a healthy body weight or to lose weight. Ask what an ideal weight is for you.  · Get at least 30 minutes of exercise that causes your heart to beat faster (aerobic exercise) most days of the week. Activities may include walking, swimming, or biking.  · Work with your health care provider or dietitian to adjust your eating plan to your individual calorie needs.  What foods should I eat?  Fruits  All fresh, dried, or frozen fruit. Canned fruit in natural juice (without added sugar).  Vegetables  Fresh or frozen vegetables (raw, steamed, roasted, or grilled). Low-sodium or reduced-sodium tomato and vegetable juice. Low-sodium or reduced-sodium tomato sauce and tomato paste.  Low-sodium or reduced-sodium canned vegetables.  Grains  Whole-grain or whole-wheat bread. Whole-grain or whole-wheat pasta. Brown rice. Oatmeal. Quinoa. Bulgur. Whole-grain and low-sodium cereals. Keira bread. Low-fat, low-sodium crackers. Whole-wheat flour tortillas.  Meats and other proteins  Skinless chicken or turkey. Ground chicken or turkey. Pork with fat trimmed off. Fish and seafood. Egg whites. Dried beans, peas, or lentils. Unsalted nuts, nut butters, and seeds. Unsalted canned beans. Lean cuts of beef with fat trimmed off. Low-sodium, lean precooked or cured meat, such as sausages or meat loaves.  Dairy  Low-fat (1%) or fat-free (skim) milk. Reduced-fat, low-fat, or fat-free cheeses. Nonfat, low-sodium ricotta or cottage cheese. Low-fat or nonfat yogurt. Low-fat, low-sodium cheese.  Fats and oils  Soft margarine without trans fats. Vegetable oil. Reduced-fat, low-fat, or light mayonnaise and salad dressings (reduced-sodium). Canola, safflower, olive, avocado, soybean, and sunflower oils. Avocado.  Seasonings and condiments  Herbs. Spices. Seasoning mixes without salt.  Other foods  Unsalted popcorn and pretzels. Fat-free sweets.  The items listed above may not be a complete list of foods and beverages you can eat. Contact a dietitian for more information.  What foods should I avoid?  Fruits  Canned fruit in a light or heavy syrup. Fried fruit. Fruit in cream or butter sauce.  Vegetables  Creamed or fried vegetables. Vegetables in a cheese sauce. Regular canned vegetables (not low-sodium or reduced-sodium). Regular canned tomato sauce and paste (not low-sodium or reduced-sodium). Regular tomato and vegetable juice (not low-sodium or reduced-sodium). Pickles. Olives.  Grains  Baked goods made with fat, such as croissants, muffins, or some breads. Dry pasta or rice meal packs.  Meats and other proteins  Fatty cuts of meat. Ribs. Fried meat. Martell. Bologna, salami, and other precooked or cured meats, such as  sausages or meat loaves. Fat from the back of a pig (fatback). Bratwurst. Salted nuts and seeds. Canned beans with added salt. Canned or smoked fish. Whole eggs or egg yolks. Chicken or turkey with skin.  Dairy  Whole or 2% milk, cream, and half-and-half. Whole or full-fat cream cheese. Whole-fat or sweetened yogurt. Full-fat cheese. Nondairy creamers. Whipped toppings. Processed cheese and cheese spreads.  Fats and oils  Butter. Stick margarine. Lard. Shortening. Ghee. Martell fat. Tropical oils, such as coconut, palm kernel, or palm oil.  Seasonings and condiments  Onion salt, garlic salt, seasoned salt, table salt, and sea salt. Worcestershire sauce. Tartar sauce. Barbecue sauce. Teriyaki sauce. Soy sauce, including reduced-sodium. Steak sauce. Canned and packaged gravies. Fish sauce. Oyster sauce. Cocktail sauce. Store-bought horseradish. Ketchup. Mustard. Meat flavorings and tenderizers. Bouillon cubes. Hot sauces. Pre-made or packaged marinades. Pre-made or packaged taco seasonings. Relishes. Regular salad dressings.  Other foods  Salted popcorn and pretzels.  The items listed above may not be a complete list of foods and beverages you should avoid. Contact a dietitian for more information.  Where to find more information  · National Heart, Lung, and Blood [place]: www.nhlbi.nih.gov  · American Heart Association: www.heart.org  · Academy of Nutrition and Dietetics: www.eatright.org  · National Kidney Foundation: www.kidney.org  Summary  · The DASH eating plan is a healthy eating plan that has been shown to reduce high blood pressure (hypertension). It may also reduce your risk for type 2 diabetes, heart disease, and stroke.  · When on the DASH eating plan, aim to eat more fresh fruits and vegetables, whole grains, lean proteins, low-fat dairy, and heart-healthy fats.  · With the DASH eating plan, you should limit salt (sodium) intake to 2,300 mg a day. If you have hypertension, you may need to reduce your  sodium intake to 1,500 mg a day.  · Work with your health care provider or dietitian to adjust your eating plan to your individual calorie needs.  This information is not intended to replace advice given to you by your health care provider. Make sure you discuss any questions you have with your health care provider.  Document Revised: 11/20/2020 Document Reviewed: 11/20/2020  [employer] Patient Education © 2021 [employer]
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
## 2021-09-10 VITALS — BODY MASS INDEX: 29.04 KG/M2 | WEIGHT: 157.8 LBS | HEIGHT: 62 IN

## 2021-09-22 ENCOUNTER — TELEPHONE (OUTPATIENT)
Dept: PHYSICAL THERAPY | Facility: CLINIC | Age: 66
End: 2021-09-22

## 2021-09-29 ENCOUNTER — TREATMENT (OUTPATIENT)
Dept: PHYSICAL THERAPY | Facility: CLINIC | Age: 66
End: 2021-09-29

## 2021-09-29 DIAGNOSIS — Z74.09 IMPAIRED MOBILITY: ICD-10-CM

## 2021-09-29 DIAGNOSIS — S22.080D COMPRESSION FRACTURE OF T12 VERTEBRA WITH ROUTINE HEALING, SUBSEQUENT ENCOUNTER: Primary | ICD-10-CM

## 2021-09-29 DIAGNOSIS — S32.010D COMPRESSION FRACTURE OF L1 VERTEBRA WITH ROUTINE HEALING, SUBSEQUENT ENCOUNTER: ICD-10-CM

## 2021-09-29 DIAGNOSIS — S32.040A COMPRESSION FRACTURE OF L4 LUMBAR VERTEBRA, CLOSED, INITIAL ENCOUNTER (HCC): ICD-10-CM

## 2021-09-29 PROCEDURE — 97161 PT EVAL LOW COMPLEX 20 MIN: CPT | Performed by: PHYSICAL THERAPIST
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
## 2021-10-06 ENCOUNTER — TELEPHONE (OUTPATIENT)
Dept: NEUROSURGERY | Facility: CLINIC | Age: 66
End: 2021-10-06

## 2021-10-06 ENCOUNTER — TREATMENT (OUTPATIENT)
Dept: PHYSICAL THERAPY | Facility: CLINIC | Age: 66
End: 2021-10-06

## 2021-10-06 DIAGNOSIS — S32.010D COMPRESSION FRACTURE OF L1 VERTEBRA WITH ROUTINE HEALING, SUBSEQUENT ENCOUNTER: ICD-10-CM

## 2021-10-06 DIAGNOSIS — S32.040A COMPRESSION FRACTURE OF L4 LUMBAR VERTEBRA, CLOSED, INITIAL ENCOUNTER (HCC): ICD-10-CM

## 2021-10-06 DIAGNOSIS — S22.080D COMPRESSION FRACTURE OF T12 VERTEBRA WITH ROUTINE HEALING, SUBSEQUENT ENCOUNTER: Primary | ICD-10-CM

## 2021-10-06 DIAGNOSIS — Z74.09 IMPAIRED MOBILITY: ICD-10-CM

## 2021-10-06 PROCEDURE — 97110 THERAPEUTIC EXERCISES: CPT | Performed by: PHYSICAL THERAPIST

## 2021-10-06 PROCEDURE — 97140 MANUAL THERAPY 1/> REGIONS: CPT | Performed by: PHYSICAL THERAPIST

## 2021-10-06 NOTE — TELEPHONE ENCOUNTER
OCTAVIO  - GAVE MS. ELAM THE BILLING OFFICE PHONE NUMBER - [phone].   BILLING IS HANDLED OUT OF [employer].

## 2021-10-06 NOTE — PROGRESS NOTES
"Physical Therapy Treatment Note    Patient: Salud Daley                                                                                     Visit Date: 10/6/2021  :     1955    Referring practitioner:    VIN Fleming  Date of Initial Visit:          Type: THERAPY  Noted: 2021    Patient seen for 2 sessions    Visit Diagnoses:    ICD-10-CM ICD-9-CM   1. Compression fracture of T12 vertebra with routine healing, subsequent encounter  S22.080D V54.17   2. Compression fracture of L1 vertebra with routine healing, subsequent encounter  S32.010D V54.17   3. Compression fracture of L4 lumbar vertebra, closed, initial encounter ([facility])  S32.040A 805.4   4. Impaired mobility  Z74.09 799.89     SUBJECTIVE     Subjective She has no pain today and feels a lot better after her evaluation.     PAIN: 0/10 > 1/10 (felt looser and overall reported feeling better)     OBJECTIVE     Objective      Manual Therapy     20287  Comments   Very gentle STM with massage cream, prone Thoracolumbar paraspinals, light pressure, TTP (yadira on R)   IASTM with BL ridge roller, prone VERY GENTLE over thoracolumbar paraspinals, gluteals       Timed Minutes 17     Therapeutic Exercises    00008 Comments   B UE phasic against YTB 2 x 10 -- added to HEP   Standing rows against YTB  2 x 10 -- added to HEP   HL horizontal abduction against YTB  2 x 10    HL \"I's\" against YTB 2 x 10    B SL hip abduction SLR (YTB on L) 2 x 10 ea   B SL clamshells against YTB  2 x 10 ea   B hip abduction MMT R: 4+/5 and L: 4+/5   B glut max MMT (L) 4+/5 and (R) 4/5   Prone hip ext with 1.5 lb ankle wt bilaterally  2 x 10 ea   Ball presses with 45 cm physioball, seated 2 x 10        Timed Minutes 40     Therapy Education/Self Care 05478   Details: See below   Given [employer] HEP (access code [email or web address])  symptom relief   Progress: Reinforced   Education provided to:  Patient   Level of understanding Verbalized   Timed Minutes      Access Code: " [email or web address]  Date: 10/06/2021  Prepared by: Marce Sutton    Exercises  Hooklying Single Leg Bent Knee Fallouts with Resistance - 2 x daily - 7 x weekly - 2 sets - 10 reps  Supine shoulder abduction with resistance - 2 x daily - 7 x weekly - 2 sets - 10 reps  Supine Posterior Pelvic Tilt - 2 x daily - 7 x weekly - 2 sets - 10 reps  Standing Shoulder Row with Anchored Resistance - 1-2 x daily - 7 x weekly - 2 sets - 10 reps  B UE Phasic with Resistance - 1-2 x daily - 7 x weekly - 2 sets - 10 reps    Total Timed Treatment:     57   mins  Total Time of Visit:             57  mins         ASSESSMENT/PLAN     GOALS  Goals                                          Progress Note due by 10/29/21                                                      Recert due by 12/29/21   LTG by: 6 weeks Comments Date Status   Pt demonstrates independence with HEP. Bolstered today, provided written handout 10/6/21 ongoing   Pt demonstrates decreased sensitivity to touch upon palpation in the thoracic and lumbar musculature.  Very TTP today, especially on R lumbar paraspinals, no pain otherwise, reports good results following initial eval (last session)  10/6/21 ongoing   Pt demonstrates B hip abductor strength of 4+/5. (R) 4+/5 and (L) 4+/5 10/6/21 met   Pt demonstrates B glute max strength of 4+/5. 9/29/21: (L) 3+/5 and (R) 4-/5  10/6/21: (L) 4+/5 and (R) 4/5 9/29/21 Partially met  ongoing   Pt demonstrates B scapular strength of 4/5. 9/29/21:   (R) Lower trap: 3-/5 and Middle trap: 3/5  (L) Lower trap: 3-/5 and Middle trap: 3/5 9/29/21 ongoing     Assessment/Plan     ASSESSMENT: Today was her first tx session since her initial evaluation; however, she met one and partially met one of her five goals. She was very TTP throughout thoracolumbar spine, but especially throughout R lumbar paraspinals. While we did have some success with desensitization, she appeared to respond best to more active approach.     PLAN: Assess long term reaction to tx  today and if tolerated well, continue with more active approach. Reinforce HEP bolstered this date and consider focusing on core and postural strengthening as tolerated.     Signature: Marce Sutton, PTA
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
## 2021-10-13 ENCOUNTER — TREATMENT (OUTPATIENT)
Dept: PHYSICAL THERAPY | Facility: CLINIC | Age: 66
End: 2021-10-13

## 2021-10-13 DIAGNOSIS — Z74.09 IMPAIRED MOBILITY: ICD-10-CM

## 2021-10-13 DIAGNOSIS — S32.010D COMPRESSION FRACTURE OF L1 VERTEBRA WITH ROUTINE HEALING, SUBSEQUENT ENCOUNTER: ICD-10-CM

## 2021-10-13 DIAGNOSIS — S22.080D COMPRESSION FRACTURE OF T12 VERTEBRA WITH ROUTINE HEALING, SUBSEQUENT ENCOUNTER: Primary | ICD-10-CM

## 2021-10-13 DIAGNOSIS — S32.040A COMPRESSION FRACTURE OF L4 LUMBAR VERTEBRA, CLOSED, INITIAL ENCOUNTER (HCC): ICD-10-CM

## 2021-10-13 PROCEDURE — 97110 THERAPEUTIC EXERCISES: CPT | Performed by: PHYSICAL THERAPIST

## 2021-10-13 NOTE — PROGRESS NOTES
"Physical Therapy Treatment Note    Patient: Salud Daley                                                                                     Visit Date: 10/13/2021  :     1955    Referring practitioner:    VIN Flemnig  Date of Initial Visit:          Type: THERAPY  Noted: 2021    Patient seen for 3 sessions    Visit Diagnoses:    ICD-10-CM ICD-9-CM   1. Compression fracture of T12 vertebra with routine healing, subsequent encounter  S22.080D V54.17   2. Compression fracture of L1 vertebra with routine healing, subsequent encounter  S32.010D V54.17   3. Compression fracture of L4 lumbar vertebra, closed, initial encounter ([facility])  S32.040A 805.4   4. Impaired mobility  Z74.09 799.89     SUBJECTIVE     Subjective She has no pain today and denies pain following last session. She reports doing well with HEP and has her  performing them as well.     PAIN: 0/10 > 1-2/10 (reports fatigue > pain)     OBJECTIVE     Objective      Therapeutic Exercises    73099 Comments   B UE phasic against RTB 2 x 10    Standing rows against RTB  2 x 10 - cues to d/c shrugging / swayback   HL horizontal abduction against RTB  2 x 10    HL \"I's\" against RTB 2 x 10    B SL hip abduction SLR with RTB  2 x 10 ea   B alternating BKFOs against RTB 2 x 10 ea   B SL clamshells against RTB 2 x 10 ea   Supine SLR with RTB  2 x 10 ea   Quadruped praying mantis with 45 cm physioball 2 x 10    Quadruped B alternating bird dogs over 45 cm SB 2 x 10 ea   Ball presses with 45 cm physioball 2 x 10, 2-3 sec hold   B oblique ball presses with 45 cm physioball 2 x 10 ea   Static hold with 4 lb DB at 90 deg, standing  2 x 30 sec   Supine over 55 lb physioball pec flies 2 x 10        Timed Minutes 54     Therapy Education/Self Care 63528   Details: Progressed resistance, rolling ball gently over back to help with desensitization   Given Home Exercise Program  [email or web address] HEP (access code [email or web address])  postural retraining  symptom relief "   Progress: Reinforced and Progressed   Education provided to:  Patient   Level of understanding Verbalized and Demonstrated   Timed Minutes      Access Code: [email or web address]  Date: 10/06/2021  Prepared by: Marce Sutton    Exercises  Hooklying Single Leg Bent Knee Fallouts with Resistance - 2 x daily - 7 x weekly - 2 sets - 10 reps  Supine shoulder abduction with resistance - 2 x daily - 7 x weekly - 2 sets - 10 reps  Supine Posterior Pelvic Tilt - 2 x daily - 7 x weekly - 2 sets - 10 reps  Standing Shoulder Row with Anchored Resistance - 1-2 x daily - 7 x weekly - 2 sets - 10 reps  B UE Phasic with Resistance - 1-2 x daily - 7 x weekly - 2 sets - 10 reps    Total Timed Treatment:     54   mins  Total Time of Visit:             59  mins         ASSESSMENT/PLAN     GOALS  Goals                                    Progress Note due by 10/29/21                                                      Recert due by 12/29/21   LTG by: 6 weeks Comments Date Status   Pt demonstrates independence with HEP. Reinforced today and progressed resistance, bolstered today 10/13/21 ongoing   Pt demonstrates decreased sensitivity to touch upon palpation in the thoracic and lumbar musculature.  Very TTP today, especially on R lumbar paraspinals, no pain otherwise, reports good results following initial eval (last session)  10/6/21 ongoing   Pt demonstrates B hip abductor strength of 4+/5. (R) 4+/5 and (L) 4+/5 10/6/21 met   Pt demonstrates B glute max strength of 4+/5. 9/29/21: (L) 3+/5 and (R) 4-/5  10/6/21: (L) 4+/5 and (R) 4/5 9/29/21 Partially met  ongoing   Pt demonstrates B scapular strength of 4/5. 9/29/21:   (R) Lower trap: 3-/5 and Middle trap: 3/5  (L) Lower trap: 3-/5 and Middle trap: 3/5 9/29/21 ongoing     Assessment/Plan     ASSESSMENT: Patient responded very well to active approach last session reporting no pain following or since, so continued today. Progressed resistance today with exercises and was able to progress several  exercises she had difficulty with last session. She is progressing well but does require cues to prevent B UT activation and swayback.     PLAN: Consider focusing on core and postural strengthening, assess postural MMT.     Signature: Marce Sutton PTA

## 2021-10-20 ENCOUNTER — TREATMENT (OUTPATIENT)
Dept: PHYSICAL THERAPY | Facility: CLINIC | Age: 66
End: 2021-10-20

## 2021-10-20 DIAGNOSIS — S22.080D COMPRESSION FRACTURE OF T12 VERTEBRA WITH ROUTINE HEALING, SUBSEQUENT ENCOUNTER: Primary | ICD-10-CM

## 2021-10-20 DIAGNOSIS — Z74.09 IMPAIRED MOBILITY: ICD-10-CM

## 2021-10-20 DIAGNOSIS — S32.010D COMPRESSION FRACTURE OF L1 VERTEBRA WITH ROUTINE HEALING, SUBSEQUENT ENCOUNTER: ICD-10-CM

## 2021-10-20 DIAGNOSIS — S32.040A COMPRESSION FRACTURE OF L4 LUMBAR VERTEBRA, CLOSED, INITIAL ENCOUNTER (HCC): ICD-10-CM

## 2021-10-20 PROCEDURE — 97110 THERAPEUTIC EXERCISES: CPT | Performed by: PHYSICAL THERAPIST
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
## 2021-10-27 ENCOUNTER — TREATMENT (OUTPATIENT)
Dept: PHYSICAL THERAPY | Facility: CLINIC | Age: 66
End: 2021-10-27

## 2021-10-27 DIAGNOSIS — S22.080D COMPRESSION FRACTURE OF T12 VERTEBRA WITH ROUTINE HEALING, SUBSEQUENT ENCOUNTER: Primary | ICD-10-CM

## 2021-10-27 DIAGNOSIS — S32.040A COMPRESSION FRACTURE OF L4 LUMBAR VERTEBRA, CLOSED, INITIAL ENCOUNTER (HCC): ICD-10-CM

## 2021-10-27 DIAGNOSIS — Z74.09 IMPAIRED MOBILITY: ICD-10-CM

## 2021-10-27 DIAGNOSIS — S32.010D COMPRESSION FRACTURE OF L1 VERTEBRA WITH ROUTINE HEALING, SUBSEQUENT ENCOUNTER: ICD-10-CM

## 2021-10-27 PROCEDURE — 97110 THERAPEUTIC EXERCISES: CPT | Performed by: PHYSICAL THERAPIST

## 2021-10-27 NOTE — PROGRESS NOTES
"Physical Therapy Treatment Note and Discharge Note    Patient: Salud Daley                                                                                     Visit Date: 10/27/2021  :     1955    Referring practitioner:    VIN Fleming  Date of Initial Visit:          Type: THERAPY  Noted: 2021    Patient seen for 5 sessions    Visit Diagnoses:    ICD-10-CM ICD-9-CM   1. Compression fracture of T12 vertebra with routine healing, subsequent encounter  S22.080D V54.17   2. Compression fracture of L1 vertebra with routine healing, subsequent encounter  S32.010D V54.17   3. Compression fracture of L4 lumbar vertebra, closed, initial encounter (Formerly [facility])  S32.040A 805.4   4. Impaired mobility  Z74.09 799.89     SUBJECTIVE     Subjective \"I can feel my back getting stronger. I've been working on my posture.\" She has noticed posture in others and is trying to sit with PPT. She reports less pain while sleeping and is able to sleep through the night in her bed.    PAIN: 0/10 > 0/10     OBJECTIVE     Objective      Therapeutic Exercises    [id number] Comments   B SL clamshells against GTB 2 x 10 ea -- progressed to GTB   PPT in HL 2 x 10    Seated marches on 65 cm physioball maintaining PPT 2 x 10 ea   HL horizontal abduction with GTB 2 x 10   B unilateral BKFOs with GTB 2 x 10 ea -- progressed from RTB   Standing rows at treadmill with GTB 2 x 10    B UE phasic against GTB 2 x 10    LAQ seated on 65 cm physioball maintaining PPT 2 x 10 ea -- cues for PPT, posture   B lateral stepping against red Asif 2 x 10 ea   Retro unilateral stepping against red Asif 2 x 10    Monster walks against red Asif 2 x 10 ea   Forward marching high steps against RIP  2 x 10 ea   Retro walking against RIP training 1 x 30 feet       Timed Minutes 40     Therapy Education/Self Care [id number]   Details: Reviewed and reinforced comprehensive HEP, progressed   Given Home Exercise Program  [employer] HEP (access code " [email or web address])  postural retraining  symptom relief   Progress: Reinforced and Progressed   Education provided to:  Patient   Level of understanding Verbalized and Demonstrated   Timed Minutes      Access Code: [email or web address]  Date: 10/06/2021  Prepared by: Marce Sutton    Exercises  Hooklying Single Leg Bent Knee Fallouts with Resistance - 2 x daily - 7 x weekly - 2 sets - 10 reps  Supine shoulder abduction with resistance - 2 x daily - 7 x weekly - 2 sets - 10 reps  Supine Posterior Pelvic Tilt - 2 x daily - 7 x weekly - 2 sets - 10 reps  Standing Shoulder Row with Anchored Resistance - 1-2 x daily - 7 x weekly - 2 sets - 10 reps  B UE Phasic with Resistance - 1-2 x daily - 7 x weekly - 2 sets - 10 reps    Total Timed Treatment:     40   mins  Total Time of Visit:             40  mins         ASSESSMENT/PLAN     GOALS  Goals                                    Progress Note due by 10/29/21                                                       Recert due by 12/29/21   LTG by: 6 weeks Comments Date Status   Pt demonstrates independence with HEP. She is compliant every other day with HEP. Progressed resistance from mod  10/27/21 met   Pt demonstrates decreased sensitivity to touch upon palpation in the thoracic and lumbar musculature.  Tolerated min-mod touch today without 10/27/21 Partially met   Pt demonstrates B hip abductor strength of 4+/5. (R) 4+/5 and (L) 4+/5 10/6/21 met   Pt demonstrates B glute max strength of 4+/5. 9/29/21: (L) 3+/5 and (R) 4-/5  10/6/21: (L) 4+/5 and (R) 4/5 10/6/21 Partially met     Pt demonstrates B scapular strength of 4/5. 9/29/21:   (R) Lower trap: 3-/5 and Middle trap: 3/5  (L) Lower trap: 3-/5 and Middle trap: 3/5  10/20/21:  Lower trap: 3-/5 ea, Mid trap: 4+/5 ea 10/20/21 Partially met       Assessment/Plan     ASSESSMENT: Since beginning PT, patient has demonstrated significant improvements, meeting two and partially meeting three of her five goals. She is now able to sleep through the  night in her bed with minimal pain reported. She reports improved postural awareness and pelvic alignment, demonstrating this today as well. Reviewed and reinforced her comprehensive HEP and was able to progress resistance from moderate to heavy for many of her exercises. Overall, she has progressed well and feels confident to continue with comprehensive HEP independently and return to regular exercise class.    PLAN: D/C home with comprehensive HEP.    DISCHARGE SUMMARY   Discharge date 10/27/2021   Dates of this episode 9/29/2021 through 10/27/2021   Number of visits on this episode 5   Reason for discharge at baseline functional level  patient/caregiver request   Outcomes achieved Able to achieve some goals and partially achieve other goals   Discharge plan Continue with current home exercise program as instructed   Summary of care See above   Discharge instruction See above     Signature: Marce Sutton, PTA

## 2021-10-27 NOTE — PROGRESS NOTES
I have reviewed the notes, assessments, and/or procedures performed by Marce Sutton PTA, I concur with her/his documentation of Salud Daley.

## 2021-11-09 ENCOUNTER — TELEPHONE (OUTPATIENT)
Dept: PULMONOLOGY | Facility: CLINIC | Age: 66
End: 2021-11-09

## 2021-11-11 NOTE — PROGRESS NOTES
"Chief Complaint  Sleep Apnea (no new scans; pft done today) and Mild persistent asthma without complication    Subjective    History of Present Illness     Salud Daley presents to [facility] PULMONARY & CRITICAL CARE MEDICINE   Ms. Daley is a pleasant 65-year-old female with known mild intermittent asthma and cough.  She also has known obstructive sleep apnea and follows Rudy Kruger PA-C. She has continued on her Advair and reports no exacerbations at this time. Last use of rescue inhaler was very long time.  She takes generic zyrtec for allergies. She is here today for a FVL which has shown restriction with no significant change compared to Feb 2020. She is planning to get the Moderna Booster when she can. She is being compliant with her CPAP but does state despite the moisture added she has cotton mouth in the am.            Objective   Vital Signs:   /72   Pulse 70   Ht 157.5 cm (62\")   Wt 75.8 kg (167 lb)   SpO2 96%   BMI 30.54 kg/m²     Physical Exam  Vitals reviewed.   Constitutional:       Appearance: Normal appearance.   Cardiovascular:      Rate and Rhythm: Normal rate and regular rhythm.   Pulmonary:      Effort: Pulmonary effort is normal.      Breath sounds: Normal breath sounds.   Neurological:      General: No focal deficit present.      Mental Status: She is alert and oriented to person, place, and time.   Psychiatric:         Mood and Affect: Mood normal.         Behavior: Behavior normal.          Result Review :  The following data was reviewed by: VIN Ross on 11/17/2021:    My interpretation of imaging:  No new   My interpretation of labs: No new     PFT Values        Some values may be hidden. Unless noted otherwise, only the newest values recorded on each date are displayed.         Old Values PFT Results 2/12/20 11/17/21   No data to display.      Pre Drug PFT Results 2/12/20 11/17/21   FVC 74 77.81   FEV1 68 73.65   FEF 25-75% 49 58 "   FEV1/FVC 74.04 76      Post Drug PFT Results 20   No data to display.      Other Tests PFT Results 20   TLC 92        DLCO 114    D/VAsb 143            My interpretation of the PFT: as below    Results for orders placed in visit on 21    Pulmonary Function Test    Narrative  Pulmonary Function Test  Performed by: Alyx Sanford APRN  Authorized by: Alyx Sanford APRN    Pre Drug % Predicted  FVC: 77.81%  FEV1: 73.65%  FEF 25-75%: 58%  FEV1/FVC: 76%    Interpretation  Spirometry  Spirometry shows moderate restriction. There is reduced midflow suggesting small airway/airflow obstruction.  Review of FVL curve  Patient's effort is normal.      Results for orders placed in visit on 20    Pulmonary Function Test    Narrative  Pulmonary Function Test  Performed by: Alyx Sanford APRN  Authorized by: Alyx Sanford APRN    Pre Drug  FVC: 74%  FEV1: 68%  FEF 25-75%: 49%  FEV1/FVC: 74.04%  T%  RV: 112%  DLCO: 114%  D/VAsb: 143%      Patient's BMI 30.54. BMI is above normal parameters. Recommendations include: referral to primary care.    Assessment and Plan   Diagnoses and all orders for this visit:    1. Mild intermittent asthma, unspecified whether complicated (Primary)  -     Pulmonary Function Test  -     Spirometry Without Bronchodilator    2. Encounter for preoperative screening laboratory testing for COVID-19 virus    Other orders  -     albuterol sulfate  (90 Base) MCG/ACT inhaler; Inhale 2 puffs Every 6 (Six) Hours As Needed for Wheezing.  Dispense: 18 g; Refill: 3  -     Advair -21 MCG/ACT inhaler; Inhale 2 puffs 2 (Two) Times a Day.  Dispense: 1 each; Refill: 3      Continue and refill current medications. Continue to use CPAP and recommended trying Biotine over the counter for dry mouth. Keep follow up with Neuro for CPAP.     Health maintenance:   Influenza vaccine: Oct 2020 will get today in office   Pneumovax  23: Nov 2014  Covid-19 Moderna March/ April 2021 -will check with pharmacy for booster or with PCP     Follow Up   Return in about 6 months (around 5/17/2022).  Patient was given instructions and counseling regarding her condition or for health maintenance advice. Please see specific information pulled into the AVS if appropriate.     Alyx Sanford, APRN  11/17/2021  09:51 CST

## 2021-11-17 ENCOUNTER — OFFICE VISIT (OUTPATIENT)
Dept: PULMONOLOGY | Facility: CLINIC | Age: 66
End: 2021-11-17

## 2021-11-17 VITALS
WEIGHT: 167 LBS | SYSTOLIC BLOOD PRESSURE: 134 MMHG | HEART RATE: 70 BPM | DIASTOLIC BLOOD PRESSURE: 72 MMHG | HEIGHT: 62 IN | OXYGEN SATURATION: 96 % | BODY MASS INDEX: 30.73 KG/M2

## 2021-11-17 DIAGNOSIS — J45.20 MILD INTERMITTENT ASTHMA, UNSPECIFIED WHETHER COMPLICATED: Primary | ICD-10-CM

## 2021-11-17 DIAGNOSIS — Z20.822 ENCOUNTER FOR PREOPERATIVE SCREENING LABORATORY TESTING FOR COVID-19 VIRUS: ICD-10-CM

## 2021-11-17 DIAGNOSIS — Z23 NEED FOR INFLUENZA VACCINATION: ICD-10-CM

## 2021-11-17 DIAGNOSIS — Z01.812 ENCOUNTER FOR PREOPERATIVE SCREENING LABORATORY TESTING FOR COVID-19 VIRUS: ICD-10-CM

## 2021-11-17 PROCEDURE — G0008 ADMIN INFLUENZA VIRUS VAC: HCPCS | Performed by: NURSE PRACTITIONER

## 2021-11-17 PROCEDURE — 90662 IIV NO PRSV INCREASED AG IM: CPT | Performed by: NURSE PRACTITIONER

## 2021-11-17 PROCEDURE — 99214 OFFICE O/P EST MOD 30 MIN: CPT | Performed by: NURSE PRACTITIONER

## 2021-11-17 PROCEDURE — 94010 BREATHING CAPACITY TEST: CPT | Performed by: NURSE PRACTITIONER

## 2021-11-17 RX ORDER — ALBUTEROL SULFATE 90 UG/1
2 AEROSOL, METERED RESPIRATORY (INHALATION) EVERY 6 HOURS PRN
Qty: 18 G | Refills: 3 | Status: SHIPPED | OUTPATIENT
Start: 2021-11-17

## 2021-11-17 RX ORDER — FLUTICASONE PROPIONATE AND SALMETEROL XINAFOATE 115; 21 UG/1; UG/1
2 AEROSOL, METERED RESPIRATORY (INHALATION) 2 TIMES DAILY
Qty: 1 EACH | Refills: 3 | Status: SHIPPED | OUTPATIENT
Start: 2021-11-17 | End: 2022-07-09

## 2021-11-17 NOTE — PROCEDURES
Pulmonary Function Test  Performed by: Alyx Sanford APRN  Authorized by: Alyx Sanford APRN      Pre Drug % Predicted    FVC: 77.81%   FEV1: 73.65%   FEF 25-75%: 58%   FEV1/FVC: 76%    Interpretation   Spirometry   Spirometry shows moderate restriction. There is reduced midflow suggesting small airway/airflow obstruction.   Review of FVL curve   Patient's effort is normal.

## 2022-01-17 ENCOUNTER — TELEPHONE (OUTPATIENT)
Dept: NEUROLOGY | Facility: CLINIC | Age: 67
End: 2022-01-17

## 2022-01-17 NOTE — TELEPHONE ENCOUNTER
KALEY RENEE    SELF    [phone]    CALLED TO CONFIRM [facility] SLEEP APPT. SHE SAYS SHE SEES [facility] PULMONARY GROUP SO WAS CHECKING TO SEE IF SHE STILL NEEDS THIS APPOINTMENT. SHE SAID SHE BELIEVED [facility]ENDS THE CPAP MACHINE READS TO OUR OFFICE AND THAT THEN OUR OFFICE SENDS IT TO  PULMONARY. SHE SAYS SHE'S AT WORK AND ASKS FOR A DETAILED VOICEMAIL. PLEASE ADVISE

## 2022-01-18 ENCOUNTER — TELEPHONE (OUTPATIENT)
Dept: NEUROLOGY | Facility: CLINIC | Age: 67
End: 2022-01-18

## 2022-01-19 ENCOUNTER — OFFICE VISIT (OUTPATIENT)
Dept: NEUROLOGY | Facility: CLINIC | Age: 67
End: 2022-01-19

## 2022-01-19 VITALS
DIASTOLIC BLOOD PRESSURE: 72 MMHG | BODY MASS INDEX: 30.55 KG/M2 | OXYGEN SATURATION: 99 % | WEIGHT: 166 LBS | HEART RATE: 82 BPM | HEIGHT: 62 IN | SYSTOLIC BLOOD PRESSURE: 136 MMHG | RESPIRATION RATE: 17 BRPM

## 2022-01-19 DIAGNOSIS — G47.33 OBSTRUCTIVE SLEEP APNEA: Primary | ICD-10-CM

## 2022-01-19 PROCEDURE — 99213 OFFICE O/P EST LOW 20 MIN: CPT | Performed by: NURSE PRACTITIONER

## 2022-01-19 NOTE — PATIENT INSTRUCTIONS
Living With Sleep Apnea  Sleep apnea is a condition in which breathing pauses or becomes shallow during sleep. Sleep apnea is most commonly caused by a collapsed or blocked airway. People with sleep apnea snore loudly and have times when they gasp and stop breathing for 10 seconds or more during sleep. This happens over and over during the night. This disrupts your sleep and keeps your body from getting the rest that it needs, which can cause tiredness and lack of energy (fatigue) during the day.  The breaks in breathing also interrupt the deep sleep that you need to feel rested. Even if you do not completely wake up from the gaps in breathing, your sleep may not be restful. You may also have a headache in the morning and low energy during the day, and you may feel anxious or depressed.  How can sleep apnea affect me?  Sleep apnea increases your chances of extreme tiredness during the day (daytime fatigue). It can also increase your risk for health conditions, such as:  · Heart attack.  · Stroke.  · Diabetes.  · Heart failure.  · Irregular heartbeat.  · High blood pressure.  If you have daytime fatigue as a result of sleep apnea, you may be more likely to:  · Perform poorly at school or work.  · Fall asleep while driving.  · Have difficulty with attention.  · Develop depression or anxiety.  · Become severely overweight (obese).  · Have sexual dysfunction.  What actions can I take to manage sleep apnea?  Sleep apnea treatment    · If you were given a device to open your airway while you sleep, use it only as told by your health care provider. You may be given:  ? An oral appliance. This is a custom-made mouthpiece that shifts your lower jaw forward.  ? A continuous positive airway pressure (CPAP) device. This device blows air through a mask when you breathe out (exhale).  ? A nasal expiratory positive airway pressure (EPAP) device. This device has valves that you put into each nostril.  ? A bi-level positive airway  pressure (BPAP) device. This device blows air through a mask when you breathe in (inhale) and breathe out (exhale).  · You may need surgery if other treatments do not work for you.    Sleep habits  · Go to sleep and wake up at the same time every day. This helps set your internal clock (circadian rhythm) for sleeping.  ? If you stay up later than usual, such as on weekends, try to get up in the morning within 2 hours of your normal wake time.  · Try to get at least 7-9 hours of sleep each night.  · Stop computer, tablet, and mobile phone use a few hours before bedtime.  · Do not take long naps during the day. If you nap, limit it to 30 minutes.  · Have a relaxing bedtime routine. Reading or listening to music may relax you and help you sleep.  · Use your bedroom only for sleep.  ? Keep your television and computer out of your bedroom.  ? Keep your bedroom cool, dark, and quiet.  ? Use a supportive mattress and pillows.  · Follow your health care provider's instructions for other changes to sleep habits.  Nutrition  · Do not eat heavy meals in the evening.  · Do not have caffeine in the later part of the day. The effects of caffeine can last for more than 5 hours.  · Follow your health care provider's or dietitian's instructions for any diet changes.  Lifestyle         · Do not drink alcohol before bedtime. Alcohol can cause you to fall asleep at first, but then it can cause you to wake up in the middle of the night and have trouble getting back to sleep.  · Do not use any products that contain nicotine or tobacco, such as cigarettes and e-cigarettes. If you need help quitting, ask your health care provider.  Medicines  · Take over-the-counter and prescription medicines only as told by your health care provider.  · Do not use over-the-counter sleep medicine. You can become dependent on this medicine, and it can make sleep apnea worse.  · Do not use medicines, such as sedatives and narcotics, unless told by your  health care provider.  Activity  · Exercise on most days, but avoid exercising in the evening. Exercising near bedtime can interfere with sleeping.  · If possible, spend time outside every day. Natural light helps regulate your circadian rhythm.  General information  · Lose weight if you need to, and maintain a healthy weight.  · Keep all follow-up visits as told by your health care provider. This is important.  · If you are having surgery, make sure to tell your health care provider that you have sleep apnea. You may need to bring your device with you.  Where to find more information  Learn more about sleep apnea and daytime fatigue from:  · American Sleep Association: sleepassociation.org  · National Sleep Foundation: sleepfoundation.org  · National Heart, Lung, and Blood [place]: nhlbi.nih.gov  Summary  · Sleep apnea can cause daytime fatigue and other serious health conditions.  · Both sleep apnea and daytime fatigue can be bad for your health and well-being.  · You may need to wear a device while sleeping to help keep your airway open.  · If you are having surgery, make sure to tell your health care provider that you have sleep apnea. You may need to bring your device with you.  · Making changes to sleep habits, diet, lifestyle, and activity can help you manage sleep apnea.  This information is not intended to replace advice given to you by your health care provider. Make sure you discuss any questions you have with your health care provider.  Document Revised: 04/10/2020 Document Reviewed: 03/14/2019  [employer] Patient Education © 2021 [employer]

## 2022-01-19 NOTE — PROGRESS NOTES
Neurology Progress Note      Chief Complaint:    Obstructive sleep apnea     Subjective     Subjective:  Salud Daley is a 66 y.o. female who presents to the office today for obstructive sleep apnea.  She is routinely followed by Dr. Shelton Blanchard DO for primary care.  She was last seen by Dorian Kruger on 12/15/2020 where at that time she had elevated AHI on compliance report of 22.6.  Her CPAP settings at that time were 8 cm H2O.  He did change her settings to APAP with a minimum of 8 cm H2O and a maximum of 16 cm H2O for pressures.  Subsequent compliance reports revealed an AHI of 10.4 on 3/23/2021.  Pressure was increased to CPAP 14 cm H2O and AHI of 7.1 on 4/27/2021.  AHI on 6/11/2021 was 10.  She was changed back to APAP with a pressure of 8 cm H2O to 16 cm H2O.  Repeat compliance report on 7/9/2021 then revealed an AHI of 13.2.  She has had overnight pulse oximetry performed on 10/23/2020 which revealed no significant hypoxia while on CPAP therapy.    Presents today with some mild continued daytime hypersomnolence and a little bit of nonrestorative sleep.  This is intermittent and overall she states she feels she is sleeping better.  She does have to take melatonin on occasion.  She does report some restless sleep at times.  She continues to have an elevated AHI of 9.7 today on compliance report.  Her clear airway apnea index is 2.2.    Past Medical History:   Diagnosis Date   • Asthma    • Gout    • Hypertension    • Hypothyroid    • Insomnia    • Macular degeneration    • Restless leg syndrome      Past Surgical History:   Procedure Laterality Date   • ANKLE SURGERY Left    • CERVICAL BIOPSY  W/ LOOP ELECTRODE EXCISION     • CHOLECYSTECTOMY     • COLONOSCOPY     • DILATATION AND CURETTAGE     • ENDOSCOPY     • HYSTERECTOMY  2008    Menorrhagia   • MOLE REMOVAL     • TUBAL ABDOMINAL LIGATION  1993   • WRIST SURGERY Left     Ganglion cyst and tumor removal      Family History   Problem Relation Age of  Onset   • Heart failure Mother    • Diabetes Mother    • COPD Father    • Diabetes Father    • Clotting disorder Sister    • Breast cancer Neg Hx    • Ovarian cancer Neg Hx    • Colon cancer Neg Hx      Social History     Tobacco Use   • Smoking status: Never Smoker   • Smokeless tobacco: Never Used   Vaping Use   • Vaping Use: Never used   Substance Use Topics   • Alcohol use: Yes     Comment: Occasional   • Drug use: No     Medications:  Current Outpatient Medications   Medication Sig Dispense Refill   • Advair -21 MCG/ACT inhaler Inhale 2 puffs 2 (Two) Times a Day. 1 each 3   • albuterol sulfate  (90 Base) MCG/ACT inhaler Inhale 2 puffs Every 6 (Six) Hours As Needed for Wheezing. 18 g 3   • allopurinol (ZYLOPRIM) 100 MG tablet Take 100 mg by mouth Daily.     • cetirizine (ZyrTEC Allergy) 10 MG tablet Take 10 mg by mouth Every Night.     • cholecalciferol (VITAMIN D3) 25 MCG (1000 UT) tablet Take 1,000 Units by mouth 2 (Two) Times a Day.     • citalopram (CeleXA) 40 MG tablet Take 40 mg by mouth Daily.     • colestipol (COLESTID) 1 G tablet Take 1 g by mouth Daily.     • irbesartan (AVAPRO) 75 MG tablet Take 75 mg by mouth Daily. FOR 30 DAYS     • levothyroxine (SYNTHROID, LEVOTHROID) 50 MCG tablet Take 50 mcg by mouth Daily.     • Misc. Devices misc C-PAP     • naltrexone (DEPADE) 50 MG tablet Take 50 mg by mouth Daily. Half a pill a day     • pantoprazole (PROTONIX) 40 MG EC tablet Take 40 mg by mouth Daily.     • rOPINIRole (REQUIP) 0.5 MG tablet Take 0.25 mg by mouth Daily.     • calcium carbonate (OS-CARLOS) 600 MG tablet Take 600 mg by mouth Daily.     • Medium Chain Triglycerides (MCT OIL PO) Take  by mouth Daily.     • multivitamin with minerals tablet tablet Take 1 tablet by mouth Daily.       No current facility-administered medications for this visit.     Current outpatient and discharge medications have been reconciled for the patient.  Reviewed by: VIN Ly      Allergies:     Fluoxetine, Latex, Nickel, Paxil [paroxetine hcl], and Iodine    Review of Systems:   Review of Systems   Psychiatric/Behavioral: Positive for sleep disturbance.   All other systems reviewed and are negative.    Objective      Vital Signs  Heart Rate:  [82] 82  Resp:  [17] 17  BP: (136)/(72) 136/72    Physical Exam:  Physical Exam  Constitutional:       Appearance: Normal appearance.   HENT:      Head: Normocephalic.   Eyes:      Extraocular Movements: Extraocular movements intact.      Pupils: Pupils are equal, round, and reactive to light.   Cardiovascular:      Rate and Rhythm: Normal rate and regular rhythm.      Pulses: Normal pulses.   Pulmonary:      Effort: Pulmonary effort is normal.   Musculoskeletal:         General: Normal range of motion.      Cervical back: Normal range of motion and neck supple.   Skin:     General: Skin is warm and dry.      Capillary Refill: Capillary refill takes less than 2 seconds.   Neurological:      General: No focal deficit present.      Mental Status: She is alert and oriented to person, place, and time. Mental status is at baseline.      Cranial Nerves: Cranial nerves are intact.      Sensory: Sensation is intact.      Motor: Motor function is intact.      Coordination: Coordination is intact.      Gait: Gait is intact.      Deep Tendon Reflexes: Reflexes are normal and symmetric.   Psychiatric:         Mood and Affect: Mood normal.       Mallampati Classification: II (hard and soft palate, upper portion of tonsils anduvula visible)       Results Review:      Reynolds Sleepiness Scale: 9    STOP-BANG: Low risk MANUEL    Compliance Report:   This compliance report is for the dates of 12/18/2021-6 1/16/2022.  The patient used the PAP device for 30/30 days for a 100 % compliance.  Of those days, the device was used for greater than 4 hours for 28/30 days for a 90.3% compliance.  The patient is set on APAP with a minimum pressure of 8 cm H2O and a maximum pressure of 16 cm H2O.   Average CPAP pressure used was 10.1 cm H2O.  AHI is currently 9.7.    Assessment/Plan     Impression:  • Obstructive sleep apnea  • Appropriate compliance with CPAP      Plan:  · Continue with CPAP therapy. The patient recognizes the need for adherence to the prescribed therapy.      · Overnight pulse oximetry study to evaluate for hypoxic events.    · I have recommended regular cardiovascular exercise in the form of walking, biking or swimming 30-40 minutes at a time at least 3-4 times per week.    · Counseled on multimodal approach to treatment of sleep apnea to include but not limited to diet, exercise, sleep hygiene, compliance with pap therapy.     · Encouraged lateral sleeping position and to avoid sedatives or alcohol close to bedtime.     · Risks of untreated sleep apnea were discussed to include but not limited to HTN, heart disease, stroke, cardiac arrhythmia such as AFIB, and dementia.    · I have reviewed the current compliance download with the patient in detail.  She  understands that a certain level of compliance allows for continued insurance coverage as well as adequate treatment of underlying apnea.  She also understands the impact this has upon their overall health status and other medical comorbidities.     The plan of care was fully discussed with the patient and they are in full agreement at this time.     Follow-Up:  Return in about 3 months (around 4/19/2022) for Obstructive sleep apnea follow-up.      Nic Marti, VIN  01/19/22  15:58 CST

## 2022-01-26 ENCOUNTER — TELEPHONE (OUTPATIENT)
Dept: NEUROLOGY | Facility: CLINIC | Age: 67
End: 2022-01-26

## 2022-01-26 NOTE — TELEPHONE ENCOUNTER
Nic RENEE    SELF    [phone]    [facility] JUST DROPPED OFF O2 FOR HER TO USE WITH MACHINE. SHE'S UNSURE HOW TO USE. PLEASE ADVISE

## 2022-02-02 DIAGNOSIS — G47.33 OBSTRUCTIVE SLEEP APNEA: ICD-10-CM

## 2022-04-14 ENCOUNTER — TRANSCRIBE ORDERS (OUTPATIENT)
Dept: GENERAL RADIOLOGY | Facility: HOSPITAL | Age: 67
End: 2022-04-14

## 2022-04-14 ENCOUNTER — HOSPITAL ENCOUNTER (OUTPATIENT)
Dept: GENERAL RADIOLOGY | Facility: HOSPITAL | Age: 67
Discharge: HOME | End: 2022-04-14
Admitting: INTERNAL MEDICINE

## 2022-04-14 DIAGNOSIS — M20.009 FINGER DEFORMITY: ICD-10-CM

## 2022-04-14 DIAGNOSIS — M20.009 FINGER DEFORMITY: Primary | ICD-10-CM

## 2022-04-14 DIAGNOSIS — R25.2 CRAMP OF LIMB: ICD-10-CM

## 2022-04-14 DIAGNOSIS — M79.651 RIGHT THIGH PAIN: ICD-10-CM

## 2022-04-14 PROCEDURE — 73552 X-RAY EXAM OF FEMUR 2/>: CPT

## 2022-04-14 PROCEDURE — 73140 X-RAY EXAM OF FINGER(S): CPT

## 2022-04-27 ENCOUNTER — OFFICE VISIT (OUTPATIENT)
Dept: NEUROLOGY | Facility: CLINIC | Age: 67
End: 2022-04-27

## 2022-04-27 VITALS
OXYGEN SATURATION: 98 % | SYSTOLIC BLOOD PRESSURE: 136 MMHG | BODY MASS INDEX: 30.55 KG/M2 | HEIGHT: 62 IN | DIASTOLIC BLOOD PRESSURE: 80 MMHG | RESPIRATION RATE: 17 BRPM | WEIGHT: 166 LBS | HEART RATE: 87 BPM

## 2022-04-27 DIAGNOSIS — G47.19 DAYTIME HYPERSOMNOLENCE: ICD-10-CM

## 2022-04-27 DIAGNOSIS — G47.33 OBSTRUCTIVE SLEEP APNEA: Primary | ICD-10-CM

## 2022-04-27 PROCEDURE — 99213 OFFICE O/P EST LOW 20 MIN: CPT | Performed by: NURSE PRACTITIONER

## 2022-04-27 RX ORDER — CLINDAMYCIN PHOSPHATE 10 MG/G
GEL TOPICAL
COMMUNITY
Start: 2022-04-20 | End: 2023-02-02

## 2022-04-27 NOTE — PROGRESS NOTES
Neurology Progress Note      Chief Complaint:    Obstructive sleep apnea     Subjective     Subjective:  Salud Daley is a 66 y.o. female who presents to the office today for obstructive sleep apnea.  She is routinely followed by Dr. Shelton Jacobs DO for primary care.  She had overnight oximetry study performed at our last visit which revealed no desaturations or time less than 90%.  She aHI was mild elevated at our last encounter at 9.7.  She had continued to complain of some mild/intermittent daytime sleepiness and non-restorative sleep despite also saying she really felt better overall.     She presents today stating that since last being seen she has noted some difficulties sleeping secondary to a pulled muscle.  This has resolved.  However now, she notes that she has been itching secondary to using a body wash which has an ingredient that she is allergic to.  She is going to be seeing an allergist soon.  Regardless, this is causing disruption to her sleep.  With regard to her CPAP, she is complaining of more leaking around her mask.  Despite a 99.4% mask fit from compliance report she continues to note significant leaking which will awaken her at times.  She then will take the mask off and not use it the rest of the night.  She is requesting a mask fitting to get an appropriate mask.   She denies snoring over therapy or awakening gasping for air.  She is mildly sleepy during the day secondary to poor sleep from her other underlying conditions and poor use of her CPAP.  She also does indicate that she will have some non restorative sleep.  This is likely secondary to the fact that she will take off her CPAP prior to a full nights rest.  Additionally she does have significant leaking which can cause breakthrough apneic events as a result.    Past Medical History:   Diagnosis Date   • Asthma    • Gout    • Hypertension    • Hypothyroid    • Insomnia    • Macular degeneration    • Restless leg syndrome      Past  Surgical History:   Procedure Laterality Date   • ANKLE SURGERY Left    • CERVICAL BIOPSY  W/ LOOP ELECTRODE EXCISION     • CHOLECYSTECTOMY     • COLONOSCOPY     • DILATATION AND CURETTAGE     • ENDOSCOPY     • HYSTERECTOMY  2008    Menorrhagia   • MOLE REMOVAL     • TUBAL ABDOMINAL LIGATION  1993   • WRIST SURGERY Left     Ganglion cyst and tumor removal      Family History   Problem Relation Age of Onset   • Heart failure Mother    • Diabetes Mother    • COPD Father    • Diabetes Father    • Clotting disorder Sister    • Breast cancer Neg Hx    • Ovarian cancer Neg Hx    • Colon cancer Neg Hx      Social History     Tobacco Use   • Smoking status: Never Smoker   • Smokeless tobacco: Never Used   Vaping Use   • Vaping Use: Never used   Substance Use Topics   • Alcohol use: Yes     Comment: Occasional   • Drug use: No     Medications:  Current Outpatient Medications   Medication Sig Dispense Refill   • Advair -21 MCG/ACT inhaler Inhale 2 puffs 2 (Two) Times a Day. 1 each 3   • albuterol sulfate  (90 Base) MCG/ACT inhaler Inhale 2 puffs Every 6 (Six) Hours As Needed for Wheezing. 18 g 3   • allopurinol (ZYLOPRIM) 100 MG tablet Take 100 mg by mouth Daily.     • cetirizine (zyrTEC) 10 MG tablet Take 10 mg by mouth Every Night.     • cholecalciferol (VITAMIN D3) 25 MCG (1000 UT) tablet Take 1,000 Units by mouth 2 (Two) Times a Day.     • citalopram (CeleXA) 40 MG tablet Take 40 mg by mouth Daily.     • clindamycin (CLINDAGEL) 1 % gel APPLY GEL TWICE A DAY TO FACE WHEN AND WHERE RASH IS PRESENT TO TREAT DERMATITIS.     • colestipol (COLESTID) 1 G tablet Take 1 g by mouth Daily.     • irbesartan (AVAPRO) 75 MG tablet Take 75 mg by mouth Daily. FOR 30 DAYS     • levothyroxine (SYNTHROID, LEVOTHROID) 50 MCG tablet Take 50 mcg by mouth Daily.     • Misc. Devices misc C-PAP     • naltrexone (DEPADE) 50 MG tablet Take 50 mg by mouth Daily. Half a pill a day     • pantoprazole (PROTONIX) 40 MG EC tablet Take 40 mg  by mouth Daily.     • rOPINIRole (REQUIP) 0.5 MG tablet Take 0.25 mg by mouth Daily.     • calcium carbonate (OS-CARLOS) 600 MG tablet Take 600 mg by mouth Daily.     • Medium Chain Triglycerides (MCT OIL PO) Take  by mouth Daily.     • multivitamin with minerals tablet tablet Take 1 tablet by mouth Daily.       No current facility-administered medications for this visit.     Current outpatient and discharge medications have been reconciled for the patient.  Reviewed by: VIN Ly      Allergies:    Fluoxetine, Latex, Nickel, Paxil [paroxetine hcl], and Iodine    Review of Systems:   Review of Systems   Psychiatric/Behavioral: Positive for sleep disturbance.   All other systems reviewed and are negative.    Objective      Vital Signs  Heart Rate:  [87] 87  Resp:  [17] 17  BP: (136)/(80) 136/80    Physical Exam:  Physical Exam  Constitutional:       Appearance: Normal appearance.   HENT:      Head: Normocephalic.   Eyes:      Extraocular Movements: Extraocular movements intact.      Pupils: Pupils are equal, round, and reactive to light.   Cardiovascular:      Rate and Rhythm: Normal rate and regular rhythm.      Pulses: Normal pulses.   Pulmonary:      Effort: Pulmonary effort is normal.   Musculoskeletal:         General: Normal range of motion.      Cervical back: Normal range of motion and neck supple.   Skin:     General: Skin is warm and dry.      Capillary Refill: Capillary refill takes less than 2 seconds.   Neurological:      General: No focal deficit present.      Mental Status: She is alert and oriented to person, place, and time. Mental status is at baseline.      Cranial Nerves: Cranial nerves are intact.      Sensory: Sensation is intact.      Motor: Motor function is intact.      Coordination: Coordination is intact.      Gait: Gait is intact.      Deep Tendon Reflexes: Reflexes are normal and symmetric.   Psychiatric:         Mood and Affect: Mood normal.     Neck circumference 14  inches  Mallampati Classification: II (hard and soft palate, upper portion of tonsils anduvula visible)       Results Review:      [place] Sleepiness Scale: 7    STOP-BANG: Moderate Risk MANUEL    PULMONARY RESULTS - SCAN - PULSE OXIMETRY, OVERNIGHT (02/02/2022)      Compliance Report:   This compliance report is for the dates 3/28/2022-4/26/2022.  Patient use the device for 29/30 days for a 96.7% compliance of the states patient used device for greater than 4 hours for 18 days for a 60% compliance.  She is currently set on APAP with a minimum pressure of 8 cm H2O and a maximum pressure of 16 cm H2O.  Average pressure is used 10.5 cm H2O.  Average unintended leak is 17.7 L/min.  Current AHI is 13.8.    Assessment/Plan     Impression:  • Obstructive sleep apnea  • Poor Compliance with CPAP      Plan:  · Continue with CPAP therapy. The patient recognizes the need for adherence to the prescribed therapy.  I have encouraged her to increase compliance as her time greater than 4 hours is only 60%.  She contributes her leaking of her mask as cause to not using her CPAP for the duration of the night.     · Will send order for mask fitting to ensure we have appropriately fitting mask with minimal leaking.  Patient's report shows an average unintended leak of 17.7 L/min.  She notes significant leaking which will often wake her up during the night.  This could be contributory to the fact that she has continued elevated AHI at this time.    · I have recommended regular cardiovascular exercise in the form of walking, biking or swimming 30-40 minutes at a time at least 3-4 times per week.    · Counseled on multimodal approach to treatment of sleep apnea to include but not limited to diet, exercise, sleep hygiene, compliance with pap therapy.     · Encouraged lateral sleeping position and to avoid sedatives or alcohol close to bedtime.     · Risks of untreated sleep apnea were discussed to include but not limited to HTN, heart  disease, stroke, cardiac arrhythmia such as AFIB, and dementia.    · I have reviewed the current compliance download with the patient in detail.  She  understands that a certain level of compliance allows for continued insurance coverage as well as adequate treatment of underlying apnea.  She also understands the impact this has upon their overall health status and other medical comorbidities.     I will check compliance report in 30 days to see how her AHI looks with new mask fitting.  I do remain concerned for the need to have an updated titration study secondary to the fact that she has persistent elevated AHI's and reported central apneas on her previous titration study in 2020.  This is concerning for the possibility of requiring BiPAP therapy.  However, the patient's overnight pulse oximetry did report 0 minutes less than 89%.  Therefore, I do believe we should ensure that she has a proper seal to her mask and ensure that her AHI is not falsely elevated as a result.  I did discuss this with the patient in great detail she states understanding.    The plan of care was fully discussed with the patient and they are in full agreement at this time.     Follow-Up:  Return in about 3 months (around 7/27/2022) for Obstructive sleep apnea follow-up.      Nic Marti, APRN  04/27/22  11:20 CDT

## 2022-05-16 NOTE — PROGRESS NOTES
" VIN Ross  [facility]   Pulmonary and Critical Care  [address]  [place] [address]  Phone: [phone]  Fax: [phone]           Chief Complaint  Sleep Apnea and Mild intermittent asthma without complication    Subjective    History of Present Illness     Salud Daley presents to [facility] PULMONARY & CRITICAL CARE MEDICINE   Ms. Daley is a pleasant 66-year-old female with known mild intermittent asthma and cough.  She also has known obstructive sleep apnea and follows Rudy Kruger PA-C. She is compliant with use. She states they trying to see why she is still having high apneic events despite mask changes and setting adjustments. She has continued on her Advair and reports no exacerbations at this time.  Last use of rescue inhaler was a long time ago. She has apt to see Dr. Valdez. She itches all the time. She has dermatitis around her nose at this time. She takes generic zyrtec for allergies and finds benefit.        Objective   Vital Signs:   /74   Pulse 102   Ht 157.5 cm (62\")   Wt 85.6 kg (188 lb 12.8 oz)   BMI 34.53 kg/m²     Physical Exam  Vitals reviewed.   Constitutional:       Appearance: Normal appearance. She is morbidly obese.   Cardiovascular:      Rate and Rhythm: Normal rate and regular rhythm.   Pulmonary:      Effort: Pulmonary effort is normal.      Breath sounds: Normal breath sounds.   Neurological:      General: No focal deficit present.      Mental Status: She is alert and oriented to person, place, and time.   Psychiatric:         Mood and Affect: Mood normal.         Behavior: Behavior normal.          Result Review :  The following data was reviewed by: VIN Ross on 05/18/2022:    My interpretation of imaging:  No new   My interpretation of labs: No New     PFT Values        Some values may be hidden. Unless noted otherwise, only the newest values recorded on each date are displayed.         Old " Values PFT Results 21   No data to display.      Pre Drug PFT Results 21   FVC 77.81   FEV1 73.65   FEF 25-75% 58   FEV1/FVC 76      Post Drug PFT Results 21   No data to display.      Other Tests PFT Results 21   No data to display.           My interpretation of the PFT: No new     Results for orders placed in visit on 21    Pulmonary Function Test    Narrative  Pulmonary Function Test  Performed by: Alyx Sanford APRN  Authorized by: Alyx Sanford APRN    Pre Drug % Predicted  FVC: 77.81%  FEV1: 73.65%  FEF 25-75%: 58%  FEV1/FVC: 76%    Interpretation  Spirometry  Spirometry shows moderate restriction. There is reduced midflow suggesting small airway/airflow obstruction.  Review of FVL curve  Patient's effort is normal.      Results for orders placed in visit on 20    Pulmonary Function Test    Narrative  Pulmonary Function Test  Performed by: Alyx Sanford APRN  Authorized by: Alyx Sanford APRN    Pre Drug  FVC: 74%  FEV1: 68%  FEF 25-75%: 49%  FEV1/FVC: 74.04%  T%  RV: 112%  DLCO: 114%  D/VAsb: 143%      Patient's BMI 34.53. BMI is above normal parameters. Recommendations include: referral to primary care.    Assessment and Plan   Diagnoses and all orders for this visit:    1. Mild intermittent asthma without complication (Primary)    2. MANUEL on CPAP      She is doing well from a pulmonary standpoint. She states she is now following with Dr. Valdez for her allergies and will get getting patch tested. She is up to date on her immunizations. He weight has increased. She states she had been on a diet however she is no longer on that diet and has gained all her weight back. She is asked to follow up with us in one year.     Alpha 1: None     Health maintenance:   Please see immunization tab. I have discussed/ reviewed/ updated immunizations. Ordered immunizations as appropriate if available to administer in office or referred to  appropriate facility to obtain.     Follow Up   No follow-ups on file.  Patient was given instructions and counseling regarding her condition or for health maintenance advice. Please see specific information pulled into the AVS if appropriate.     Alyx Sanford, APRN  5/18/2022  09:34 CDT

## 2022-05-18 ENCOUNTER — OFFICE VISIT (OUTPATIENT)
Dept: PULMONOLOGY | Facility: CLINIC | Age: 67
End: 2022-05-18

## 2022-05-18 VITALS
HEIGHT: 62 IN | SYSTOLIC BLOOD PRESSURE: 126 MMHG | WEIGHT: 188.8 LBS | DIASTOLIC BLOOD PRESSURE: 74 MMHG | HEART RATE: 102 BPM | BODY MASS INDEX: 34.74 KG/M2

## 2022-05-18 DIAGNOSIS — Z99.89 OSA ON CPAP: ICD-10-CM

## 2022-05-18 DIAGNOSIS — G47.33 OSA ON CPAP: ICD-10-CM

## 2022-05-18 DIAGNOSIS — J45.20 MILD INTERMITTENT ASTHMA WITHOUT COMPLICATION: Primary | ICD-10-CM

## 2022-05-18 PROCEDURE — 99213 OFFICE O/P EST LOW 20 MIN: CPT | Performed by: NURSE PRACTITIONER

## 2022-05-25 ENCOUNTER — APPOINTMENT (OUTPATIENT)
Dept: GENERAL RADIOLOGY | Facility: HOSPITAL | Age: 67
End: 2022-05-25

## 2022-05-25 ENCOUNTER — HOSPITAL ENCOUNTER (EMERGENCY)
Facility: HOSPITAL | Age: 67
Discharge: ANOTHER HOSPITAL | End: 2022-05-25
Attending: EMERGENCY MEDICINE | Admitting: EMERGENCY MEDICINE

## 2022-05-25 ENCOUNTER — APPOINTMENT (OUTPATIENT)
Dept: CT IMAGING | Facility: HOSPITAL | Age: 67
End: 2022-05-25

## 2022-05-25 VITALS
HEART RATE: 93 BPM | DIASTOLIC BLOOD PRESSURE: 75 MMHG | HEIGHT: 62 IN | WEIGHT: 185 LBS | RESPIRATION RATE: 15 BRPM | BODY MASS INDEX: 34.04 KG/M2 | OXYGEN SATURATION: 98 % | TEMPERATURE: 98 F | SYSTOLIC BLOOD PRESSURE: 152 MMHG

## 2022-05-25 DIAGNOSIS — J93.9 PNEUMOTHORAX, RIGHT: ICD-10-CM
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
## 2022-05-25 DIAGNOSIS — M79.642 LEFT HAND PAIN: ICD-10-CM

## 2022-05-25 DIAGNOSIS — S22.41XA CLOSED FRACTURE OF MULTIPLE RIBS OF RIGHT SIDE, INITIAL ENCOUNTER: ICD-10-CM

## 2022-05-25 DIAGNOSIS — S29.9XXA TRAUMATIC INJURY OF RIB: ICD-10-CM

## 2022-05-25 LAB
ALBUMIN SERPL-MCNC: 4.1 G/DL (ref 3.5–5.2)
ALBUMIN/GLOB SERPL: 1.6 G/DL
ALP SERPL-CCNC: 118 U/L (ref 39–117)
ALT SERPL W P-5'-P-CCNC: 37 U/L (ref 1–33)
ANION GAP SERPL CALCULATED.3IONS-SCNC: 12 MMOL/L (ref 5–15)
APTT PPP: 21.7 SECONDS (ref 24.1–35)
AST SERPL-CCNC: 70 U/L (ref 1–32)
BASOPHILS # BLD AUTO: 0.06 10*3/MM3 (ref 0–0.2)
BASOPHILS NFR BLD AUTO: 0.3 % (ref 0–1.5)
BILIRUB SERPL-MCNC: 0.4 MG/DL (ref 0–1.2)
BUN SERPL-MCNC: 23 MG/DL (ref 8–23)
BUN/CREAT SERPL: 28.4 (ref 7–25)
CALCIUM SPEC-SCNC: 8.7 MG/DL (ref 8.6–10.5)
CHLORIDE SERPL-SCNC: 101 MMOL/L (ref 98–107)
CO2 SERPL-SCNC: 27 MMOL/L (ref 22–29)
CREAT SERPL-MCNC: 0.81 MG/DL (ref 0.57–1)
DEPRECATED RDW RBC AUTO: 40.6 FL (ref 37–54)
EGFRCR SERPLBLD CKD-EPI 2021: 80.2 ML/MIN/1.73
EOSINOPHIL # BLD AUTO: 0.22 10*3/MM3 (ref 0–0.4)
EOSINOPHIL NFR BLD AUTO: 1.1 % (ref 0.3–6.2)
ERYTHROCYTE [DISTWIDTH] IN BLOOD BY AUTOMATED COUNT: 13.3 % (ref 12.3–15.4)
GLOBULIN UR ELPH-MCNC: 2.6 GM/DL
GLUCOSE SERPL-MCNC: 151 MG/DL (ref 65–99)
HCT VFR BLD AUTO: 36.7 % (ref 34–46.6)
HGB BLD-MCNC: 11.5 G/DL (ref 12–15.9)
HOLD SPECIMEN: NORMAL
HOLD SPECIMEN: NORMAL
IMM GRANULOCYTES # BLD AUTO: 0.35 10*3/MM3 (ref 0–0.05)
IMM GRANULOCYTES NFR BLD AUTO: 1.7 % (ref 0–0.5)
INR PPP: 0.98 (ref 0.91–1.09)
LYMPHOCYTES # BLD AUTO: 3.79 10*3/MM3 (ref 0.7–3.1)
LYMPHOCYTES NFR BLD AUTO: 18.9 % (ref 19.6–45.3)
MCH RBC QN AUTO: 26.3 PG (ref 26.6–33)
MCHC RBC AUTO-ENTMCNC: 31.3 G/DL (ref 31.5–35.7)
MCV RBC AUTO: 84 FL (ref 79–97)
MONOCYTES # BLD AUTO: 0.61 10*3/MM3 (ref 0.1–0.9)
MONOCYTES NFR BLD AUTO: 3 % (ref 5–12)
NEUTROPHILS NFR BLD AUTO: 15.06 10*3/MM3 (ref 1.7–7)
NEUTROPHILS NFR BLD AUTO: 75 % (ref 42.7–76)
NRBC BLD AUTO-RTO: 0 /100 WBC (ref 0–0.2)
PLATELET # BLD AUTO: 383 10*3/MM3 (ref 140–450)
PMV BLD AUTO: 11.1 FL (ref 6–12)
POTASSIUM SERPL-SCNC: 3.8 MMOL/L (ref 3.5–5.2)
PROT SERPL-MCNC: 6.7 G/DL (ref 6–8.5)
PROTHROMBIN TIME: 12.6 SECONDS (ref 11.9–14.6)
RBC # BLD AUTO: 4.37 10*6/MM3 (ref 3.77–5.28)
SARS-COV-2 RNA PNL SPEC NAA+PROBE: NOT DETECTED
SODIUM SERPL-SCNC: 140 MMOL/L (ref 136–145)
TROPONIN T SERPL-MCNC: <0.01 NG/ML (ref 0–0.03)
WBC NRBC COR # BLD: 20.09 10*3/MM3 (ref 3.4–10.8)
WHOLE BLOOD HOLD COAG: NORMAL
WHOLE BLOOD HOLD SPECIMEN: NORMAL

## 2022-05-25 PROCEDURE — 25010000002 METHYLPREDNISOLONE PER 125 MG: Performed by: EMERGENCY MEDICINE

## 2022-05-25 PROCEDURE — 74177 CT ABD & PELVIS W/CONTRAST: CPT

## 2022-05-25 PROCEDURE — 93010 ELECTROCARDIOGRAM REPORT: CPT | Performed by: EMERGENCY MEDICINE

## 2022-05-25 PROCEDURE — 80053 COMPREHEN METABOLIC PANEL: CPT

## 2022-05-25 PROCEDURE — 25010000002 MORPHINE PER 10 MG: Performed by: EMERGENCY MEDICINE

## 2022-05-25 PROCEDURE — 25010000002 TETANUS-DIPHTH-ACELL PERTUSSIS 5-2.5-18.5 LF-MCG/0.5 SUSPENSION PREFILLED SYRINGE: Performed by: EMERGENCY MEDICINE

## 2022-05-25 PROCEDURE — 96374 THER/PROPH/DIAG INJ IV PUSH: CPT

## 2022-05-25 PROCEDURE — 71260 CT THORAX DX C+: CPT

## 2022-05-25 PROCEDURE — 96376 TX/PRO/DX INJ SAME DRUG ADON: CPT

## 2022-05-25 PROCEDURE — 73090 X-RAY EXAM OF FOREARM: CPT

## 2022-05-25 PROCEDURE — 25010000002 IOPAMIDOL 61 % SOLUTION: Performed by: EMERGENCY MEDICINE

## 2022-05-25 PROCEDURE — 90715 TDAP VACCINE 7 YRS/> IM: CPT | Performed by: EMERGENCY MEDICINE

## 2022-05-25 PROCEDURE — 90471 IMMUNIZATION ADMIN: CPT | Performed by: EMERGENCY MEDICINE

## 2022-05-25 PROCEDURE — 72128 CT CHEST SPINE W/O DYE: CPT

## 2022-05-25 PROCEDURE — 36415 COLL VENOUS BLD VENIPUNCTURE: CPT

## 2022-05-25 PROCEDURE — 73030 X-RAY EXAM OF SHOULDER: CPT

## 2022-05-25 PROCEDURE — 85610 PROTHROMBIN TIME: CPT | Performed by: EMERGENCY MEDICINE

## 2022-05-25 PROCEDURE — 96375 TX/PRO/DX INJ NEW DRUG ADDON: CPT

## 2022-05-25 PROCEDURE — 84484 ASSAY OF TROPONIN QUANT: CPT | Performed by: EMERGENCY MEDICINE

## 2022-05-25 PROCEDURE — 25010000002 FENTANYL CITRATE (PF) 50 MCG/ML SOLUTION: Performed by: EMERGENCY MEDICINE

## 2022-05-25 PROCEDURE — 93005 ELECTROCARDIOGRAM TRACING: CPT

## 2022-05-25 PROCEDURE — 87635 SARS-COV-2 COVID-19 AMP PRB: CPT | Performed by: EMERGENCY MEDICINE

## 2022-05-25 PROCEDURE — 70486 CT MAXILLOFACIAL W/O DYE: CPT

## 2022-05-25 PROCEDURE — 73070 X-RAY EXAM OF ELBOW: CPT

## 2022-05-25 PROCEDURE — 73110 X-RAY EXAM OF WRIST: CPT

## 2022-05-25 PROCEDURE — 25010000002 DIPHENHYDRAMINE PER 50 MG: Performed by: EMERGENCY MEDICINE

## 2022-05-25 PROCEDURE — 85730 THROMBOPLASTIN TIME PARTIAL: CPT | Performed by: EMERGENCY MEDICINE

## 2022-05-25 PROCEDURE — 72131 CT LUMBAR SPINE W/O DYE: CPT

## 2022-05-25 PROCEDURE — 99284 EMERGENCY DEPT VISIT MOD MDM: CPT

## 2022-05-25 PROCEDURE — 85025 COMPLETE CBC W/AUTO DIFF WBC: CPT

## 2022-05-25 PROCEDURE — 70450 CT HEAD/BRAIN W/O DYE: CPT

## 2022-05-25 PROCEDURE — 72125 CT NECK SPINE W/O DYE: CPT

## 2022-05-25 PROCEDURE — 73130 X-RAY EXAM OF HAND: CPT

## 2022-05-25 RX ORDER — DIPHENHYDRAMINE HYDROCHLORIDE 50 MG/ML
25 INJECTION INTRAMUSCULAR; INTRAVENOUS ONCE
Status: COMPLETED | OUTPATIENT
Start: 2022-05-25 | End: 2022-05-25

## 2022-05-25 RX ORDER — FENTANYL CITRATE 50 UG/ML
50 INJECTION, SOLUTION INTRAMUSCULAR; INTRAVENOUS ONCE
Status: COMPLETED | OUTPATIENT
Start: 2022-05-25 | End: 2022-05-25

## 2022-05-25 RX ORDER — SODIUM CHLORIDE 0.9 % (FLUSH) 0.9 %
10 SYRINGE (ML) INJECTION AS NEEDED
Status: DISCONTINUED | OUTPATIENT
Start: 2022-05-25 | End: 2022-05-25 | Stop reason: HOSPADM

## 2022-05-25 RX ORDER — METHYLPREDNISOLONE SODIUM SUCCINATE 125 MG/2ML
125 INJECTION, POWDER, LYOPHILIZED, FOR SOLUTION INTRAMUSCULAR; INTRAVENOUS ONCE
Status: COMPLETED | OUTPATIENT
Start: 2022-05-25 | End: 2022-05-25

## 2022-05-25 RX ADMIN — TETANUS TOXOID, REDUCED DIPHTHERIA TOXOID AND ACELLULAR PERTUSSIS VACCINE, ADSORBED 0.5 ML: 5; 2.5; 8; 8; 2.5 SUSPENSION INTRAMUSCULAR at 11:31

## 2022-05-25 RX ADMIN — MORPHINE SULFATE 4 MG: 4 INJECTION, SOLUTION INTRAMUSCULAR; INTRAVENOUS at 12:14

## 2022-05-25 RX ADMIN — IOPAMIDOL 100 ML: 612 INJECTION, SOLUTION INTRAVENOUS at 14:24

## 2022-05-25 RX ADMIN — DIPHENHYDRAMINE HYDROCHLORIDE 25 MG: 50 INJECTION, SOLUTION INTRAMUSCULAR; INTRAVENOUS at 13:17

## 2022-05-25 RX ADMIN — MORPHINE SULFATE 4 MG: 4 INJECTION, SOLUTION INTRAMUSCULAR; INTRAVENOUS at 15:16

## 2022-05-25 RX ADMIN — METHYLPREDNISOLONE SODIUM SUCCINATE 125 MG: 125 INJECTION, POWDER, FOR SOLUTION INTRAMUSCULAR; INTRAVENOUS at 11:29

## 2022-05-25 RX ADMIN — FENTANYL CITRATE 50 MCG: 50 INJECTION INTRAMUSCULAR; INTRAVENOUS at 11:28

## 2022-05-26 LAB
QT INTERVAL: 372 MS
QTC INTERVAL: 439 MS

## 2022-06-03 ENCOUNTER — HOSPITAL ENCOUNTER (INPATIENT)
Age: 67
LOS: 7 days | Discharge: HOME HEALTH | DRG: 561 | End: 2022-06-10
Attending: PSYCHIATRY & NEUROLOGY | Admitting: PSYCHIATRY & NEUROLOGY
Payer: MEDICARE

## 2022-06-03 DIAGNOSIS — T07.XXXA MULTIPLE TRAUMA: Primary | ICD-10-CM

## 2022-06-03 LAB
BASOPHILS ABSOLUTE: 0 K/UL (ref 0–0.2)
BASOPHILS RELATIVE PERCENT: 0.5 % (ref 0–1)
EOSINOPHILS ABSOLUTE: 0.3 K/UL (ref 0–0.6)
EOSINOPHILS RELATIVE PERCENT: 3.7 % (ref 0–5)
HCT VFR BLD CALC: 32.3 % (ref 37–47)
HEMOGLOBIN: 9.7 G/DL (ref 12–16)
IMMATURE GRANULOCYTES #: 0.2 K/UL
LYMPHOCYTES ABSOLUTE: 1.5 K/UL (ref 1.1–4.5)
LYMPHOCYTES RELATIVE PERCENT: 19.9 % (ref 20–40)
MCH RBC QN AUTO: 25.9 PG (ref 27–31)
MCHC RBC AUTO-ENTMCNC: 30 G/DL (ref 33–37)
MCV RBC AUTO: 86.4 FL (ref 81–99)
MONOCYTES ABSOLUTE: 0.7 K/UL (ref 0–0.9)
MONOCYTES RELATIVE PERCENT: 8.7 % (ref 0–10)
NEUTROPHILS ABSOLUTE: 4.9 K/UL (ref 1.5–7.5)
NEUTROPHILS RELATIVE PERCENT: 64.6 % (ref 50–65)
PDW BLD-RTO: 16 % (ref 11.5–14.5)
PLATELET # BLD: 394 K/UL (ref 130–400)
PMV BLD AUTO: 10.3 FL (ref 9.4–12.3)
PREALBUMIN: 28 MG/DL (ref 20–40)
RBC # BLD: 3.74 M/UL (ref 4.2–5.4)
WBC # BLD: 7.6 K/UL (ref 4.8–10.8)

## 2022-06-03 PROCEDURE — 36415 COLL VENOUS BLD VENIPUNCTURE: CPT

## 2022-06-03 PROCEDURE — 94640 AIRWAY INHALATION TREATMENT: CPT

## 2022-06-03 PROCEDURE — 85025 COMPLETE CBC W/AUTO DIFF WBC: CPT

## 2022-06-03 PROCEDURE — 84134 ASSAY OF PREALBUMIN: CPT

## 2022-06-03 PROCEDURE — 1180000000 HC REHAB R&B

## 2022-06-03 PROCEDURE — 6360000002 HC RX W HCPCS: Performed by: PSYCHIATRY & NEUROLOGY

## 2022-06-03 PROCEDURE — 6370000000 HC RX 637 (ALT 250 FOR IP): Performed by: PSYCHIATRY & NEUROLOGY

## 2022-06-03 RX ORDER — ROPINIROLE 0.25 MG/1
0.5 TABLET, FILM COATED ORAL NIGHTLY
Status: DISCONTINUED | OUTPATIENT
Start: 2022-06-03 | End: 2022-06-10 | Stop reason: HOSPADM

## 2022-06-03 RX ORDER — CLINDAMYCIN PHOSPHATE 10 MG/G
GEL TOPICAL 2 TIMES DAILY
Status: DISCONTINUED | OUTPATIENT
Start: 2022-06-03 | End: 2022-06-03 | Stop reason: ALTCHOICE

## 2022-06-03 RX ORDER — CLINDAMYCIN PHOSPHATE 10 MG/G
GEL TOPICAL 2 TIMES DAILY
COMMUNITY

## 2022-06-03 RX ORDER — CITALOPRAM 20 MG/1
40 TABLET ORAL DAILY
Status: DISCONTINUED | OUTPATIENT
Start: 2022-06-03 | End: 2022-06-10 | Stop reason: HOSPADM

## 2022-06-03 RX ORDER — CHOLESTYRAMINE LIGHT 4 G/5.7G
4 POWDER, FOR SUSPENSION ORAL DAILY
Status: DISCONTINUED | OUTPATIENT
Start: 2022-06-03 | End: 2022-06-10 | Stop reason: HOSPADM

## 2022-06-03 RX ORDER — ACETAMINOPHEN 325 MG/1
650 TABLET ORAL EVERY 4 HOURS PRN
Status: DISCONTINUED | OUTPATIENT
Start: 2022-06-03 | End: 2022-06-10 | Stop reason: HOSPADM

## 2022-06-03 RX ORDER — ALBUTEROL SULFATE 90 UG/1
2 AEROSOL, METERED RESPIRATORY (INHALATION) 4 TIMES DAILY
Status: ON HOLD | COMMUNITY
End: 2022-06-10 | Stop reason: SDUPTHER

## 2022-06-03 RX ORDER — PANTOPRAZOLE SODIUM 40 MG/1
40 TABLET, DELAYED RELEASE ORAL DAILY
Status: ON HOLD | COMMUNITY
End: 2022-06-10 | Stop reason: SDUPTHER

## 2022-06-03 RX ORDER — GABAPENTIN 100 MG/1
100 CAPSULE ORAL 3 TIMES DAILY
Status: ON HOLD | COMMUNITY
End: 2022-06-10 | Stop reason: SDUPTHER

## 2022-06-03 RX ORDER — LEVOTHYROXINE SODIUM 0.05 MG/1
50 TABLET ORAL DAILY
COMMUNITY

## 2022-06-03 RX ORDER — LOSARTAN POTASSIUM 25 MG/1
25 TABLET ORAL DAILY
Status: DISCONTINUED | OUTPATIENT
Start: 2022-06-03 | End: 2022-06-10 | Stop reason: HOSPADM

## 2022-06-03 RX ORDER — LANOLIN ALCOHOL/MO/W.PET/CERES
3 CREAM (GRAM) TOPICAL NIGHTLY PRN
Status: ON HOLD | COMMUNITY
End: 2022-06-10 | Stop reason: HOSPADM

## 2022-06-03 RX ORDER — ARFORMOTEROL TARTRATE 15 UG/2ML
15 SOLUTION RESPIRATORY (INHALATION) 2 TIMES DAILY
Status: DISCONTINUED | OUTPATIENT
Start: 2022-06-04 | End: 2022-06-10 | Stop reason: HOSPADM

## 2022-06-03 RX ORDER — METHOCARBAMOL 500 MG/1
500 TABLET, FILM COATED ORAL 3 TIMES DAILY
Status: ON HOLD | COMMUNITY
End: 2022-06-10 | Stop reason: SDUPTHER

## 2022-06-03 RX ORDER — GABAPENTIN 100 MG/1
100 CAPSULE ORAL 3 TIMES DAILY
Status: DISCONTINUED | OUTPATIENT
Start: 2022-06-03 | End: 2022-06-10 | Stop reason: HOSPADM

## 2022-06-03 RX ORDER — IRBESARTAN 75 MG/1
75 TABLET ORAL DAILY
COMMUNITY

## 2022-06-03 RX ORDER — LEVOTHYROXINE SODIUM 0.05 MG/1
50 TABLET ORAL DAILY
Status: DISCONTINUED | OUTPATIENT
Start: 2022-06-04 | End: 2022-06-10 | Stop reason: HOSPADM

## 2022-06-03 RX ORDER — ACETAMINOPHEN 500 MG
1000 TABLET ORAL EVERY 8 HOURS
COMMUNITY

## 2022-06-03 RX ORDER — OXYCODONE HYDROCHLORIDE 5 MG/1
5 TABLET ORAL EVERY 4 HOURS PRN
Status: ON HOLD | COMMUNITY
End: 2022-06-10 | Stop reason: HOSPADM

## 2022-06-03 RX ORDER — OXYCODONE HYDROCHLORIDE 5 MG/1
5 TABLET ORAL EVERY 4 HOURS PRN
Status: DISCONTINUED | OUTPATIENT
Start: 2022-06-03 | End: 2022-06-10 | Stop reason: HOSPADM

## 2022-06-03 RX ORDER — SENNA AND DOCUSATE SODIUM 50; 8.6 MG/1; MG/1
2 TABLET, FILM COATED ORAL 2 TIMES DAILY
Status: DISCONTINUED | OUTPATIENT
Start: 2022-06-03 | End: 2022-06-10 | Stop reason: HOSPADM

## 2022-06-03 RX ORDER — FLUTICASONE FUROATE AND VILANTEROL 100; 25 UG/1; UG/1
1 POWDER RESPIRATORY (INHALATION) DAILY
Status: ON HOLD | COMMUNITY
Start: 2022-06-04 | End: 2022-06-10 | Stop reason: SDUPTHER

## 2022-06-03 RX ORDER — POLYETHYLENE GLYCOL 3350 17 G/17G
17 POWDER, FOR SOLUTION ORAL DAILY
Status: ON HOLD | COMMUNITY
Start: 2022-06-04 | End: 2022-06-10 | Stop reason: HOSPADM

## 2022-06-03 RX ORDER — BUDESONIDE 0.25 MG/2ML
0.25 INHALANT ORAL 2 TIMES DAILY
Status: DISCONTINUED | OUTPATIENT
Start: 2022-06-04 | End: 2022-06-10 | Stop reason: HOSPADM

## 2022-06-03 RX ORDER — SENNA AND DOCUSATE SODIUM 50; 8.6 MG/1; MG/1
2 TABLET, FILM COATED ORAL 2 TIMES DAILY
Status: ON HOLD | COMMUNITY
End: 2022-06-10 | Stop reason: SDUPTHER

## 2022-06-03 RX ORDER — CETIRIZINE HYDROCHLORIDE 10 MG/1
10 TABLET ORAL NIGHTLY
COMMUNITY

## 2022-06-03 RX ORDER — ACETAMINOPHEN 500 MG
1000 TABLET ORAL EVERY 8 HOURS
Status: DISCONTINUED | OUTPATIENT
Start: 2022-06-03 | End: 2022-06-10 | Stop reason: HOSPADM

## 2022-06-03 RX ORDER — POLYETHYLENE GLYCOL 3350 17 G/17G
17 POWDER, FOR SOLUTION ORAL DAILY
Status: DISCONTINUED | OUTPATIENT
Start: 2022-06-04 | End: 2022-06-10 | Stop reason: HOSPADM

## 2022-06-03 RX ORDER — GINSENG 100 MG
CAPSULE ORAL 2 TIMES DAILY
Status: DISCONTINUED | OUTPATIENT
Start: 2022-06-03 | End: 2022-06-03 | Stop reason: ALTCHOICE

## 2022-06-03 RX ORDER — ALLOPURINOL 100 MG/1
100 TABLET ORAL DAILY
Status: DISCONTINUED | OUTPATIENT
Start: 2022-06-03 | End: 2022-06-10 | Stop reason: HOSPADM

## 2022-06-03 RX ORDER — PANTOPRAZOLE SODIUM 40 MG/1
40 TABLET, DELAYED RELEASE ORAL DAILY
Status: DISCONTINUED | OUTPATIENT
Start: 2022-06-03 | End: 2022-06-10 | Stop reason: HOSPADM

## 2022-06-03 RX ORDER — METHOCARBAMOL 500 MG/1
500 TABLET, FILM COATED ORAL 3 TIMES DAILY
Status: DISCONTINUED | OUTPATIENT
Start: 2022-06-03 | End: 2022-06-10 | Stop reason: HOSPADM

## 2022-06-03 RX ORDER — BISACODYL 10 MG
10 SUPPOSITORY, RECTAL RECTAL DAILY PRN
Status: DISCONTINUED | OUTPATIENT
Start: 2022-06-03 | End: 2022-06-10 | Stop reason: HOSPADM

## 2022-06-03 RX ORDER — BACITRACIN 500 [USP'U]/G
OINTMENT OPHTHALMIC EVERY 4 HOURS
COMMUNITY
End: 2022-06-03

## 2022-06-03 RX ORDER — ALBUTEROL SULFATE 2.5 MG/3ML
2.5 SOLUTION RESPIRATORY (INHALATION) 4 TIMES DAILY
Status: DISCONTINUED | OUTPATIENT
Start: 2022-06-03 | End: 2022-06-10 | Stop reason: HOSPADM

## 2022-06-03 RX ORDER — SENNA AND DOCUSATE SODIUM 50; 8.6 MG/1; MG/1
1 TABLET, FILM COATED ORAL 2 TIMES DAILY
Status: DISCONTINUED | OUTPATIENT
Start: 2022-06-03 | End: 2022-06-03 | Stop reason: CLARIF

## 2022-06-03 RX ORDER — LANOLIN ALCOHOL/MO/W.PET/CERES
3 CREAM (GRAM) TOPICAL NIGHTLY PRN
Status: DISCONTINUED | OUTPATIENT
Start: 2022-06-03 | End: 2022-06-10 | Stop reason: HOSPADM

## 2022-06-03 RX ORDER — BUDESONIDE AND FORMOTEROL FUMARATE DIHYDRATE 80; 4.5 UG/1; UG/1
2 AEROSOL RESPIRATORY (INHALATION) 2 TIMES DAILY
Status: DISCONTINUED | OUTPATIENT
Start: 2022-06-04 | End: 2022-06-03 | Stop reason: CLARIF

## 2022-06-03 RX ORDER — CETIRIZINE HYDROCHLORIDE 10 MG/1
10 TABLET ORAL NIGHTLY
Status: DISCONTINUED | OUTPATIENT
Start: 2022-06-03 | End: 2022-06-10 | Stop reason: HOSPADM

## 2022-06-03 RX ADMIN — ALBUTEROL SULFATE 2.5 MG: 2.5 SOLUTION RESPIRATORY (INHALATION) at 20:43

## 2022-06-03 RX ADMIN — GABAPENTIN 100 MG: 100 CAPSULE ORAL at 21:54

## 2022-06-03 RX ADMIN — SENNOSIDES AND DOCUSATE SODIUM 2 TABLET: 50; 8.6 TABLET ORAL at 21:53

## 2022-06-03 RX ADMIN — ROPINIROLE HYDROCHLORIDE 0.5 MG: 0.25 TABLET, FILM COATED ORAL at 21:54

## 2022-06-03 RX ADMIN — CETIRIZINE HYDROCHLORIDE 10 MG: 10 TABLET, FILM COATED ORAL at 21:55

## 2022-06-03 RX ADMIN — ACETAMINOPHEN 1000 MG: 500 TABLET ORAL at 21:54

## 2022-06-03 RX ADMIN — METHOCARBAMOL 500 MG: 500 TABLET ORAL at 21:55

## 2022-06-03 ASSESSMENT — PAIN DESCRIPTION - ONSET: ONSET: ON-GOING

## 2022-06-03 ASSESSMENT — PAIN SCALES - GENERAL
PAINLEVEL_OUTOF10: 1
PAINLEVEL_OUTOF10: 2
PAINLEVEL_OUTOF10: 0

## 2022-06-03 ASSESSMENT — PAIN DESCRIPTION - ORIENTATION: ORIENTATION: LEFT

## 2022-06-03 ASSESSMENT — PAIN DESCRIPTION - DESCRIPTORS: DESCRIPTORS: ACHING;DISCOMFORT

## 2022-06-03 ASSESSMENT — PAIN - FUNCTIONAL ASSESSMENT: PAIN_FUNCTIONAL_ASSESSMENT: PREVENTS OR INTERFERES SOME ACTIVE ACTIVITIES AND ADLS

## 2022-06-03 ASSESSMENT — PAIN DESCRIPTION - LOCATION: LOCATION: ARM

## 2022-06-03 ASSESSMENT — PAIN DESCRIPTION - FREQUENCY: FREQUENCY: CONTINUOUS

## 2022-06-03 ASSESSMENT — PAIN DESCRIPTION - PAIN TYPE: TYPE: ACUTE PAIN
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
## 2022-06-04 PROCEDURE — 97165 OT EVAL LOW COMPLEX 30 MIN: CPT

## 2022-06-04 PROCEDURE — 6370000000 HC RX 637 (ALT 250 FOR IP): Performed by: PSYCHIATRY & NEUROLOGY

## 2022-06-04 PROCEDURE — 97116 GAIT TRAINING THERAPY: CPT

## 2022-06-04 PROCEDURE — 6360000002 HC RX W HCPCS: Performed by: PSYCHIATRY & NEUROLOGY

## 2022-06-04 PROCEDURE — 99223 1ST HOSP IP/OBS HIGH 75: CPT | Performed by: PSYCHIATRY & NEUROLOGY

## 2022-06-04 PROCEDURE — 97535 SELF CARE MNGMENT TRAINING: CPT

## 2022-06-04 PROCEDURE — 97110 THERAPEUTIC EXERCISES: CPT

## 2022-06-04 PROCEDURE — 97161 PT EVAL LOW COMPLEX 20 MIN: CPT

## 2022-06-04 PROCEDURE — 97530 THERAPEUTIC ACTIVITIES: CPT

## 2022-06-04 PROCEDURE — 1180000000 HC REHAB R&B

## 2022-06-04 PROCEDURE — 94640 AIRWAY INHALATION TREATMENT: CPT

## 2022-06-04 RX ADMIN — ALLOPURINOL 100 MG: 100 TABLET ORAL at 09:25

## 2022-06-04 RX ADMIN — ALBUTEROL SULFATE 2.5 MG: 2.5 SOLUTION RESPIRATORY (INHALATION) at 18:10

## 2022-06-04 RX ADMIN — ARFORMOTEROL TARTRATE 15 MCG: 15 SOLUTION RESPIRATORY (INHALATION) at 06:19

## 2022-06-04 RX ADMIN — SENNOSIDES AND DOCUSATE SODIUM 2 TABLET: 50; 8.6 TABLET ORAL at 09:24

## 2022-06-04 RX ADMIN — PANTOPRAZOLE SODIUM 40 MG: 40 TABLET, DELAYED RELEASE ORAL at 09:25

## 2022-06-04 RX ADMIN — GABAPENTIN 100 MG: 100 CAPSULE ORAL at 14:37

## 2022-06-04 RX ADMIN — ALBUTEROL SULFATE 2.5 MG: 2.5 SOLUTION RESPIRATORY (INHALATION) at 15:05

## 2022-06-04 RX ADMIN — METHOCARBAMOL 500 MG: 500 TABLET ORAL at 14:37

## 2022-06-04 RX ADMIN — ACETAMINOPHEN 1000 MG: 500 TABLET ORAL at 20:36

## 2022-06-04 RX ADMIN — METHOCARBAMOL 500 MG: 500 TABLET ORAL at 20:37

## 2022-06-04 RX ADMIN — ARFORMOTEROL TARTRATE 15 MCG: 15 SOLUTION RESPIRATORY (INHALATION) at 18:23

## 2022-06-04 RX ADMIN — METHOCARBAMOL 500 MG: 500 TABLET ORAL at 09:24

## 2022-06-04 RX ADMIN — CITALOPRAM HYDROBROMIDE 40 MG: 20 TABLET ORAL at 09:24

## 2022-06-04 RX ADMIN — LEVOTHYROXINE SODIUM 50 MCG: 50 TABLET ORAL at 05:23

## 2022-06-04 RX ADMIN — CHOLESTYRAMINE 4 G: 4 POWDER, FOR SUSPENSION ORAL at 09:23

## 2022-06-04 RX ADMIN — CETIRIZINE HYDROCHLORIDE 10 MG: 10 TABLET, FILM COATED ORAL at 20:37

## 2022-06-04 RX ADMIN — GABAPENTIN 100 MG: 100 CAPSULE ORAL at 09:24

## 2022-06-04 RX ADMIN — BUDESONIDE 250 MCG: 0.25 SUSPENSION RESPIRATORY (INHALATION) at 06:19

## 2022-06-04 RX ADMIN — ALBUTEROL SULFATE 2.5 MG: 2.5 SOLUTION RESPIRATORY (INHALATION) at 06:19

## 2022-06-04 RX ADMIN — ACETAMINOPHEN 1000 MG: 500 TABLET ORAL at 13:12

## 2022-06-04 RX ADMIN — BUDESONIDE 250 MCG: 0.25 SUSPENSION RESPIRATORY (INHALATION) at 18:23

## 2022-06-04 RX ADMIN — LOSARTAN POTASSIUM 25 MG: 25 TABLET, FILM COATED ORAL at 09:25

## 2022-06-04 RX ADMIN — GABAPENTIN 100 MG: 100 CAPSULE ORAL at 20:37

## 2022-06-04 RX ADMIN — ACETAMINOPHEN 1000 MG: 500 TABLET ORAL at 05:23

## 2022-06-04 RX ADMIN — ROPINIROLE HYDROCHLORIDE 0.5 MG: 0.25 TABLET, FILM COATED ORAL at 20:36

## 2022-06-04 ASSESSMENT — PAIN SCALES - GENERAL
PAINLEVEL_OUTOF10: 1
PAINLEVEL_OUTOF10: 2
PAINLEVEL_OUTOF10: 3

## 2022-06-04 ASSESSMENT — PAIN DESCRIPTION - DESCRIPTORS
DESCRIPTORS: ACHING;DISCOMFORT
DESCRIPTORS: ACHING
DESCRIPTORS: DISCOMFORT
DESCRIPTORS: ACHING
DESCRIPTORS: DISCOMFORT
DESCRIPTORS: ACHING

## 2022-06-04 ASSESSMENT — PAIN DESCRIPTION - LOCATION
LOCATION: ARM
LOCATION: RIB CAGE
LOCATION: ARM
LOCATION: ARM

## 2022-06-04 ASSESSMENT — PAIN DESCRIPTION - ORIENTATION
ORIENTATION: LEFT
ORIENTATION: RIGHT

## 2022-06-04 ASSESSMENT — PAIN - FUNCTIONAL ASSESSMENT
PAIN_FUNCTIONAL_ASSESSMENT: ACTIVITIES ARE NOT PREVENTED
PAIN_FUNCTIONAL_ASSESSMENT: PREVENTS OR INTERFERES SOME ACTIVE ACTIVITIES AND ADLS
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
## 2022-06-04 NOTE — PLAN OF CARE
Problem: Discharge Planning  Goal: Discharge to home or other facility with appropriate resources  Outcome: Progressing     Problem: Safety - Adult  Goal: Free from fall injury  Outcome: Progressing  Flowsheets (Taken 6/4/2022 1546)  Free From Fall Injury: Instruct family/caregiver on patient safety     Problem: ABCDS Injury Assessment  Goal: Absence of physical injury  Outcome: Progressing  Flowsheets (Taken 6/4/2022 1549)  Absence of Physical Injury: Implement safety measures based on patient assessment     Problem: Pain  Goal: Verbalizes/displays adequate comfort level or baseline comfort level  Outcome: Progressing
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
## 2022-06-04 NOTE — PLAN OF CARE
Problem: Discharge Planning  Goal: Discharge to home or other facility with appropriate resources  Outcome: Progressing  Flowsheets (Taken 6/3/2022 1824 by Daniele Mooney RN)  Discharge to home or other facility with appropriate resources: Identify barriers to discharge with patient and caregiver     Problem: Safety - Adult  Goal: Free from fall injury  Outcome: Progressing     Problem: ABCDS Injury Assessment  Goal: Absence of physical injury  Outcome: Progressing     Problem: Pain  Goal: Verbalizes/displays adequate comfort level or baseline comfort level  Outcome: Progressing
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
## 2022-06-04 NOTE — PROGRESS NOTES
06/04/22 1300   Restrictions/Precautions   Restrictions/Precautions Surgical Protocols; Fall Risk;Weight Bearing   Required Braces or Orthoses? Yes   Upper Extremity Weight Bearing Restrictions   Left Upper Extremity Weight Bearing Non Weight Bearing   Required Braces or Orthoses   Spinal Thoracic Lumbar Sacral Orthotics   Left Upper Extremity Brace/Splint volar splint   Subjective   Subjective Patient resting in bed, does report some increased pain along R rib cage. Pain Assessment   Pain Assessment 0-10   Pain Level 3   Patient's Stated Pain Goal 0 - No pain   Pain Location Rib cage   Pain Orientation Right   Pain Descriptors Aching   Non-Pharmaceutical Pain Intervention(s) Distraction   Bed Mobility   Supine to Sit Stand by assistance  (HOB elevated )   Transfers   Sit to Stand Stand by assistance   Stand to sit Stand by assistance   Bed to Chair Stand by assistance   Ambulation   Surface level tile   Device No Device   Other Apparatus Wheelchair follow   Assistance Contact guard assistance   Quality of Gait small steps observed, able to maintain reciprocal pattern   (Increased forward flexed posture as she fatigued )   Gait Deviations Slow Lisa;Decreased step length;Decreased step height   Propulsion 1   Distance 60'   Balance   Comments Patient able to stand 5 minutes while performing dynamic activity with RUE    Exercises   Hip Flexion seated; 2 x 10    Hip Abduction seated; 2 x 10   (light manual resistance )   Knee Long Arc Quad 2 x 10 with light manual resistance    Comments Seated heel raises 2 x 10    Conditions Requiring Skilled Therapeutic Intervention   Body Structures, Functions, Activity Limitations Requiring Skilled Therapeutic Intervention Decreased functional mobility ; Decreased strength;Decreased endurance;Decreased balance; Increased pain   Assessment Increased pain and fatigue this afternoon, likely related to increased activity level this morning.  Donned jean carlos TLSO which helped with pain and allowed for increased participation. Primary limitation is decreased endurance. Plan   Current Treatment Recommendations Strengthening;Balance training;Functional mobility training;Transfer training; Endurance training; Wheelchair mobility training;Gait training;Stair training; Safety education & training   Safety Devices   Type of Devices All fall risk precautions in place;Call light within reach;Gait belt;Left in chair  ( present )
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
## 2022-06-04 NOTE — PROGRESS NOTES
4 Eyes Skin Assessment    Francisco Maldonado is being assessed upon: Admission    I agree that Mary Winslow RN, along with Skye Thompson have performed a thorough Head to Toe Skin Assessment on the patient. ALL assessment sites listed below have been assessed. Areas assessed by both nurses:     [x]   Head, Face, and Ears   [x]   Shoulders, Back, and Chest  [x]   Arms, Elbows, and Hands   [x]   Coccyx, Sacrum, and Ischium  [x]   Legs, Feet, and Heels    Does the Patient have Skin Breakdown?  No    Francisco Prevention initiated: Yes  Wound Care Orders initiated: No    [facility] nurse consulted for Pressure Injury (Stage 3,4, Unstageable, DTI, NWPT, and Complex wounds) and New or Established Ostomies: No        Primary Nurse eSignature: Drea Nobles RN on 6/3/2022 at 10:26 PM      Co-Signer eSignature: Electronically signed by Deepthi Gonzalez LPN on 0/5/9450 at 17:38 PM

## 2022-06-05 PROCEDURE — 1180000000 HC REHAB R&B

## 2022-06-05 PROCEDURE — 94640 AIRWAY INHALATION TREATMENT: CPT

## 2022-06-05 PROCEDURE — 6360000002 HC RX W HCPCS: Performed by: PSYCHIATRY & NEUROLOGY

## 2022-06-05 PROCEDURE — 6370000000 HC RX 637 (ALT 250 FOR IP): Performed by: PSYCHIATRY & NEUROLOGY

## 2022-06-05 RX ADMIN — LOSARTAN POTASSIUM 25 MG: 25 TABLET, FILM COATED ORAL at 07:45

## 2022-06-05 RX ADMIN — ALBUTEROL SULFATE 2.5 MG: 2.5 SOLUTION RESPIRATORY (INHALATION) at 18:11

## 2022-06-05 RX ADMIN — ARFORMOTEROL TARTRATE 15 MCG: 15 SOLUTION RESPIRATORY (INHALATION) at 07:01

## 2022-06-05 RX ADMIN — CITALOPRAM HYDROBROMIDE 40 MG: 20 TABLET ORAL at 07:44

## 2022-06-05 RX ADMIN — METHOCARBAMOL 500 MG: 500 TABLET ORAL at 22:15

## 2022-06-05 RX ADMIN — SENNOSIDES AND DOCUSATE SODIUM 2 TABLET: 50; 8.6 TABLET ORAL at 22:15

## 2022-06-05 RX ADMIN — BUDESONIDE 250 MCG: 0.25 SUSPENSION RESPIRATORY (INHALATION) at 07:01

## 2022-06-05 RX ADMIN — METHOCARBAMOL 500 MG: 500 TABLET ORAL at 13:22

## 2022-06-05 RX ADMIN — GABAPENTIN 100 MG: 100 CAPSULE ORAL at 22:15

## 2022-06-05 RX ADMIN — GABAPENTIN 100 MG: 100 CAPSULE ORAL at 13:22

## 2022-06-05 RX ADMIN — ACETAMINOPHEN 1000 MG: 500 TABLET ORAL at 12:05

## 2022-06-05 RX ADMIN — GABAPENTIN 100 MG: 100 CAPSULE ORAL at 07:44

## 2022-06-05 RX ADMIN — BUDESONIDE 250 MCG: 0.25 SUSPENSION RESPIRATORY (INHALATION) at 18:21

## 2022-06-05 RX ADMIN — LEVOTHYROXINE SODIUM 50 MCG: 50 TABLET ORAL at 05:47

## 2022-06-05 RX ADMIN — Medication 3 MG: at 22:20

## 2022-06-05 RX ADMIN — METHOCARBAMOL 500 MG: 500 TABLET ORAL at 07:44

## 2022-06-05 RX ADMIN — ALBUTEROL SULFATE 2.5 MG: 2.5 SOLUTION RESPIRATORY (INHALATION) at 15:41

## 2022-06-05 RX ADMIN — ROPINIROLE HYDROCHLORIDE 0.5 MG: 0.25 TABLET, FILM COATED ORAL at 22:14

## 2022-06-05 RX ADMIN — ALLOPURINOL 100 MG: 100 TABLET ORAL at 07:44

## 2022-06-05 RX ADMIN — SENNOSIDES AND DOCUSATE SODIUM 2 TABLET: 50; 8.6 TABLET ORAL at 07:44

## 2022-06-05 RX ADMIN — ALBUTEROL SULFATE 2.5 MG: 2.5 SOLUTION RESPIRATORY (INHALATION) at 06:54

## 2022-06-05 RX ADMIN — ARFORMOTEROL TARTRATE 15 MCG: 15 SOLUTION RESPIRATORY (INHALATION) at 18:21

## 2022-06-05 RX ADMIN — ALBUTEROL SULFATE 2.5 MG: 2.5 SOLUTION RESPIRATORY (INHALATION) at 10:28

## 2022-06-05 RX ADMIN — ACETAMINOPHEN 1000 MG: 500 TABLET ORAL at 22:20

## 2022-06-05 RX ADMIN — CETIRIZINE HYDROCHLORIDE 10 MG: 10 TABLET, FILM COATED ORAL at 22:15

## 2022-06-05 RX ADMIN — ACETAMINOPHEN 1000 MG: 500 TABLET ORAL at 03:19

## 2022-06-05 RX ADMIN — PANTOPRAZOLE SODIUM 40 MG: 40 TABLET, DELAYED RELEASE ORAL at 07:45

## 2022-06-05 ASSESSMENT — PAIN DESCRIPTION - ORIENTATION
ORIENTATION: UPPER
ORIENTATION: POSTERIOR
ORIENTATION: UPPER
ORIENTATION: LOWER
ORIENTATION: POSTERIOR

## 2022-06-05 ASSESSMENT — PAIN SCALES - GENERAL
PAINLEVEL_OUTOF10: 2
PAINLEVEL_OUTOF10: 0
PAINLEVEL_OUTOF10: 0
PAINLEVEL_OUTOF10: 1
PAINLEVEL_OUTOF10: 0
PAINLEVEL_OUTOF10: 1

## 2022-06-05 ASSESSMENT — PAIN DESCRIPTION - LOCATION
LOCATION: HEAD
LOCATION: BACK
LOCATION: HEAD
LOCATION: BACK
LOCATION: BACK

## 2022-06-05 ASSESSMENT — PAIN DESCRIPTION - DESCRIPTORS
DESCRIPTORS: ACHING
DESCRIPTORS: ACHING
DESCRIPTORS: DISCOMFORT
DESCRIPTORS: DISCOMFORT
DESCRIPTORS: ACHING

## 2022-06-05 ASSESSMENT — PAIN - FUNCTIONAL ASSESSMENT
PAIN_FUNCTIONAL_ASSESSMENT: ACTIVITIES ARE NOT PREVENTED

## 2022-06-05 NOTE — PLAN OF CARE
Problem: Discharge Planning  Goal: Discharge to home or other facility with appropriate resources  6/4/2022 2341 by Marshall Alvarado RN  Outcome: Progressing  Flowsheets (Taken 6/4/2022 2030)  Discharge to home or other facility with appropriate resources: Identify barriers to discharge with patient and caregiver  6/4/2022 1549 by Bill Tena RN  Outcome: Progressing     Problem: Safety - Adult  Goal: Free from fall injury  6/4/2022 2341 by Marshall Alvarado RN  Outcome: Progressing  6/4/2022 1549 by Bill Tena RN  Outcome: Progressing  Flowsheets (Taken 6/4/2022 1548)  Free From Fall Injury: Instruct family/caregiver on patient safety     Problem: ABCDS Injury Assessment  Goal: Absence of physical injury  6/4/2022 2341 by Marshall Alvarado RN  Outcome: Progressing  6/4/2022 1549 by Bill Tena RN  Outcome: Progressing  Flowsheets (Taken 6/4/2022 1548)  Absence of Physical Injury: Implement safety measures based on patient assessment     Problem: Pain  Goal: Verbalizes/displays adequate comfort level or baseline comfort level  6/4/2022 2341 by Marshall Alvarado RN  Outcome: Progressing  6/4/2022 1549 by Bill Tena RN  Outcome: Progressing

## 2022-06-06 LAB
ANION GAP SERPL CALCULATED.3IONS-SCNC: 14 MMOL/L (ref 7–19)
BASOPHILS ABSOLUTE: 0 K/UL (ref 0–0.2)
BASOPHILS RELATIVE PERCENT: 0.4 % (ref 0–1)
BUN BLDV-MCNC: 22 MG/DL (ref 8–23)
CALCIUM SERPL-MCNC: 9.7 MG/DL (ref 8.8–10.2)
CHLORIDE BLD-SCNC: 101 MMOL/L (ref 98–111)
CO2: 23 MMOL/L (ref 22–29)
CREAT SERPL-MCNC: 0.7 MG/DL (ref 0.5–0.9)
EOSINOPHILS ABSOLUTE: 0.4 K/UL (ref 0–0.6)
EOSINOPHILS RELATIVE PERCENT: 4.8 % (ref 0–5)
GFR AFRICAN AMERICAN: >59
GFR NON-AFRICAN AMERICAN: >60
GLUCOSE BLD-MCNC: 110 MG/DL (ref 74–109)
HCT VFR BLD CALC: 34.6 % (ref 37–47)
HEMOGLOBIN: 10.3 G/DL (ref 12–16)
IMMATURE GRANULOCYTES #: 0.1 K/UL
LYMPHOCYTES ABSOLUTE: 1.6 K/UL (ref 1.1–4.5)
LYMPHOCYTES RELATIVE PERCENT: 17.9 % (ref 20–40)
MCH RBC QN AUTO: 26.3 PG (ref 27–31)
MCHC RBC AUTO-ENTMCNC: 29.8 G/DL (ref 33–37)
MCV RBC AUTO: 88.3 FL (ref 81–99)
MONOCYTES ABSOLUTE: 0.6 K/UL (ref 0–0.9)
MONOCYTES RELATIVE PERCENT: 6.1 % (ref 0–10)
NEUTROPHILS ABSOLUTE: 6.3 K/UL (ref 1.5–7.5)
NEUTROPHILS RELATIVE PERCENT: 69.8 % (ref 50–65)
PDW BLD-RTO: 16.8 % (ref 11.5–14.5)
PLATELET # BLD: 369 K/UL (ref 130–400)
PMV BLD AUTO: 9.9 FL (ref 9.4–12.3)
POTASSIUM REFLEX MAGNESIUM: 5.1 MMOL/L (ref 3.5–5)
RBC # BLD: 3.92 M/UL (ref 4.2–5.4)
SODIUM BLD-SCNC: 138 MMOL/L (ref 136–145)
WBC # BLD: 9.1 K/UL (ref 4.8–10.8)

## 2022-06-06 PROCEDURE — 80048 BASIC METABOLIC PNL TOTAL CA: CPT

## 2022-06-06 PROCEDURE — 99232 SBSQ HOSP IP/OBS MODERATE 35: CPT | Performed by: PSYCHIATRY & NEUROLOGY

## 2022-06-06 PROCEDURE — 6370000000 HC RX 637 (ALT 250 FOR IP): Performed by: PSYCHIATRY & NEUROLOGY

## 2022-06-06 PROCEDURE — 97530 THERAPEUTIC ACTIVITIES: CPT

## 2022-06-06 PROCEDURE — 36415 COLL VENOUS BLD VENIPUNCTURE: CPT

## 2022-06-06 PROCEDURE — 94640 AIRWAY INHALATION TREATMENT: CPT

## 2022-06-06 PROCEDURE — 85025 COMPLETE CBC W/AUTO DIFF WBC: CPT

## 2022-06-06 PROCEDURE — 1180000000 HC REHAB R&B

## 2022-06-06 PROCEDURE — 97116 GAIT TRAINING THERAPY: CPT

## 2022-06-06 PROCEDURE — 6360000002 HC RX W HCPCS: Performed by: PSYCHIATRY & NEUROLOGY

## 2022-06-06 PROCEDURE — 97110 THERAPEUTIC EXERCISES: CPT

## 2022-06-06 RX ADMIN — ACETAMINOPHEN 1000 MG: 500 TABLET ORAL at 04:21

## 2022-06-06 RX ADMIN — ACETAMINOPHEN 1000 MG: 500 TABLET ORAL at 12:06

## 2022-06-06 RX ADMIN — ARFORMOTEROL TARTRATE 15 MCG: 15 SOLUTION RESPIRATORY (INHALATION) at 17:03

## 2022-06-06 RX ADMIN — LOSARTAN POTASSIUM 25 MG: 25 TABLET, FILM COATED ORAL at 07:32

## 2022-06-06 RX ADMIN — ROPINIROLE HYDROCHLORIDE 0.5 MG: 0.25 TABLET, FILM COATED ORAL at 20:49

## 2022-06-06 RX ADMIN — PANTOPRAZOLE SODIUM 40 MG: 40 TABLET, DELAYED RELEASE ORAL at 07:32

## 2022-06-06 RX ADMIN — CHOLESTYRAMINE 4 G: 4 POWDER, FOR SUSPENSION ORAL at 07:31

## 2022-06-06 RX ADMIN — CETIRIZINE HYDROCHLORIDE 10 MG: 10 TABLET, FILM COATED ORAL at 20:48

## 2022-06-06 RX ADMIN — BUDESONIDE 250 MCG: 0.25 SUSPENSION RESPIRATORY (INHALATION) at 17:05

## 2022-06-06 RX ADMIN — ALBUTEROL SULFATE 2.5 MG: 2.5 SOLUTION RESPIRATORY (INHALATION) at 06:29

## 2022-06-06 RX ADMIN — ARFORMOTEROL TARTRATE 15 MCG: 15 SOLUTION RESPIRATORY (INHALATION) at 06:29

## 2022-06-06 RX ADMIN — METHOCARBAMOL 500 MG: 500 TABLET ORAL at 20:48

## 2022-06-06 RX ADMIN — SENNOSIDES AND DOCUSATE SODIUM 2 TABLET: 50; 8.6 TABLET ORAL at 07:32

## 2022-06-06 RX ADMIN — ALLOPURINOL 100 MG: 100 TABLET ORAL at 07:32

## 2022-06-06 RX ADMIN — GABAPENTIN 100 MG: 100 CAPSULE ORAL at 07:32

## 2022-06-06 RX ADMIN — Medication 3 MG: at 20:51

## 2022-06-06 RX ADMIN — ACETAMINOPHEN 1000 MG: 500 TABLET ORAL at 20:48

## 2022-06-06 RX ADMIN — ALBUTEROL SULFATE 2.5 MG: 2.5 SOLUTION RESPIRATORY (INHALATION) at 14:28

## 2022-06-06 RX ADMIN — CITALOPRAM HYDROBROMIDE 40 MG: 20 TABLET ORAL at 07:32

## 2022-06-06 RX ADMIN — SENNOSIDES AND DOCUSATE SODIUM 2 TABLET: 50; 8.6 TABLET ORAL at 20:49

## 2022-06-06 RX ADMIN — ALBUTEROL SULFATE 2.5 MG: 2.5 SOLUTION RESPIRATORY (INHALATION) at 18:38

## 2022-06-06 RX ADMIN — GABAPENTIN 100 MG: 100 CAPSULE ORAL at 14:00

## 2022-06-06 RX ADMIN — BUDESONIDE 250 MCG: 0.25 SUSPENSION RESPIRATORY (INHALATION) at 06:29

## 2022-06-06 RX ADMIN — METHOCARBAMOL 500 MG: 500 TABLET ORAL at 07:32

## 2022-06-06 RX ADMIN — GABAPENTIN 100 MG: 100 CAPSULE ORAL at 20:49

## 2022-06-06 RX ADMIN — METHOCARBAMOL 500 MG: 500 TABLET ORAL at 14:00

## 2022-06-06 RX ADMIN — LEVOTHYROXINE SODIUM 50 MCG: 50 TABLET ORAL at 05:58

## 2022-06-06 ASSESSMENT — PAIN DESCRIPTION - LOCATION
LOCATION: SHOULDER
LOCATION: SHOULDER;BACK

## 2022-06-06 ASSESSMENT — PAIN DESCRIPTION - DESCRIPTORS
DESCRIPTORS: ACHING

## 2022-06-06 ASSESSMENT — PAIN DESCRIPTION - ORIENTATION
ORIENTATION: RIGHT;LEFT
ORIENTATION: RIGHT;LEFT;UPPER
ORIENTATION: RIGHT;LEFT
ORIENTATION: RIGHT

## 2022-06-06 ASSESSMENT — PAIN SCALES - GENERAL
PAINLEVEL_OUTOF10: 1
PAINLEVEL_OUTOF10: 0
PAINLEVEL_OUTOF10: 1
PAINLEVEL_OUTOF10: 0
PAINLEVEL_OUTOF10: 1
PAINLEVEL_OUTOF10: 1

## 2022-06-06 ASSESSMENT — PAIN - FUNCTIONAL ASSESSMENT
PAIN_FUNCTIONAL_ASSESSMENT: ACTIVITIES ARE NOT PREVENTED
PAIN_FUNCTIONAL_ASSESSMENT: ACTIVITIES ARE NOT PREVENTED
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
## 2022-06-06 NOTE — PROGRESS NOTES
06/06/22 1000   Restrictions/Precautions   Restrictions/Precautions Surgical Protocols; Fall Risk;Weight Bearing   Required Braces or Orthoses? Yes   Upper Extremity Weight Bearing Restrictions   Left Upper Extremity Weight Bearing Non Weight Bearing   Required Braces or Orthoses   Left Upper Extremity Brace/Splint volar splint   Position Activity Restriction   Spinal Precautions No Bending; No Lifting; No Twisting   Other position/activity restrictions chronic T12, L1 fx(non-op   General   Chart Reviewed Yes   Subjective   Subjective patient resting in her bed ready for therapy   Subjective   Subjective upper  back   Pain 1/10   Bed mobility   Supine to Sit Minimal assistance;Contact guard assistance   Transfers   Sit to Stand Stand by assistance   Stand to sit Stand by assistance   Bed to Chair Stand by assistance  (stepping over to [facility] with no AD)   Ambulation   WB Status WBAT   Ambulation   Surface level tile   Device No Device   Other Apparatus Wheelchair follow   Assistance Contact guard assistance   Quality of Gait decreased step length noted bilaterally, increased lateral sway with no LOB, upright erect posture    Gait Deviations Slow Jimmie; Increased SERGIO; Decreased step length   Distance 250 FT   Ambulation 2   Surface - 2 level tile   Device 2 Kern rail   Assistance 2 Stand by assistance   Quality of Gait 2 side stepping down and back x2    Distance 10 FT   Ambulation 3   Surface - 3 level tile   Device 3 No device   Other Apparatus 3 Wheelchair follow   Assistance 3 Contact guard assistance   Quality of Gait 3 increased SERGIO, increased lateral sway and decreased jimmie   Gait Deviations Slow Jimmie;Decreased step length; Increased SERGIO   Distance 70 FT   PT Exercises   Exercise Treatment standing marching, knee flexion, hip abduction, hip extension, heel raises, seated toe raises, LAQ, GS x 20 each,   Activity Tolerance   Activity Tolerance Patient tolerated treatment well   Assessment   Assessment patient did well with treatment, she required multiple stops to catch her breath, she continues to have decreased endurance and balance    Safety Devices   Type of Devices Left in chair;Chair alarm in place  (in OT)   PT Individual Minutes   Time In 1000   Time Out 1100   Minutes 60     Electronically signed by Sherri Epley, SPT on 6/6/2022 at 11:06 AM

## 2022-06-06 NOTE — PLAN OF CARE
Problem: Discharge Planning  Goal: Discharge to home or other facility with appropriate resources  6/6/2022 0133 by Samaria Blas RN  Outcome: Progressing  6/5/2022 1800 by Claire Ramirez RN  Outcome: Progressing     Problem: Safety - Adult  Goal: Free from fall injury  6/6/2022 0133 by Samaria Blas RN  Outcome: Progressing  6/5/2022 1800 by Claire Ramirez RN  Outcome: Progressing     Problem: ABCDS Injury Assessment  Goal: Absence of physical injury  6/6/2022 0133 by Samaria Blas RN  Outcome: Progressing  6/5/2022 1800 by Claire Ramirez RN  Outcome: Progressing     Problem: Pain  Goal: Verbalizes/displays adequate comfort level or baseline comfort level  6/6/2022 0133 by Samaria Blas RN  Outcome: Progressing  6/5/2022 1800 by Claire Ramirez RN  Outcome: Progressing

## 2022-06-06 NOTE — PROGRESS NOTES
06/06/22 1300   Restrictions/Precautions   Restrictions/Precautions Surgical Protocols; Fall Risk;Weight Bearing   Required Braces or Orthoses? Yes   Upper Extremity Weight Bearing Restrictions   Left Upper Extremity Weight Bearing Non Weight Bearing   Required Braces or Orthoses   Spinal Thoracic Lumbar Sacral Orthotics   Spinal Other Wendy brace for comfort   Left Upper Extremity Brace/Splint volar splint   Position Activity Restriction   Spinal Precautions No Bending; No Lifting; No Twisting   Other position/activity restrictions chronic T12, L1 fx(non-op   Subjective   Subjective patient up in [facility] after eating   Transfers   Sit to Stand Stand by assistance   Stand to sit Stand by assistance   Bed to Chair Stand by assistance  (walking from [facility] to bathroom then back to [facility])   Ambulation   WB Status WBAT   Ambulation   Surface level tile   Device Kern rail   Other Apparatus Wheelchair follow  (TLSO)   Assistance Stand by assistance;Supervision   Quality of Gait patient had much more increased confidence when using the hand rail   Gait Deviations Slow Lisa;Decreased step height;Decreased step length   Distance 450 FT   Ambulation 2   Surface - 2 level tile   Device 2 Single point cane   Other Apparatus 2   (TLSO)   Assistance 2 Stand by assistance   Quality of Gait 2 patient was much more confident when walking with a cane, she had decreased lateral sway and increase step length and height    Gait Deviations Slow Lisa   Distance 300 FT   Propulsion 1   Propulsion Manual   Level Level Tile   Method RUE;RLE;LLE   Level of Assistance Stand by assistance   Description/ Details patient was able to provide good speed and distance using B LE and R UE   Distance 60 FT    Activity Tolerance   Activity Tolerance Patient tolerated treatment well   Assessment   Assessment patient did well and showe increased confidence in her walking, continues to struggle with endurance and strength, had more comfort and endurance using her TLSO this afternoon compared to this morning, see ambulation   Safety Devices   Type of Devices Call light within reach; Chair alarm in place; Left in chair   PT Individual Minutes   Time In 1300   Time Out 1345   Minutes 45     Electronically signed by MICHAEL Lock on 6/6/2022 at 2:19 PM
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
## 2022-06-07 PROCEDURE — 99233 SBSQ HOSP IP/OBS HIGH 50: CPT | Performed by: PSYCHIATRY & NEUROLOGY

## 2022-06-07 PROCEDURE — 6370000000 HC RX 637 (ALT 250 FOR IP): Performed by: PSYCHIATRY & NEUROLOGY

## 2022-06-07 PROCEDURE — 6360000002 HC RX W HCPCS: Performed by: PSYCHIATRY & NEUROLOGY

## 2022-06-07 PROCEDURE — 94640 AIRWAY INHALATION TREATMENT: CPT

## 2022-06-07 PROCEDURE — 1180000000 HC REHAB R&B

## 2022-06-07 PROCEDURE — 97116 GAIT TRAINING THERAPY: CPT

## 2022-06-07 PROCEDURE — 97530 THERAPEUTIC ACTIVITIES: CPT

## 2022-06-07 RX ADMIN — ROPINIROLE HYDROCHLORIDE 0.5 MG: 0.25 TABLET, FILM COATED ORAL at 21:25

## 2022-06-07 RX ADMIN — ALBUTEROL SULFATE 2.5 MG: 2.5 SOLUTION RESPIRATORY (INHALATION) at 18:48

## 2022-06-07 RX ADMIN — ARFORMOTEROL TARTRATE 15 MCG: 15 SOLUTION RESPIRATORY (INHALATION) at 19:00

## 2022-06-07 RX ADMIN — ALLOPURINOL 100 MG: 100 TABLET ORAL at 07:44

## 2022-06-07 RX ADMIN — BUDESONIDE 250 MCG: 0.25 SUSPENSION RESPIRATORY (INHALATION) at 18:59

## 2022-06-07 RX ADMIN — LOSARTAN POTASSIUM 25 MG: 25 TABLET, FILM COATED ORAL at 07:44

## 2022-06-07 RX ADMIN — METHOCARBAMOL 500 MG: 500 TABLET ORAL at 14:34

## 2022-06-07 RX ADMIN — ACETAMINOPHEN 1000 MG: 500 TABLET ORAL at 11:34

## 2022-06-07 RX ADMIN — BUDESONIDE 250 MCG: 0.25 SUSPENSION RESPIRATORY (INHALATION) at 07:02

## 2022-06-07 RX ADMIN — CITALOPRAM HYDROBROMIDE 40 MG: 20 TABLET ORAL at 07:44

## 2022-06-07 RX ADMIN — GABAPENTIN 100 MG: 100 CAPSULE ORAL at 07:45

## 2022-06-07 RX ADMIN — METHOCARBAMOL 500 MG: 500 TABLET ORAL at 21:25

## 2022-06-07 RX ADMIN — ALBUTEROL SULFATE 2.5 MG: 2.5 SOLUTION RESPIRATORY (INHALATION) at 06:52

## 2022-06-07 RX ADMIN — PANTOPRAZOLE SODIUM 40 MG: 40 TABLET, DELAYED RELEASE ORAL at 07:44

## 2022-06-07 RX ADMIN — LEVOTHYROXINE SODIUM 50 MCG: 50 TABLET ORAL at 05:40

## 2022-06-07 RX ADMIN — CETIRIZINE HYDROCHLORIDE 10 MG: 10 TABLET, FILM COATED ORAL at 21:25

## 2022-06-07 RX ADMIN — METHOCARBAMOL 500 MG: 500 TABLET ORAL at 07:44

## 2022-06-07 RX ADMIN — GABAPENTIN 100 MG: 100 CAPSULE ORAL at 21:25

## 2022-06-07 RX ADMIN — ALBUTEROL SULFATE 2.5 MG: 2.5 SOLUTION RESPIRATORY (INHALATION) at 11:28

## 2022-06-07 RX ADMIN — GABAPENTIN 100 MG: 100 CAPSULE ORAL at 14:34

## 2022-06-07 RX ADMIN — ACETAMINOPHEN 1000 MG: 500 TABLET ORAL at 21:24

## 2022-06-07 RX ADMIN — SENNOSIDES AND DOCUSATE SODIUM 2 TABLET: 50; 8.6 TABLET ORAL at 21:25

## 2022-06-07 RX ADMIN — ARFORMOTEROL TARTRATE 15 MCG: 15 SOLUTION RESPIRATORY (INHALATION) at 07:02

## 2022-06-07 RX ADMIN — CHOLESTYRAMINE 4 G: 4 POWDER, FOR SUSPENSION ORAL at 07:45

## 2022-06-07 RX ADMIN — ACETAMINOPHEN 1000 MG: 500 TABLET ORAL at 05:40

## 2022-06-07 ASSESSMENT — PAIN DESCRIPTION - ORIENTATION
ORIENTATION: RIGHT;LEFT

## 2022-06-07 ASSESSMENT — PAIN SCALES - GENERAL
PAINLEVEL_OUTOF10: 0
PAINLEVEL_OUTOF10: 1
PAINLEVEL_OUTOF10: 0
PAINLEVEL_OUTOF10: 1
PAINLEVEL_OUTOF10: 1
PAINLEVEL_OUTOF10: 2

## 2022-06-07 ASSESSMENT — PAIN DESCRIPTION - LOCATION
LOCATION: BACK;SHOULDER

## 2022-06-07 ASSESSMENT — PAIN DESCRIPTION - DESCRIPTORS
DESCRIPTORS: ACHING

## 2022-06-07 ASSESSMENT — PAIN - FUNCTIONAL ASSESSMENT
PAIN_FUNCTIONAL_ASSESSMENT: ACTIVITIES ARE NOT PREVENTED
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
## 2022-06-07 NOTE — PROGRESS NOTES
06/07/22 1345   Restrictions/Precautions   Restrictions/Precautions Surgical Protocols; Fall Risk;Weight Bearing   Upper Extremity Weight Bearing Restrictions   Left Upper Extremity Weight Bearing Non Weight Bearing   Required Braces or Orthoses   Spinal Thoracic Lumbar Sacral Orthotics   Left Upper Extremity Brace/Splint volar splint   Position Activity Restriction   Spinal Precautions No Bending; No Lifting; No Twisting   Other position/activity restrictions chronic T12, L1 fx(non-op   Transfers   Sit to Stand Supervision;Stand by assistance   Stand to sit Supervision;Stand by assistance   Ambulation   WB Status WBAT   Ambulation   Surface level tile;uneven;carpet   Device Single point cane  (in R UE)   Other Apparatus   (TLSO)   Assistance Contact guard assistance;Stand by assistance   Quality of Gait patient had much improved gait confidence was able to walk further without taking a break , continued to follow 2 point gait pattern   Gait Deviations Slow Lisa;Decreased arm swing  (decreased L arm swing 2ndary to splint)   Distance 500+ FT   More Ambulation?  Yes   Ambulation 2   Surface - 2 level tile;uneven;carpet;outdoors;ramp   Device 2 Single point cane   Other Apparatus 2   (TLSO)   Assistance 2 Contact guard assistance;Stand by assistance   Quality of Gait 2 patient had more of a staggering gait pattern once on uneven surfaces, she would have some losses of balance when starting an incline she was able to self correct everytime she was able to ambulate for longer distance with standing breaks   Gait Deviations Deviated path;Decreased step length;Decreased step height   Distance 1000+ FT   Ambulation 3   Surface - 3 carpet;outdoors;ramp;level tile;uneven   Device 3 Cane Single point cane   Other Apparatus 3   (TLSO)   Assistance 3 Contact guard assistance;Stand by assistance   Quality of Gait 3 see ambulation 2, continued pattern and slight unsteady gait on uneven surfaces and inclines   Gait Deviations Slow Lisa; Increased SERGIO   Distance 500+   Stairs/Curb   Stairs? Yes   Stairs   # Steps  16   Stairs Height 6\"   Rails Right ascending   Other Apparatus   (TLSO)   Assistance Stand by assistance;Contact guard assistance   Comment patient initially followed reciprocal pattern but was cued to follow two feet per step pattern and stated that it felt easier that way   Activity Tolerance   Activity Tolerance Patient tolerated treatment well   Assessment   Assessment patient did very well with this afternoon's session, she showed increased endurance however she continues to have deficits in her dynamic standing balance   Safety Devices   Type of Devices Call light within reach; Chair alarm in place; Left in chair   PT Individual Minutes   Time In 1400 [facility]   Time Out 1445   Minutes 60     Electronically signed by MICHAEL Hussein on 6/7/2022 at 3:02 PM

## 2022-06-07 NOTE — PROGRESS NOTES
06/07/22 0815   Restrictions/Precautions   Restrictions/Precautions Surgical Protocols; Fall Risk;Weight Bearing   Required Braces or Orthoses? Yes   Upper Extremity Weight Bearing Restrictions   Left Upper Extremity Weight Bearing Non Weight Bearing   Required Braces or Orthoses   Spinal Thoracic Lumbar Sacral Orthotics   Spinal Other Wendy brace for comfort   Left Upper Extremity Brace/Splint volar splint   Position Activity Restriction   Other position/activity restrictions chronic T12, L1 fx(non-op   Subjective   Subjective patient awake in bed and ready for treatmenr   Bed mobility   Supine to Sit Stand by assistance  (HOB elevated)   Transfers   Sit to Stand Stand by assistance;Supervision   Stand to sit Supervision   Bed to Chair Supervision;Stand by assistance   Ambulation   WB Status WBAT   Ambulation   Surface level tile   Device Single point cane  (in R UE)   Assistance Supervision;Stand by assistance   Quality of Gait patient had much more improved endurance and strength when using the cane in the R hand, her jimmie was improved althoughat the beginning of the ambulation, she was focused on getting the 2 point reciprocal pattern down, had increased step length and distance   Gait Deviations Slow Jimmie   Distance 450 Ft   More Ambulation?  Yes   Ambulation 2   Surface - 2 level tile   Device 2 Single point cane  (R UE)   Assistance 2 Stand by assistance;Supervision   Quality of Gait 2 patient was very worried about her sequencing causing an almost lurching pattern with increased step length on her L LE, she would vary between a smooth gait pattern and the lurching   Gait Deviations Slow Jimmie;Decreased arm swing   Distance 400 FT   Ambulation 3   Surface - 3 level tile   Device 3 Cane Single point cane  (in R UE)   Other Apparatus 3 Wheelchair follow  (for returning to room)   Assistance 3 Stand by assistance;Supervision   Quality of Gait 3 patient was cued to not focus on her sequencing and just worry about walking, she was able to relax more and had a more natural gait pattern, she was also able to loosen her  on the cane allowing for a smoother gait   Gait Deviations Slow Lisa   Distance 80 FT   Comments to return to her room   Balance   Standing - Dynamic Good   PT Exercises   Dynamic Standing Balance Exercises able to brush her teeth and get ready at the sink with no LOB   Activity Tolerance   Activity Tolerance Patient tolerated treatment well   Assessment   Assessment patient did well and was able to improve her gait, continues to have deficits in her high level balance, see ambulation   Safety Devices   Type of Devices Call light within reach; Chair alarm in place; Left in chair   PT Individual Minutes   Time In 0815   Time Out 0900   Minutes 45     Electronically signed by MICHAEL Cabrera on 6/7/2022 at 10:07 AM
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
## 2022-06-08 ENCOUNTER — TELEPHONE (OUTPATIENT)
Dept: NEUROSURGERY | Facility: CLINIC | Age: 67
End: 2022-06-08

## 2022-06-08 PROCEDURE — 6370000000 HC RX 637 (ALT 250 FOR IP): Performed by: PSYCHIATRY & NEUROLOGY

## 2022-06-08 PROCEDURE — 1180000000 HC REHAB R&B

## 2022-06-08 PROCEDURE — 97535 SELF CARE MNGMENT TRAINING: CPT

## 2022-06-08 PROCEDURE — 97110 THERAPEUTIC EXERCISES: CPT

## 2022-06-08 PROCEDURE — 99233 SBSQ HOSP IP/OBS HIGH 50: CPT | Performed by: PSYCHIATRY & NEUROLOGY

## 2022-06-08 PROCEDURE — 6360000002 HC RX W HCPCS: Performed by: PSYCHIATRY & NEUROLOGY

## 2022-06-08 PROCEDURE — 97530 THERAPEUTIC ACTIVITIES: CPT

## 2022-06-08 PROCEDURE — 94640 AIRWAY INHALATION TREATMENT: CPT

## 2022-06-08 PROCEDURE — 97116 GAIT TRAINING THERAPY: CPT

## 2022-06-08 RX ORDER — LIDOCAINE 4 G/G
1 PATCH TOPICAL DAILY
Status: DISCONTINUED | OUTPATIENT
Start: 2022-06-08 | End: 2022-06-10 | Stop reason: HOSPADM

## 2022-06-08 RX ADMIN — ALBUTEROL SULFATE 2.5 MG: 2.5 SOLUTION RESPIRATORY (INHALATION) at 10:32

## 2022-06-08 RX ADMIN — ACETAMINOPHEN 1000 MG: 500 TABLET ORAL at 05:07

## 2022-06-08 RX ADMIN — METHOCARBAMOL 500 MG: 500 TABLET ORAL at 08:03

## 2022-06-08 RX ADMIN — GABAPENTIN 100 MG: 100 CAPSULE ORAL at 13:38

## 2022-06-08 RX ADMIN — CITALOPRAM HYDROBROMIDE 40 MG: 20 TABLET ORAL at 08:03

## 2022-06-08 RX ADMIN — ALLOPURINOL 100 MG: 100 TABLET ORAL at 08:03

## 2022-06-08 RX ADMIN — GABAPENTIN 100 MG: 100 CAPSULE ORAL at 08:03

## 2022-06-08 RX ADMIN — ARFORMOTEROL TARTRATE 15 MCG: 15 SOLUTION RESPIRATORY (INHALATION) at 18:45

## 2022-06-08 RX ADMIN — METHOCARBAMOL 500 MG: 500 TABLET ORAL at 21:30

## 2022-06-08 RX ADMIN — LOSARTAN POTASSIUM 25 MG: 25 TABLET, FILM COATED ORAL at 08:03

## 2022-06-08 RX ADMIN — SENNOSIDES AND DOCUSATE SODIUM 2 TABLET: 50; 8.6 TABLET ORAL at 08:04

## 2022-06-08 RX ADMIN — BUDESONIDE 250 MCG: 0.25 SUSPENSION RESPIRATORY (INHALATION) at 18:45

## 2022-06-08 RX ADMIN — LEVOTHYROXINE SODIUM 50 MCG: 50 TABLET ORAL at 05:08

## 2022-06-08 RX ADMIN — ALBUTEROL SULFATE 2.5 MG: 2.5 SOLUTION RESPIRATORY (INHALATION) at 05:59

## 2022-06-08 RX ADMIN — ACETAMINOPHEN 1000 MG: 500 TABLET ORAL at 12:50

## 2022-06-08 RX ADMIN — METHOCARBAMOL 500 MG: 500 TABLET ORAL at 13:38

## 2022-06-08 RX ADMIN — ALBUTEROL SULFATE 2.5 MG: 2.5 SOLUTION RESPIRATORY (INHALATION) at 15:27

## 2022-06-08 RX ADMIN — ROPINIROLE HYDROCHLORIDE 0.5 MG: 0.25 TABLET, FILM COATED ORAL at 21:31

## 2022-06-08 RX ADMIN — PANTOPRAZOLE SODIUM 40 MG: 40 TABLET, DELAYED RELEASE ORAL at 08:03

## 2022-06-08 RX ADMIN — POLYETHYLENE GLYCOL 3350 17 G: 17 POWDER, FOR SOLUTION ORAL at 08:02

## 2022-06-08 RX ADMIN — GABAPENTIN 100 MG: 100 CAPSULE ORAL at 21:31

## 2022-06-08 RX ADMIN — ACETAMINOPHEN 1000 MG: 500 TABLET ORAL at 21:30

## 2022-06-08 RX ADMIN — CETIRIZINE HYDROCHLORIDE 10 MG: 10 TABLET, FILM COATED ORAL at 21:31

## 2022-06-08 RX ADMIN — ALBUTEROL SULFATE 2.5 MG: 2.5 SOLUTION RESPIRATORY (INHALATION) at 18:28

## 2022-06-08 ASSESSMENT — PAIN - FUNCTIONAL ASSESSMENT
PAIN_FUNCTIONAL_ASSESSMENT: ACTIVITIES ARE NOT PREVENTED

## 2022-06-08 ASSESSMENT — PAIN DESCRIPTION - LOCATION
LOCATION: BACK
LOCATION: BACK
LOCATION: BACK;SHOULDER
LOCATION: BACK

## 2022-06-08 ASSESSMENT — PAIN DESCRIPTION - DESCRIPTORS
DESCRIPTORS: ACHING

## 2022-06-08 ASSESSMENT — PAIN DESCRIPTION - ORIENTATION
ORIENTATION: RIGHT
ORIENTATION: RIGHT
ORIENTATION: RIGHT;LEFT
ORIENTATION: RIGHT

## 2022-06-08 ASSESSMENT — PAIN SCALES - GENERAL
PAINLEVEL_OUTOF10: 1
PAINLEVEL_OUTOF10: 2
PAINLEVEL_OUTOF10: 1
PAINLEVEL_OUTOF10: 2
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
## 2022-06-08 NOTE — PLAN OF CARE
Problem: Discharge Planning  Goal: Discharge to home or other facility with appropriate resources  Outcome: Progressing  Flowsheets (Taken 6/7/2022 2115)  Discharge to home or other facility with appropriate resources: Refer to discharge planning if patient needs post-hospital services based on physician order or complex needs related to functional status, cognitive ability or social support system     Problem: Safety - Adult  Goal: Free from fall injury  Outcome: Progressing     Problem: ABCDS Injury Assessment  Goal: Absence of physical injury  Outcome: Progressing     Problem: Pain  Goal: Verbalizes/displays adequate comfort level or baseline comfort level  Outcome: Progressing     Problem: Chronic Conditions and Co-morbidities  Goal: Patient's chronic conditions and co-morbidity symptoms are monitored and maintained or improved  Outcome: Progressing  Flowsheets (Taken 6/7/2022 2115)  Care Plan - Patient's Chronic Conditions and Co-Morbidity Symptoms are Monitored and Maintained or Improved: Monitor and assess patient's chronic conditions and comorbid symptoms for stability, deterioration, or improvement
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
## 2022-06-09 LAB
ANION GAP SERPL CALCULATED.3IONS-SCNC: 13 MMOL/L (ref 7–19)
BASOPHILS ABSOLUTE: 0 K/UL (ref 0–0.2)
BASOPHILS RELATIVE PERCENT: 0.5 % (ref 0–1)
BUN BLDV-MCNC: 23 MG/DL (ref 8–23)
CALCIUM SERPL-MCNC: 9.5 MG/DL (ref 8.8–10.2)
CHLORIDE BLD-SCNC: 101 MMOL/L (ref 98–111)
CO2: 23 MMOL/L (ref 22–29)
CREAT SERPL-MCNC: 0.6 MG/DL (ref 0.5–0.9)
EOSINOPHILS ABSOLUTE: 0.3 K/UL (ref 0–0.6)
EOSINOPHILS RELATIVE PERCENT: 6 % (ref 0–5)
GFR AFRICAN AMERICAN: >59
GFR NON-AFRICAN AMERICAN: >60
GLUCOSE BLD-MCNC: 100 MG/DL (ref 74–109)
HCT VFR BLD CALC: 31.8 % (ref 37–47)
HEMOGLOBIN: 9.5 G/DL (ref 12–16)
IMMATURE GRANULOCYTES #: 0 K/UL
LYMPHOCYTES ABSOLUTE: 1.4 K/UL (ref 1.1–4.5)
LYMPHOCYTES RELATIVE PERCENT: 25.3 % (ref 20–40)
MCH RBC QN AUTO: 26.1 PG (ref 27–31)
MCHC RBC AUTO-ENTMCNC: 29.9 G/DL (ref 33–37)
MCV RBC AUTO: 87.4 FL (ref 81–99)
MONOCYTES ABSOLUTE: 0.4 K/UL (ref 0–0.9)
MONOCYTES RELATIVE PERCENT: 6.6 % (ref 0–10)
NEUTROPHILS ABSOLUTE: 3.3 K/UL (ref 1.5–7.5)
NEUTROPHILS RELATIVE PERCENT: 61.2 % (ref 50–65)
PDW BLD-RTO: 16.4 % (ref 11.5–14.5)
PLATELET # BLD: 329 K/UL (ref 130–400)
PMV BLD AUTO: 10.3 FL (ref 9.4–12.3)
POTASSIUM REFLEX MAGNESIUM: 4.4 MMOL/L (ref 3.5–5)
RBC # BLD: 3.64 M/UL (ref 4.2–5.4)
SODIUM BLD-SCNC: 137 MMOL/L (ref 136–145)
WBC # BLD: 5.5 K/UL (ref 4.8–10.8)

## 2022-06-09 PROCEDURE — 6360000002 HC RX W HCPCS: Performed by: PSYCHIATRY & NEUROLOGY

## 2022-06-09 PROCEDURE — 97116 GAIT TRAINING THERAPY: CPT

## 2022-06-09 PROCEDURE — 85025 COMPLETE CBC W/AUTO DIFF WBC: CPT

## 2022-06-09 PROCEDURE — 99232 SBSQ HOSP IP/OBS MODERATE 35: CPT | Performed by: PSYCHIATRY & NEUROLOGY

## 2022-06-09 PROCEDURE — 6370000000 HC RX 637 (ALT 250 FOR IP): Performed by: PSYCHIATRY & NEUROLOGY

## 2022-06-09 PROCEDURE — 94640 AIRWAY INHALATION TREATMENT: CPT

## 2022-06-09 PROCEDURE — 97110 THERAPEUTIC EXERCISES: CPT

## 2022-06-09 PROCEDURE — 80048 BASIC METABOLIC PNL TOTAL CA: CPT

## 2022-06-09 PROCEDURE — 97530 THERAPEUTIC ACTIVITIES: CPT

## 2022-06-09 PROCEDURE — 1180000000 HC REHAB R&B

## 2022-06-09 PROCEDURE — 36415 COLL VENOUS BLD VENIPUNCTURE: CPT

## 2022-06-09 RX ADMIN — ARFORMOTEROL TARTRATE 15 MCG: 15 SOLUTION RESPIRATORY (INHALATION) at 06:09

## 2022-06-09 RX ADMIN — METHOCARBAMOL 500 MG: 500 TABLET ORAL at 20:22

## 2022-06-09 RX ADMIN — GABAPENTIN 100 MG: 100 CAPSULE ORAL at 08:06

## 2022-06-09 RX ADMIN — ACETAMINOPHEN 1000 MG: 500 TABLET ORAL at 20:21

## 2022-06-09 RX ADMIN — METHOCARBAMOL 500 MG: 500 TABLET ORAL at 08:06

## 2022-06-09 RX ADMIN — CETIRIZINE HYDROCHLORIDE 10 MG: 10 TABLET, FILM COATED ORAL at 20:22

## 2022-06-09 RX ADMIN — LOSARTAN POTASSIUM 25 MG: 25 TABLET, FILM COATED ORAL at 08:07

## 2022-06-09 RX ADMIN — GABAPENTIN 100 MG: 100 CAPSULE ORAL at 13:59

## 2022-06-09 RX ADMIN — ALLOPURINOL 100 MG: 100 TABLET ORAL at 08:06

## 2022-06-09 RX ADMIN — BUDESONIDE 250 MCG: 0.25 SUSPENSION RESPIRATORY (INHALATION) at 19:38

## 2022-06-09 RX ADMIN — ROPINIROLE HYDROCHLORIDE 0.5 MG: 0.25 TABLET, FILM COATED ORAL at 20:21

## 2022-06-09 RX ADMIN — ALBUTEROL SULFATE 2.5 MG: 2.5 SOLUTION RESPIRATORY (INHALATION) at 06:10

## 2022-06-09 RX ADMIN — LEVOTHYROXINE SODIUM 50 MCG: 50 TABLET ORAL at 05:11

## 2022-06-09 RX ADMIN — METHOCARBAMOL 500 MG: 500 TABLET ORAL at 13:59

## 2022-06-09 RX ADMIN — PANTOPRAZOLE SODIUM 40 MG: 40 TABLET, DELAYED RELEASE ORAL at 08:06

## 2022-06-09 RX ADMIN — ALBUTEROL SULFATE 2.5 MG: 2.5 SOLUTION RESPIRATORY (INHALATION) at 19:25

## 2022-06-09 RX ADMIN — CITALOPRAM HYDROBROMIDE 40 MG: 20 TABLET ORAL at 08:06

## 2022-06-09 RX ADMIN — ACETAMINOPHEN 1000 MG: 500 TABLET ORAL at 05:11

## 2022-06-09 RX ADMIN — ALBUTEROL SULFATE 2.5 MG: 2.5 SOLUTION RESPIRATORY (INHALATION) at 09:57

## 2022-06-09 RX ADMIN — ARFORMOTEROL TARTRATE 15 MCG: 15 SOLUTION RESPIRATORY (INHALATION) at 19:37

## 2022-06-09 RX ADMIN — POLYETHYLENE GLYCOL 3350 17 G: 17 POWDER, FOR SOLUTION ORAL at 08:06

## 2022-06-09 RX ADMIN — BUDESONIDE 250 MCG: 0.25 SUSPENSION RESPIRATORY (INHALATION) at 06:09

## 2022-06-09 RX ADMIN — SENNOSIDES AND DOCUSATE SODIUM 2 TABLET: 50; 8.6 TABLET ORAL at 20:22

## 2022-06-09 RX ADMIN — GABAPENTIN 100 MG: 100 CAPSULE ORAL at 20:22

## 2022-06-09 RX ADMIN — ACETAMINOPHEN 1000 MG: 500 TABLET ORAL at 12:58

## 2022-06-09 ASSESSMENT — PAIN SCALES - GENERAL
PAINLEVEL_OUTOF10: 0
PAINLEVEL_OUTOF10: 1

## 2022-06-09 ASSESSMENT — PAIN DESCRIPTION - ORIENTATION
ORIENTATION: RIGHT

## 2022-06-09 ASSESSMENT — PAIN - FUNCTIONAL ASSESSMENT
PAIN_FUNCTIONAL_ASSESSMENT: ACTIVITIES ARE NOT PREVENTED

## 2022-06-09 ASSESSMENT — PAIN DESCRIPTION - DESCRIPTORS
DESCRIPTORS: ACHING

## 2022-06-09 ASSESSMENT — PAIN DESCRIPTION - ONSET: ONSET: ON-GOING

## 2022-06-09 ASSESSMENT — PAIN DESCRIPTION - FREQUENCY: FREQUENCY: CONTINUOUS

## 2022-06-09 ASSESSMENT — PAIN DESCRIPTION - LOCATION
LOCATION: LEG
LOCATION: BACK
LOCATION: LEG
LOCATION: LEG

## 2022-06-09 ASSESSMENT — PAIN DESCRIPTION - PAIN TYPE: TYPE: ACUTE PAIN

## 2022-06-09 NOTE — PLAN OF CARE
Problem: Discharge Planning  Goal: Discharge to home or other facility with appropriate resources  6/8/2022 2314 by Linad Mckinley RN  Outcome: Progressing  Flowsheets (Taken 6/8/2022 2130)  Discharge to home or other facility with appropriate resources: Refer to discharge planning if patient needs post-hospital services based on physician order or complex needs related to functional status, cognitive ability or social support system  6/8/2022 1438 by Evaristo Cabot, RN  Outcome: Progressing     Problem: Safety - Adult  Goal: Free from fall injury  6/8/2022 2314 by Linda Mckinley RN  Outcome: Progressing  6/8/2022 1438 by Evaristo Cabot, RN  Outcome: Progressing     Problem: ABCDS Injury Assessment  Goal: Absence of physical injury  6/8/2022 2314 by Linda Mckinley RN  Outcome: Progressing  6/8/2022 1438 by Evaristo Cabot, RN  Outcome: Progressing     Problem: Pain  Goal: Verbalizes/displays adequate comfort level or baseline comfort level  6/8/2022 2314 by Linda Mckinley RN  Outcome: Progressing  6/8/2022 1438 by Evaristo Cabot, RN  Outcome: Progressing     Problem: Chronic Conditions and Co-morbidities  Goal: Patient's chronic conditions and co-morbidity symptoms are monitored and maintained or improved  6/8/2022 2314 by Linda Mckinley RN  Outcome: Progressing  [address] (Taken 6/8/2022 2130)  Care Plan - Patient's Chronic Conditions and Co-Morbidity Symptoms are Monitored and Maintained or Improved: Monitor and assess patient's chronic conditions and comorbid symptoms for stability, deterioration, or improvement  6/8/2022 1438 by Evaristo Cabot, RN  Outcome: Progressing

## 2022-06-09 NOTE — PROGRESS NOTES
I spoke with Dr. Shlomo Kamara office. Dr. Laurent Cristobal wants the patient to be seen one more time at [facility] and then will see Ms. Amado for continuation of care.
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
## 2022-06-09 NOTE — PROGRESS NOTES
Received a phone call from [address] [facility] surgery. Spoke with Kiko Gonzales per Dr. Edmond Garcia. Patient can see doctor in [facility] instead of going back to [address] [place] [address]. If cast is removed, then she will need a wrist cockup brace. She will need to be non weight bearing to the right wrist/arm.

## 2022-06-09 NOTE — PROGRESS NOTES
Physical Therapy  Name: Heaven Nolan  MRN:  [id number]  Date of service:  6/9/2022 06/09/22 1340   Restrictions/Precautions   Restrictions/Precautions Up Ad Sudha;Surgical Protocols;Weight Bearing   Required Braces or Orthoses? Yes   Upper Extremity Weight Bearing Restrictions   Left Upper Extremity Weight Bearing Non Weight Bearing   Required Braces or Orthoses   Spinal Thoracic Lumbar Sacral Orthotics   Spinal Other Wendy brace for comfort   Left Upper Extremity Brace/Splint volar splint   Position Activity Restriction   Spinal Precautions No Bending; No Lifting; No Twisting   Other position/activity restrictions chronic T12, L1 fx(non-op   General Comment   Comments sitting up in WC post OT   Subjective   Subjective Ready and agreeable   Transfers   Sit to Stand Independent   Stand to sit Independent   Bed to Chair Independent   Car Transfer Supervision   Comment in/out of car (ht simulating SUV and car); pointers to protect back   Ambulation   WB Status WBAT   Ambulation   Surface level tile   Device No Device   Assistance Supervision   Quality of Gait no lob   Gait Deviations Decreased arm swing;Decreased step height;Decreased step length   Distance 600'   Comments commands during gt to change directions, speeds, weave, side step, back step; all managed without incident    Stairs/Curb   Stairs?  Yes   Stairs   # Steps  7   Stairs Height 6\"  (3 x 6\" and 4 x 4\")   Rails Right ascending  (sidesteps with descent due to NWB L UE)   Device No Device   Assistance Stand by assistance   PT Exercises   Standing Open/Closed Kinetic Chain Exercises stepping over QC's x 12 alternating feet supervision and no lob;standing on rubber disc x 1 min with cg@ and occasional use of R hand to steady; standing on foam wedge x 30\" narrow graham; standing on foam wedge x 30\" EC, marching on foam wedge x 20, heel raises on foam wedge x 15   Assessment   Assessment pt demonstrates progress with car transfer (pointers to protect back with precautions); cont to show improved quality of amb and improved confidence managing challenges/obstacles without issue    Safety Devices   Type of Devices Call light within reach; Left in chair   PT Individual Minutes   Time In 1345   Time Out 1430   Minutes 45       Electronically signed by Mauro Kemp PTA on 6/9/2022 at 3:16 PM

## 2022-06-09 NOTE — PROGRESS NOTES
Nutrition Education    Nutrition Assessment: Pt provided with CHO control diet education per request. Handouts and RD contact information provided. Pt asked appropriate questions and v/u. Pt was able to demonstrate understanding by identifing CHO containing foods in her meals. · Educated on CHO controlled diet. · Learners: Patient  · Readiness: Eager  · Method: Explanation, Handout and Teachback  · Response: Demonstrated Understanding  · Contact name and number provided.     Wang Mclain MS, RD, LD  Contact Number: [phone]

## 2022-06-10 VITALS
OXYGEN SATURATION: 95 % | TEMPERATURE: 96.6 F | BODY MASS INDEX: 34.1 KG/M2 | HEART RATE: 72 BPM | SYSTOLIC BLOOD PRESSURE: 125 MMHG | RESPIRATION RATE: 18 BRPM | WEIGHT: 185.31 LBS | DIASTOLIC BLOOD PRESSURE: 59 MMHG | HEIGHT: 62 IN

## 2022-06-10 PROCEDURE — 93971 EXTREMITY STUDY: CPT | Performed by: SURGERY

## 2022-06-10 PROCEDURE — 94640 AIRWAY INHALATION TREATMENT: CPT

## 2022-06-10 PROCEDURE — 93971 EXTREMITY STUDY: CPT

## 2022-06-10 PROCEDURE — 99239 HOSP IP/OBS DSCHRG MGMT >30: CPT | Performed by: PSYCHIATRY & NEUROLOGY

## 2022-06-10 PROCEDURE — 6360000002 HC RX W HCPCS: Performed by: PSYCHIATRY & NEUROLOGY

## 2022-06-10 PROCEDURE — 6370000000 HC RX 637 (ALT 250 FOR IP): Performed by: PSYCHIATRY & NEUROLOGY

## 2022-06-10 RX ORDER — SENNA AND DOCUSATE SODIUM 50; 8.6 MG/1; MG/1
2 TABLET, FILM COATED ORAL 2 TIMES DAILY
Qty: 120 TABLET | Refills: 0 | Status: SHIPPED | OUTPATIENT
Start: 2022-06-10 | End: 2022-06-10 | Stop reason: SDUPTHER

## 2022-06-10 RX ORDER — ALBUTEROL SULFATE 90 UG/1
2 AEROSOL, METERED RESPIRATORY (INHALATION) 4 TIMES DAILY
Qty: 18 G | Refills: 3 | Status: SHIPPED | OUTPATIENT
Start: 2022-06-10 | End: 2022-06-10 | Stop reason: SDUPTHER

## 2022-06-10 RX ORDER — PANTOPRAZOLE SODIUM 40 MG/1
40 TABLET, DELAYED RELEASE ORAL DAILY
Qty: 30 TABLET | Refills: 3 | Status: SHIPPED | OUTPATIENT
Start: 2022-06-10

## 2022-06-10 RX ORDER — ALBUTEROL SULFATE 90 UG/1
2 AEROSOL, METERED RESPIRATORY (INHALATION) 4 TIMES DAILY
Qty: 18 G | Refills: 3 | Status: SHIPPED | OUTPATIENT
Start: 2022-06-10

## 2022-06-10 RX ORDER — PANTOPRAZOLE SODIUM 40 MG/1
40 TABLET, DELAYED RELEASE ORAL DAILY
Qty: 30 TABLET | Refills: 3 | Status: SHIPPED | OUTPATIENT
Start: 2022-06-10 | End: 2022-06-10 | Stop reason: SDUPTHER

## 2022-06-10 RX ORDER — GABAPENTIN 100 MG/1
100 CAPSULE ORAL 3 TIMES DAILY
Qty: 90 CAPSULE | Refills: 3 | Status: SHIPPED | OUTPATIENT
Start: 2022-06-10 | End: 2022-07-10

## 2022-06-10 RX ORDER — METHOCARBAMOL 500 MG/1
500 TABLET, FILM COATED ORAL 3 TIMES DAILY
Qty: 90 TABLET | Refills: 0 | Status: SHIPPED | OUTPATIENT
Start: 2022-06-10 | End: 2022-06-10 | Stop reason: SDUPTHER

## 2022-06-10 RX ORDER — FLUTICASONE FUROATE AND VILANTEROL 100; 25 UG/1; UG/1
1 POWDER RESPIRATORY (INHALATION) DAILY
Qty: 1 EACH | Refills: 0 | Status: SHIPPED | OUTPATIENT
Start: 2022-06-10

## 2022-06-10 RX ORDER — FLUTICASONE FUROATE AND VILANTEROL 100; 25 UG/1; UG/1
1 POWDER RESPIRATORY (INHALATION) DAILY
Qty: 1 EACH | Refills: 0 | Status: SHIPPED | OUTPATIENT
Start: 2022-06-10 | End: 2022-06-10 | Stop reason: SDUPTHER

## 2022-06-10 RX ORDER — GABAPENTIN 100 MG/1
100 CAPSULE ORAL 3 TIMES DAILY
Qty: 90 CAPSULE | Refills: 3 | Status: SHIPPED | OUTPATIENT
Start: 2022-06-10 | End: 2022-06-10 | Stop reason: SDUPTHER

## 2022-06-10 RX ORDER — METHOCARBAMOL 500 MG/1
500 TABLET, FILM COATED ORAL 3 TIMES DAILY
Qty: 90 TABLET | Refills: 0 | Status: SHIPPED | OUTPATIENT
Start: 2022-06-10

## 2022-06-10 RX ORDER — SENNA AND DOCUSATE SODIUM 50; 8.6 MG/1; MG/1
2 TABLET, FILM COATED ORAL 2 TIMES DAILY
Qty: 120 TABLET | Refills: 0 | Status: SHIPPED | OUTPATIENT
Start: 2022-06-10

## 2022-06-10 RX ADMIN — ACETAMINOPHEN 1000 MG: 500 TABLET ORAL at 12:26

## 2022-06-10 RX ADMIN — PANTOPRAZOLE SODIUM 40 MG: 40 TABLET, DELAYED RELEASE ORAL at 08:06

## 2022-06-10 RX ADMIN — LEVOTHYROXINE SODIUM 50 MCG: 50 TABLET ORAL at 05:28

## 2022-06-10 RX ADMIN — ARFORMOTEROL TARTRATE 15 MCG: 15 SOLUTION RESPIRATORY (INHALATION) at 07:34

## 2022-06-10 RX ADMIN — POLYETHYLENE GLYCOL 3350 17 G: 17 POWDER, FOR SOLUTION ORAL at 08:08

## 2022-06-10 RX ADMIN — METHOCARBAMOL 500 MG: 500 TABLET ORAL at 08:06

## 2022-06-10 RX ADMIN — LOSARTAN POTASSIUM 25 MG: 25 TABLET, FILM COATED ORAL at 08:07

## 2022-06-10 RX ADMIN — ACETAMINOPHEN 1000 MG: 500 TABLET ORAL at 05:28

## 2022-06-10 RX ADMIN — BUDESONIDE 250 MCG: 0.25 SUSPENSION RESPIRATORY (INHALATION) at 07:35

## 2022-06-10 RX ADMIN — GABAPENTIN 100 MG: 100 CAPSULE ORAL at 13:10

## 2022-06-10 RX ADMIN — ALBUTEROL SULFATE 2.5 MG: 2.5 SOLUTION RESPIRATORY (INHALATION) at 06:54

## 2022-06-10 RX ADMIN — ALLOPURINOL 100 MG: 100 TABLET ORAL at 08:06

## 2022-06-10 RX ADMIN — METHOCARBAMOL 500 MG: 500 TABLET ORAL at 13:11

## 2022-06-10 RX ADMIN — GABAPENTIN 100 MG: 100 CAPSULE ORAL at 08:06

## 2022-06-10 RX ADMIN — CITALOPRAM HYDROBROMIDE 40 MG: 20 TABLET ORAL at 08:06

## 2022-06-10 ASSESSMENT — PAIN DESCRIPTION - ORIENTATION
ORIENTATION: RIGHT
ORIENTATION: RIGHT

## 2022-06-10 ASSESSMENT — PAIN DESCRIPTION - LOCATION
LOCATION: LEG

## 2022-06-10 ASSESSMENT — PAIN SCALES - GENERAL
PAINLEVEL_OUTOF10: 1
PAINLEVEL_OUTOF10: 0

## 2022-06-10 ASSESSMENT — PAIN DESCRIPTION - DESCRIPTORS
DESCRIPTORS: ACHING;DULL
DESCRIPTORS: ACHING
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
## 2022-06-10 NOTE — PROGRESS NOTES
Vascular Preliminary Report      Right Lower Extremity Venous Duplex Completed. No evidence of DVT, SVT, or Reflux noted at this time. Fluid collection and swelling noted in the anterior/lateral right calf under the bruise nearest the patient's knee. Final Report Pending.

## 2022-06-10 NOTE — CARE COORDINATION
Ms. Hale Dear, being discharged home with . Referral made to [facility] for nurse assess, PT and OT. She is contacting surgeon's office at [facility] to remake appointment understanding Dr. Ben Julio will not see until at least first follow up completed. Referral made to [facility] for home delivered meals and senior rides.
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
## 2022-07-09 ENCOUNTER — HOSPITAL ENCOUNTER (OUTPATIENT)
Dept: GENERAL RADIOLOGY | Facility: HOSPITAL | Age: 67
Discharge: HOME | End: 2022-07-09

## 2022-07-09 PROCEDURE — 73502 X-RAY EXAM HIP UNI 2-3 VIEWS: CPT

## 2022-07-12 ENCOUNTER — TRANSCRIBE ORDERS (OUTPATIENT)
Dept: ADMINISTRATIVE | Facility: HOSPITAL | Age: 67
End: 2022-07-12

## 2022-07-12 ENCOUNTER — HOSPITAL ENCOUNTER (OUTPATIENT)
Dept: ULTRASOUND IMAGING | Facility: HOSPITAL | Age: 67
Discharge: HOME | End: 2022-07-12
Admitting: INTERNAL MEDICINE

## 2022-07-12 DIAGNOSIS — M79.604 RIGHT LEG PAIN: Primary | ICD-10-CM

## 2022-07-12 PROCEDURE — 93971 EXTREMITY STUDY: CPT

## 2022-08-15 ENCOUNTER — HOSPITAL ENCOUNTER (OUTPATIENT)
Dept: GENERAL RADIOLOGY | Facility: HOSPITAL | Age: 67
Discharge: HOME | End: 2022-08-15
Admitting: NURSE PRACTITIONER

## 2022-08-15 ENCOUNTER — OFFICE VISIT (OUTPATIENT)
Dept: NEUROSURGERY | Facility: CLINIC | Age: 67
End: 2022-08-15

## 2022-08-15 VITALS — BODY MASS INDEX: 35.22 KG/M2 | HEIGHT: 62 IN | WEIGHT: 191.4 LBS

## 2022-08-15 DIAGNOSIS — M54.59 MECHANICAL LOW BACK PAIN: ICD-10-CM

## 2022-08-15 DIAGNOSIS — M54.59 MECHANICAL LOW BACK PAIN: Primary | ICD-10-CM

## 2022-08-15 DIAGNOSIS — E66.01 CLASS 2 SEVERE OBESITY DUE TO EXCESS CALORIES WITH SERIOUS COMORBIDITY AND BODY MASS INDEX (BMI) OF 35.0 TO 35.9 IN ADULT: ICD-10-CM

## 2022-08-15 PROCEDURE — 99214 OFFICE O/P EST MOD 30 MIN: CPT | Performed by: NURSE PRACTITIONER

## 2022-08-15 PROCEDURE — 72110 X-RAY EXAM L-2 SPINE 4/>VWS: CPT

## 2022-08-15 NOTE — PATIENT INSTRUCTIONS
"https://www.nhlbi.nih.gov/files/docs/public/heart/dash_brief.pdf\">   DASH Eating Plan  DASH stands for Dietary Approaches to Stop Hypertension. The DASH eating plan is a healthy eating plan that has been shown to:  Reduce high blood pressure (hypertension).  Reduce your risk for type 2 diabetes, heart disease, and stroke.  Help with weight loss.  What are tips for following this plan?  Reading food labels  Check food labels for the amount of salt (sodium) per serving. Choose foods with less than 5 percent of the Daily Value of sodium. Generally, foods with less than 300 milligrams (mg) of sodium per serving fit into this eating plan.  To find whole grains, look for the word \"whole\" as the first word in the ingredient list.  Shopping  Buy products labeled as \"low-sodium\" or \"no salt added.\"  Buy fresh foods. Avoid canned foods and pre-made or frozen meals.  Cooking  Avoid adding salt when cooking. Use salt-free seasonings or herbs instead of table salt or sea salt. Check with your health care provider or pharmacist before using salt substitutes.  Do not dwyer foods. Cook foods using healthy methods such as baking, boiling, grilling, roasting, and broiling instead.  Cook with heart-healthy oils, such as olive, canola, avocado, soybean, or sunflower oil.  Meal planning    Eat a balanced diet that includes:  4 or more servings of fruits and 4 or more servings of vegetables each day. Try to fill one-half of your plate with fruits and vegetables.  6-8 servings of whole grains each day.  Less than 6 oz (170 g) of lean meat, poultry, or fish each day. A 3-oz (85-g) serving of meat is about the same size as a deck of cards. One egg equals 1 oz (28 g).  2-3 servings of low-fat dairy each day. One serving is 1 cup (237 mL).  1 serving of nuts, seeds, or beans 5 times each week.  2-3 servings of heart-healthy fats. Healthy fats called omega-3 fatty acids are found in foods such as walnuts, flaxseeds, fortified milks, and eggs. " These fats are also found in cold-water fish, such as sardines, salmon, and mackerel.  Limit how much you eat of:  Canned or prepackaged foods.  Food that is high in trans fat, such as some fried foods.  Food that is high in saturated fat, such as fatty meat.  Desserts and other sweets, sugary drinks, and other foods with added sugar.  Full-fat dairy products.  Do not salt foods before eating.  Do not eat more than 4 egg yolks a week.  Try to eat at least 2 vegetarian meals a week.  Eat more home-cooked food and less restaurant, buffet, and fast food.    Lifestyle  When eating at a restaurant, ask that your food be prepared with less salt or no salt, if possible.  If you drink alcohol:  Limit how much you use to:  0-1 drink a day for women who are not pregnant.  0-2 drinks a day for men.  Be aware of how much alcohol is in your drink. In the [place] one drink equals one 12 oz bottle of beer (355 mL), one 5 oz glass of wine (148 mL), or one 1½ oz glass of hard liquor (44 mL).  General information  Avoid eating more than 2,300 mg of salt a day. If you have hypertension, you may need to reduce your sodium intake to 1,500 mg a day.  Work with your health care provider to maintain a healthy body weight or to lose weight. Ask what an ideal weight is for you.  Get at least 30 minutes of exercise that causes your heart to beat faster (aerobic exercise) most days of the week. Activities may include walking, swimming, or biking.  Work with your health care provider or dietitian to adjust your eating plan to your individual calorie needs.  What foods should I eat?  Fruits  All fresh, dried, or frozen fruit. Canned fruit in natural juice (without added sugar).  Vegetables  Fresh or frozen vegetables (raw, steamed, roasted, or grilled). Low-sodium or reduced-sodium tomato and vegetable juice. Low-sodium or reduced-sodium tomato sauce and tomato paste. Low-sodium or reduced-sodium canned vegetables.  Grains  Whole-grain or  whole-wheat bread. Whole-grain or whole-wheat pasta. Brown rice. Oatmeal. Quinoa. Bulgur. Whole-grain and low-sodium cereals. Keira bread. Low-fat, low-sodium crackers. Whole-wheat flour tortillas.  Meats and other proteins  Skinless chicken or turkey. Ground chicken or turkey. Pork with fat trimmed off. Fish and seafood. Egg whites. Dried beans, peas, or lentils. Unsalted nuts, nut butters, and seeds. Unsalted canned beans. Lean cuts of beef with fat trimmed off. Low-sodium, lean precooked or cured meat, such as sausages or meat loaves.  Dairy  Low-fat (1%) or fat-free (skim) milk. Reduced-fat, low-fat, or fat-free cheeses. Nonfat, low-sodium ricotta or cottage cheese. Low-fat or nonfat yogurt. Low-fat, low-sodium cheese.  Fats and oils  Soft margarine without trans fats. Vegetable oil. Reduced-fat, low-fat, or light mayonnaise and salad dressings (reduced-sodium). Canola, safflower, olive, avocado, soybean, and sunflower oils. Avocado.  Seasonings and condiments  Herbs. Spices. Seasoning mixes without salt.  Other foods  Unsalted popcorn and pretzels. Fat-free sweets.  The items listed above may not be a complete list of foods and beverages you can eat. Contact a dietitian for more information.  What foods should I avoid?  Fruits  Canned fruit in a light or heavy syrup. Fried fruit. Fruit in cream or butter sauce.  Vegetables  Creamed or fried vegetables. Vegetables in a cheese sauce. Regular canned vegetables (not low-sodium or reduced-sodium). Regular canned tomato sauce and paste (not low-sodium or reduced-sodium). Regular tomato and vegetable juice (not low-sodium or reduced-sodium). Pickles. Olives.  Grains  Baked goods made with fat, such as croissants, muffins, or some breads. Dry pasta or rice meal packs.  Meats and other proteins  Fatty cuts of meat. Ribs. Fried meat. Martell. Bologna, salami, and other precooked or cured meats, such as sausages or meat loaves. Fat from the back of a pig (fatback).  Bratwurst. Salted nuts and seeds. Canned beans with added salt. Canned or smoked fish. Whole eggs or egg yolks. Chicken or turkey with skin.  Dairy  Whole or 2% milk, cream, and half-and-half. Whole or full-fat cream cheese. Whole-fat or sweetened yogurt. Full-fat cheese. Nondairy creamers. Whipped toppings. Processed cheese and cheese spreads.  Fats and oils  Butter. Stick margarine. Lard. Shortening. Ghee. Martell fat. Tropical oils, such as coconut, palm kernel, or palm oil.  Seasonings and condiments  Onion salt, garlic salt, seasoned salt, table salt, and sea salt. Worcestershire sauce. Tartar sauce. Barbecue sauce. Teriyaki sauce. Soy sauce, including reduced-sodium. Steak sauce. Canned and packaged gravies. Fish sauce. Oyster sauce. Cocktail sauce. Store-bought horseradish. Ketchup. Mustard. Meat flavorings and tenderizers. Bouillon cubes. Hot sauces. Pre-made or packaged marinades. Pre-made or packaged taco seasonings. Relishes. Regular salad dressings.  Other foods  Salted popcorn and pretzels.  The items listed above may not be a complete list of foods and beverages you should avoid. Contact a dietitian for more information.  Where to find more information  National Heart, Lung, and Blood [place]: www.nhlbi.nih.gov  American Heart Association: www.heart.org  Academy of Nutrition and Dietetics: www.eatright.org  National Kidney Foundation: www.kidney.org  Summary  The DASH eating plan is a healthy eating plan that has been shown to reduce high blood pressure (hypertension). It may also reduce your risk for type 2 diabetes, heart disease, and stroke.  When on the DASH eating plan, aim to eat more fresh fruits and vegetables, whole grains, lean proteins, low-fat dairy, and heart-healthy fats.  With the DASH eating plan, you should limit salt (sodium) intake to 2,300 mg a day. If you have hypertension, you may need to reduce your sodium intake to 1,500 mg a day.  Work with your health care provider or  dietitian to adjust your eating plan to your individual calorie needs.  This information is not intended to replace advice given to you by your health care provider. Make sure you discuss any questions you have with your health care provider.  Document Revised: 11/20/2020 Document Reviewed: 11/20/2020  [employer] Patient Education © 2021 [employer]    Advance Care Planning and Advance Directives     You make decisions on a daily basis - decisions about where you want to live, your career, your home, your life. Perhaps one of the most important decisions you face is your choice for future medical care. Take time to talk with your family and your healthcare team and start planning today.  Advance Care Planning is a process that can help you:  Understand possible future healthcare decisions in light of your own experiences  Reflect on those decision in light of your goals and values  Discuss your decisions with those closest to you and the healthcare professionals that care for you  Make a plan by creating a document that reflects your wishes    Surrogate Decision Maker  In the event of a medical emergency, which has left you unable to communicate or to make your own decisions, you would need someone to make decisions for you.  It is important to discuss your preferences for medical treatment with this person while you are in good health.     Qualities of a surrogate decision maker:  Willing to take on this role and responsibility  Knows what you want for future medical care  Willing to follow your wishes even if they don't agree with them  Able to make difficult medical decisions under stressful circumstances    Advance Directives  These are legal documents you can create that will guide your healthcare team and decision maker(s) when needed. These documents can be stored in the electronic medical record.    Living Will - a legal document to guide your care if you have a terminal condition or a serious illness and  are unable to communicate. States vary by statute in document names/types, but most forms may include one or more of the following:        -  Directions regarding life-prolonging treatments        -  Directions regarding artificially provided nutrition/hydration        -  Choosing a healthcare decision maker        -  Direction regarding organ/tissue donation    Durable Power of  for Healthcare - this document names an -in-fact to make medical decisions for you, but it may also allow this person to make personal and financial decisions for you. Please seek the advice of an  if you need this type of document.    **Advance Directives are not required and no one may discriminate against you if you do not sign one.    Medical Orders  Many states allow specific forms/orders signed by your physician to record your wishes for medical treatment in your current state of health. This form, signed in personal communication with your physician, addresses resuscitation and other medical interventions that you may or may not want.      For more information or to schedule a time with a [facility] Advance Care Planning Facilitator contact: [facility]/ACP or call [phone] and someone will contact you directly.Fall Prevention in the Home, Adult  Falls can cause injuries and can happen to people of all ages. There are many things you can do to make your home safe and to help prevent falls. Ask for help when making these changes.  What actions can I take to prevent falls?  General Instructions  Use good lighting in all rooms. Replace any light bulbs that burn out.  Turn on the lights in dark areas. Use night-lights.  Keep items that you use often in easy-to-reach places. Lower the shelves around your home if needed.  Set up your furniture so you have a clear path. Avoid moving your furniture around.  Do not have throw rugs or other things on the floor that can make you trip.  Avoid walking on wet  floors.  If any of your floors are uneven, fix them.  Add color or contrast paint or tape to clearly jairon and help you see:  Grab bars or handrails.  First and last steps of staircases.  Where the edge of each step is.  If you use a stepladder:  Make sure that it is fully opened. Do not climb a closed stepladder.  Make sure the sides of the stepladder are locked in place.  Ask someone to hold the stepladder while you use it.  Know where your pets are when moving through your home.  What can I do in the bathroom?         Keep the floor dry. Clean up any water on the floor right away.  Remove soap buildup in the tub or shower.  Use nonskid mats or decals on the floor of the tub or shower.  Attach bath mats securely with double-sided, nonslip rug tape.  If you need to sit down in the shower, use a plastic, nonslip stool.  Install grab bars by the toilet and in the tub and shower. Do not use towel bars as grab bars.  What can I do in the bedroom?  Make sure that you have a light by your bed that is easy to reach.  Do not use any sheets or blankets for your bed that hang to the floor.  Have a firm chair with side arms that you can use for support when you get dressed.  What can I do in the kitchen?  Clean up any spills right away.  If you need to reach something above you, use a step stool with a grab bar.  Keep electrical cords out of the way.  Do not use floor polish or wax that makes floors slippery.  What can I do with my stairs?  Do not leave any items on the stairs.  Make sure that you have a light switch at the top and the bottom of the stairs.  Make sure that there are handrails on both sides of the stairs. Fix handrails that are broken or loose.  Install nonslip stair treads on all your stairs.  Avoid having throw rugs at the top or bottom of the stairs.  Choose a carpet that does not hide the edge of the steps on the stairs.  Check carpeting to make sure that it is firmly attached to the stairs. Fix carpet that  is loose or worn.  What can I do on the outside of my home?  Use bright outdoor lighting.  Fix the edges of walkways and driveways and fix any cracks.  Remove anything that might make you trip as you walk through a door, such as a raised step or threshold.  Trim any bushes or trees on paths to your home.  Check to see if handrails are loose or broken and that both sides of all steps have handrails.  Install guardrails along the edges of any raised decks and porches.  Clear paths of anything that can make you trip, such as tools or rocks.  Have leaves, snow, or ice cleared regularly.  Use sand or salt on paths during winter.  Clean up any spills in your garage right away. This includes grease or oil spills.  What other actions can I take?  Wear shoes that:  Have a low heel. Do not wear high heels.  Have rubber bottoms.  Feel good on your feet and fit well.  Are closed at the toe. Do not wear open-toe sandals.  Use tools that help you move around if needed. These include:  Canes.  Walkers.  Scooters.  Crutches.  Review your medicines with your doctor. Some medicines can make you feel dizzy. This can increase your chance of falling.  Ask your doctor what else you can do to help prevent falls.  Where to find more information  Centers for Disease Control and PreventionACE: www.cdc.gov  National [place] on Aging: www.galileo.nih.gov  Contact a doctor if:  You are afraid of falling at home.  You feel weak, drowsy, or dizzy at home.  You fall at home.  Summary  There are many simple things that you can do to make your home safe and to help prevent falls.  Ways to make your home safe include removing things that can make you trip and installing grab bars in the bathroom.  Ask for help when making these changes in your home.  This information is not intended to replace advice given to you by your health care provider. Make sure you discuss any questions you have with your health care provider.  Document Revised: 07/21/2021  Document Reviewed: 07/21/2021  [employer] Patient Education © 2021 [employer]
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
## 2022-09-12 ENCOUNTER — HOSPITAL ENCOUNTER (OUTPATIENT)
Dept: MRI IMAGING | Facility: HOSPITAL | Age: 67
Discharge: HOME | End: 2022-09-12
Admitting: NURSE PRACTITIONER

## 2022-09-12 DIAGNOSIS — M54.59 MECHANICAL LOW BACK PAIN: ICD-10-CM

## 2022-09-12 PROCEDURE — 72148 MRI LUMBAR SPINE W/O DYE: CPT

## 2022-10-04 ENCOUNTER — TELEPHONE (OUTPATIENT)
Dept: NEUROSURGERY | Facility: CLINIC | Age: 67
End: 2022-10-04
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
## 2022-10-12 ENCOUNTER — APPOINTMENT (OUTPATIENT)
Dept: CT IMAGING | Facility: HOSPITAL | Age: 67
End: 2022-10-12

## 2022-10-12 ENCOUNTER — HOSPITAL ENCOUNTER (EMERGENCY)
Facility: HOSPITAL | Age: 67
Discharge: HOME | End: 2022-10-12
Attending: FAMILY MEDICINE | Admitting: FAMILY MEDICINE

## 2022-10-12 ENCOUNTER — APPOINTMENT (OUTPATIENT)
Dept: GENERAL RADIOLOGY | Facility: HOSPITAL | Age: 67
End: 2022-10-12

## 2022-10-12 VITALS
OXYGEN SATURATION: 99 % | DIASTOLIC BLOOD PRESSURE: 76 MMHG | HEIGHT: 62 IN | RESPIRATION RATE: 20 BRPM | BODY MASS INDEX: 34.96 KG/M2 | SYSTOLIC BLOOD PRESSURE: 150 MMHG | TEMPERATURE: 98 F | HEART RATE: 78 BPM | WEIGHT: 190 LBS

## 2022-10-12 DIAGNOSIS — S42.032A CLOSED DISPLACED FRACTURE OF ACROMIAL END OF LEFT CLAVICLE, INITIAL ENCOUNTER: Primary | ICD-10-CM

## 2022-10-12 LAB
ALBUMIN SERPL-MCNC: 4.4 G/DL (ref 3.5–5.2)
ALBUMIN/GLOB SERPL: 1.5 G/DL
ALP SERPL-CCNC: 132 U/L (ref 39–117)
ALT SERPL W P-5'-P-CCNC: 14 U/L (ref 1–33)
ANION GAP SERPL CALCULATED.3IONS-SCNC: 9 MMOL/L (ref 5–15)
APTT PPP: 25.4 SECONDS (ref 24.1–35)
AST SERPL-CCNC: 21 U/L (ref 1–32)
BASOPHILS # BLD AUTO: 0.04 10*3/MM3 (ref 0–0.2)
BASOPHILS NFR BLD AUTO: 0.4 % (ref 0–1.5)
BILIRUB SERPL-MCNC: 0.2 MG/DL (ref 0–1.2)
BUN SERPL-MCNC: 19 MG/DL (ref 8–23)
BUN/CREAT SERPL: 29.2 (ref 7–25)
CALCIUM SPEC-SCNC: 9.4 MG/DL (ref 8.6–10.5)
CHLORIDE SERPL-SCNC: 98 MMOL/L (ref 98–107)
CO2 SERPL-SCNC: 31 MMOL/L (ref 22–29)
CREAT SERPL-MCNC: 0.65 MG/DL (ref 0.57–1)
DEPRECATED RDW RBC AUTO: 42.7 FL (ref 37–54)
EGFRCR SERPLBLD CKD-EPI 2021: 97.2 ML/MIN/1.73
EOSINOPHIL # BLD AUTO: 0.07 10*3/MM3 (ref 0–0.4)
EOSINOPHIL NFR BLD AUTO: 0.6 % (ref 0.3–6.2)
ERYTHROCYTE [DISTWIDTH] IN BLOOD BY AUTOMATED COUNT: 15.1 % (ref 12.3–15.4)
GLOBULIN UR ELPH-MCNC: 3 GM/DL
GLUCOSE SERPL-MCNC: 109 MG/DL (ref 65–99)
HCT VFR BLD AUTO: 35.8 % (ref 34–46.6)
HGB BLD-MCNC: 11 G/DL (ref 12–15.9)
IMM GRANULOCYTES # BLD AUTO: 0.05 10*3/MM3 (ref 0–0.05)
IMM GRANULOCYTES NFR BLD AUTO: 0.5 % (ref 0–0.5)
INR PPP: 0.92 (ref 0.91–1.09)
LYMPHOCYTES # BLD AUTO: 1.6 10*3/MM3 (ref 0.7–3.1)
LYMPHOCYTES NFR BLD AUTO: 14.7 % (ref 19.6–45.3)
MCH RBC QN AUTO: 24.1 PG (ref 26.6–33)
MCHC RBC AUTO-ENTMCNC: 30.7 G/DL (ref 31.5–35.7)
MCV RBC AUTO: 78.5 FL (ref 79–97)
MONOCYTES # BLD AUTO: 0.6 10*3/MM3 (ref 0.1–0.9)
MONOCYTES NFR BLD AUTO: 5.5 % (ref 5–12)
NEUTROPHILS NFR BLD AUTO: 78.3 % (ref 42.7–76)
NEUTROPHILS NFR BLD AUTO: 8.52 10*3/MM3 (ref 1.7–7)
NRBC BLD AUTO-RTO: 0 /100 WBC (ref 0–0.2)
PLATELET # BLD AUTO: 366 10*3/MM3 (ref 140–450)
PMV BLD AUTO: 9.8 FL (ref 6–12)
POTASSIUM SERPL-SCNC: 3.9 MMOL/L (ref 3.5–5.2)
PROT SERPL-MCNC: 7.4 G/DL (ref 6–8.5)
PROTHROMBIN TIME: 12 SECONDS (ref 11.9–14.6)
RBC # BLD AUTO: 4.56 10*6/MM3 (ref 3.77–5.28)
SODIUM SERPL-SCNC: 138 MMOL/L (ref 136–145)
WBC NRBC COR # BLD: 10.88 10*3/MM3 (ref 3.4–10.8)

## 2022-10-12 PROCEDURE — 70450 CT HEAD/BRAIN W/O DYE: CPT

## 2022-10-12 PROCEDURE — 71045 X-RAY EXAM CHEST 1 VIEW: CPT

## 2022-10-12 PROCEDURE — 96374 THER/PROPH/DIAG INJ IV PUSH: CPT

## 2022-10-12 PROCEDURE — 25010000002 FENTANYL CITRATE (PF) 50 MCG/ML SOLUTION: Performed by: FAMILY MEDICINE

## 2022-10-12 PROCEDURE — 99284 EMERGENCY DEPT VISIT MOD MDM: CPT

## 2022-10-12 PROCEDURE — 72125 CT NECK SPINE W/O DYE: CPT

## 2022-10-12 PROCEDURE — 85025 COMPLETE CBC W/AUTO DIFF WBC: CPT | Performed by: FAMILY MEDICINE

## 2022-10-12 PROCEDURE — 85610 PROTHROMBIN TIME: CPT | Performed by: FAMILY MEDICINE

## 2022-10-12 PROCEDURE — 85730 THROMBOPLASTIN TIME PARTIAL: CPT | Performed by: FAMILY MEDICINE

## 2022-10-12 PROCEDURE — 80053 COMPREHEN METABOLIC PANEL: CPT | Performed by: FAMILY MEDICINE

## 2022-10-12 PROCEDURE — 73030 X-RAY EXAM OF SHOULDER: CPT

## 2022-10-12 PROCEDURE — 73610 X-RAY EXAM OF ANKLE: CPT

## 2022-10-12 PROCEDURE — 36415 COLL VENOUS BLD VENIPUNCTURE: CPT

## 2022-10-12 RX ORDER — FENTANYL CITRATE 50 UG/ML
50 INJECTION, SOLUTION INTRAMUSCULAR; INTRAVENOUS ONCE
Status: COMPLETED | OUTPATIENT
Start: 2022-10-12 | End: 2022-10-12

## 2022-10-12 RX ADMIN — FENTANYL CITRATE 50 MCG: 50 INJECTION INTRAMUSCULAR; INTRAVENOUS at 19:21
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
## 2022-11-25 NOTE — PLAN OF CARE
Problem: Discharge Planning  Goal: Discharge to home or other facility with appropriate resources  6/9/2022 1946 by Tahira Hubbard RN  Outcome: Progressing  6/9/2022 1429 by Lisha Lr RN  Outcome: Progressing     Problem: Safety - Adult  Goal: Free from fall injury  6/9/2022 1946 by Tahira Hubbard RN  Outcome: Progressing  6/9/2022 1429 by Lisha Lr RN  Outcome: Progressing     Problem: ABCDS Injury Assessment  Goal: Absence of physical injury  6/9/2022 1946 by Tahira Hubbard RN  Outcome: Progressing  6/9/2022 1429 by Lisha Lr RN  Outcome: Progressing     Problem: Pain  Goal: Verbalizes/displays adequate comfort level or baseline comfort level  6/9/2022 1946 by Tahira Hubbard RN  Outcome: Progressing  6/9/2022 1429 by Lisha Lr RN  Outcome: Progressing     Problem: Chronic Conditions and Co-morbidities  Goal: Patient's chronic conditions and co-morbidity symptoms are monitored and maintained or improved  6/9/2022 1946 by Tahira Hubbard RN  Outcome: Progressing  6/9/2022 1429 by Lisha Lr RN  Outcome: Progressing No

## 2022-12-28 RX ORDER — PANTOPRAZOLE SODIUM 40 MG/1
TABLET, DELAYED RELEASE ORAL
Qty: 30 TABLET | Refills: 0 | OUTPATIENT
Start: 2022-12-28

## 2023-01-09 ENCOUNTER — OFFICE VISIT (OUTPATIENT)
Dept: NEUROSURGERY | Facility: CLINIC | Age: 68
End: 2023-01-09
Payer: MEDICARE

## 2023-01-09 VITALS — BODY MASS INDEX: 34.96 KG/M2 | WEIGHT: 190 LBS | HEIGHT: 62 IN

## 2023-01-09 DIAGNOSIS — Z78.9 NON-SMOKER: ICD-10-CM

## 2023-01-09 DIAGNOSIS — S32.040B: Primary | ICD-10-CM

## 2023-01-09 DIAGNOSIS — E66.09 CLASS 1 OBESITY DUE TO EXCESS CALORIES WITH SERIOUS COMORBIDITY AND BODY MASS INDEX (BMI) OF 34.0 TO 34.9 IN ADULT: ICD-10-CM

## 2023-01-09 PROBLEM — E66.811 CLASS 1 OBESITY DUE TO EXCESS CALORIES WITH SERIOUS COMORBIDITY AND BODY MASS INDEX (BMI) OF 34.0 TO 34.9 IN ADULT: Status: ACTIVE | Noted: 2023-01-09

## 2023-01-09 PROCEDURE — 99213 OFFICE O/P EST LOW 20 MIN: CPT | Performed by: NEUROLOGICAL SURGERY
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
## 2023-02-02 ENCOUNTER — OFFICE VISIT (OUTPATIENT)
Dept: NEUROLOGY | Facility: CLINIC | Age: 68
End: 2023-02-02
Payer: MEDICARE

## 2023-02-02 VITALS
HEART RATE: 80 BPM | OXYGEN SATURATION: 96 % | DIASTOLIC BLOOD PRESSURE: 70 MMHG | WEIGHT: 200 LBS | SYSTOLIC BLOOD PRESSURE: 122 MMHG | HEIGHT: 62 IN | BODY MASS INDEX: 36.8 KG/M2

## 2023-02-02 DIAGNOSIS — G47.19 DAYTIME HYPERSOMNOLENCE: ICD-10-CM

## 2023-02-02 DIAGNOSIS — G47.33 OBSTRUCTIVE SLEEP APNEA: Primary | ICD-10-CM

## 2023-02-02 PROCEDURE — 99213 OFFICE O/P EST LOW 20 MIN: CPT | Performed by: NURSE PRACTITIONER

## 2023-02-02 RX ORDER — AZELASTINE HYDROCHLORIDE 0.5 MG/ML
1 SOLUTION/ DROPS OPHTHALMIC
COMMUNITY
End: 2023-02-02

## 2023-02-02 RX ORDER — DULOXETIN HYDROCHLORIDE 30 MG/1
30 CAPSULE, DELAYED RELEASE ORAL DAILY
COMMUNITY
End: 2023-02-02 | Stop reason: DRUGHIGH

## 2023-02-02 RX ORDER — ROPINIROLE 0.5 MG/1
1 TABLET, FILM COATED ORAL DAILY
COMMUNITY
Start: 2022-12-28

## 2023-02-02 RX ORDER — FERROUS SULFATE 325(65) MG
1 TABLET ORAL EVERY 12 HOURS SCHEDULED
COMMUNITY
Start: 2022-12-15

## 2023-02-02 RX ORDER — CITALOPRAM 20 MG/1
1 TABLET ORAL DAILY
COMMUNITY
Start: 2023-01-28

## 2023-02-02 RX ORDER — ONDANSETRON 4 MG/1
TABLET, ORALLY DISINTEGRATING ORAL
COMMUNITY
Start: 2023-01-10 | End: 2023-02-02

## 2023-02-02 RX ORDER — LEVOTHYROXINE SODIUM 0.05 MG/1
TABLET ORAL
COMMUNITY
Start: 2022-11-23

## 2023-02-02 RX ORDER — IRBESARTAN AND HYDROCHLOROTHIAZIDE 150; 12.5 MG/1; MG/1
1 TABLET, FILM COATED ORAL DAILY
COMMUNITY
Start: 2023-01-09

## 2023-02-02 NOTE — PROGRESS NOTES
Neurology Progress Note    Chief Complaint:    Obstructive Sleep Apnea    Subjective   History of Present Illness:  Salud Daley is a 67 y.o. female who presents today for obstructive sleep apnea.  She is routinely followed by Shelton Jacobs DO for primary care.     Obstructive Sleep Apnea  She reports that she is restarted using her machine in the beginning of January.  She has been decently compliant with this.  However, she reports that she has recently retired and is having difficulties with regulation of her sleep schedule.  She reports that she is getting her days and nights mixed up as she sleeps frequently during the day due to boredom.  She reports that as a result she will stay awake during the night.  She reports that the machine awakens her at night secondary to the noise.  She states that when she lies on her left side the noise will be reduced.  She denies any snoring over therapy but she continues to have some daytime somnolence and nonrestorative sleep secondary to multiple awakenings and interrupted sleep schedule.  She does have a recalled machine that has not been replaced to date.  She has registered it with [employer] but has not heard anything regarding replacement device.  She continues to use a filter along with her tubing.  She is interested in a new machine today.  She also reports that she was told she has a deviated septum from her allergist, Dr. Valdez.    Allergies:    Formaldehyde, Fluoxetine, Latex, Nickel, Other, Paxil [paroxetine hcl], and Iodine    Medications:  Current Outpatient Medications   Medication Sig Dispense Refill   • albuterol sulfate  (90 Base) MCG/ACT inhaler Inhale 2 puffs Every 6 (Six) Hours As Needed for Wheezing. 18 g 3   • allopurinol (ZYLOPRIM) 100 MG tablet Take 100 mg by mouth Daily.     • budesonide (RINOCORT AQUA) 32 MCG/ACT nasal spray 2 sprays into the nostril(s) as directed by provider As Needed.     • cetirizine (zyrTEC) 10 MG tablet Take 10 mg  by mouth Every Night.     • cholecalciferol (VITAMIN D3) 25 MCG (1000 UT) tablet Take 1,000 Units by mouth 2 (Two) Times a Day.     • citalopram (CeleXA) 20 MG tablet Take 1 tablet by mouth Daily.     • colestipol (COLESTID) 1 G tablet Take 1 g by mouth Daily.     • FeroSul 325 (65 Fe) MG tablet Take 1 tablet by mouth Every 12 (Twelve) Hours.     • irbesartan-hydrochlorothiazide (AVALIDE) 150-12.5 MG tablet Take 1 tablet by mouth Daily.     • levothyroxine (SYNTHROID, LEVOTHROID) 50 MCG tablet TAKE 1 TABLET BY MOUTH ONCE DAILY IN THE MORNING ON AN EMPTY STOMACH     • Misc. Devices misc C-PAP     • pantoprazole (PROTONIX) 40 MG EC tablet Take 40 mg by mouth Daily.     • rOPINIRole (REQUIP) 0.5 MG tablet Take 1 tablet by mouth Daily.       No current facility-administered medications for this visit.     Current outpatient and discharge medications have been reconciled for the patient.  Reviewed by: VIN Ly    Past Medical History:  Past Medical History:   Diagnosis Date   • Asthma    • Gout    • Headache, tension-type Unknown    Occasional headaches   • Hypertension    • Hypothyroid    • Insomnia    • Macular degeneration    • Restless leg syndrome    • Sleep apnea Don't remember    In my chart   • Vision loss 1963    Wear glasses     Past Surgical History:   Procedure Laterality Date   • ANKLE SURGERY Left    • CERVICAL BIOPSY  W/ LOOP ELECTRODE EXCISION     • CHOLECYSTECTOMY     • COLONOSCOPY     • DILATATION AND CURETTAGE     • ENDOSCOPY     • HYSTERECTOMY  2008    Menorrhagia   • MOLE REMOVAL     • TUBAL ABDOMINAL LIGATION  1993   • WRIST SURGERY Left     Ganglion cyst and tumor removal      Family History   Problem Relation Age of Onset   • Heart failure Mother    • Diabetes Mother    • Dementia Mother    • COPD Father    • Diabetes Father    • Clotting disorder Sister    • Breast cancer Neg Hx    • Ovarian cancer Neg Hx    • Colon cancer Neg Hx      Social History     Tobacco Use   • Smoking  "status: Never   • Smokeless tobacco: Never   • Tobacco comments:     Second hand smoking exposure   Vaping Use   • Vaping Use: Never used   Substance Use Topics   • Alcohol use: Not Currently     Comment: Occasional   • Drug use: Never     Review of Systems   Neurological: Negative for weakness.   Psychiatric/Behavioral: Positive for sleep disturbance and stress.         Objective   Vital Signs:  Heart Rate:  [80] 80  BP: (122)/(70) 122/70      02/02/23  1020   Weight: 90.7 kg (200 lb)     157.5 cm (62\")  Body mass index is 36.58 kg/m².    Physical Exam  Vitals reviewed.   Constitutional:       Appearance: Normal appearance.   HENT:      Head: Normocephalic.      Mouth/Throat:      Pharynx: Oropharynx is clear.   Eyes:      General: Lids are normal.      Extraocular Movements: Extraocular movements intact.      Pupils: Pupils are equal, round, and reactive to light.   Cardiovascular:      Rate and Rhythm: Normal rate and regular rhythm.      Pulses: Normal pulses.   Pulmonary:      Effort: Pulmonary effort is normal.   Musculoskeletal:         General: Normal range of motion.      Cervical back: Normal range of motion and neck supple.   Skin:     General: Skin is warm and dry.      Capillary Refill: Capillary refill takes less than 2 seconds.   Neurological:      Motor: Motor strength is normal.      Coordination: Coordination is intact.      Deep Tendon Reflexes: Reflexes are normal and symmetric.   Psychiatric:         Mood and Affect: Mood normal.         Speech: Speech normal.       Neurological Exam  Mental Status  Awake, alert and oriented to person, place and time. Recent and remote memory are intact. Speech is normal. Language is fluent with no aphasia. Attention and concentration are normal.    Cranial Nerves  CN II: Visual acuity is normal. Visual fields full to confrontation.  CN III, IV, VI: Extraocular movements intact bilaterally. Normal lids and orbits bilaterally. Pupils equal round and reactive to " light bilaterally.  CN V: Facial sensation is normal.  CN VII: Full and symmetric facial movement.  CN IX, X: Palate elevates symmetrically. Normal gag reflex.  CN XI: Shoulder shrug strength is normal.  CN XII: Tongue midline without atrophy or fasciculations.    Motor   Strength is 5/5 throughout all four extremities.    Sensory  Sensation is intact to light touch, pinprick, vibration and proprioception in all four extremities.    Reflexes  Deep tendon reflexes are 2+ and symmetric in all four extremities.    Coordination    Finger-to-nose, rapid alternating movements and heel-to-shin normal bilaterally without dysmetria.    Gait  Normal casual, toe, heel and tandem gait.    Neck Circumference: 14 inches  Mallampati Score: 3    Ramsey Sleepiness Scale: 15  STOP-BANG: High    Compliance Report:  This report is for the dates of 1/2/2023-1/31/2023.  She is advised for a total of 29/30 days for 96.7% compliance.  Of those dates patient use a device for 23/30 days for 76.7% compliance greater than 4 hours.  Patient is currently set on APAP with pressures 8-16 cm H2O.  Current AHI is 7.1.     Results Review:    Lab Results   Component Value Date    GLUCOSE 109 (H) 10/12/2022    BUN 19 10/12/2022    CREATININE 0.65 10/12/2022    EGFRIFNONA 85 06/11/2021    BCR 29.2 (H) 10/12/2022    K 3.9 10/12/2022    CO2 31.0 (H) 10/12/2022    CALCIUM 9.4 10/12/2022    ALBUMIN 4.40 10/12/2022    AST 21 10/12/2022    ALT 14 10/12/2022     Lab Results   Component Value Date    WBC 10.88 (H) 10/12/2022    HGB 11.0 (L) 10/12/2022    HCT 35.8 10/12/2022    MCV 78.5 (L) 10/12/2022     10/12/2022     No results found for: CHOL, CHLPL, TRIG, HDL, LDL, LDLDIRECT  Lab Results   Component Value Date    TSH 1.340 12/31/2019     Lab Results   Component Value Date    HGBA1C 5.6 05/26/2022     No results found for: FOLATE  Lab Results   Component Value Date    VOVNLJFT91 >1000 (H) 04/25/2017        Plan .  Assessment:  Salud E Medley is a 67  y.o. female who presents with obstructive sleep apnea.  She has been poorly compliant with her CPAP up until the past month.  She restarted using her machine on January 1, 2023.  She states as a result she continues to have some sleepiness due to the machine awaking her multiple times per night.  Additionally, she has poor sleep schedule due to being retired and not having any schedule to follow off of.  She states she will frequently take naps during the day and has her day/night schedule that is extremely interrupted.  I have recommended that she set a regular sleep schedule and find hobbies and/or a schedule during the day to help keep her busy and awake.  She states she will start doing this.  Additionally, I have encouraged her to use her machine for greater than 4 hours per night to help further increase effectiveness.  She continues to have a recalled machine and I will place orders for new to machine to see if her insurance company will help cover a new one since it has yet to be replaced.  I explained all this to her in detail.  We will see her back in 6 months to see how she was doing and if she is able to obtain a new machine in that timeframe.  She is understanding.    Plan:  • Continue CPAP.  Encouraged Compliance  • New CPAP orders placed  • Recommend a regular sleep schedule and sleep hygiene  • Discussed risks of untreated sleep apnea  • Recommend regular physical activity and a balanced diet  • Call me with any questions or concerns.    The patient and I have discussed the plan of care and she is in full agreement at this time.     Follow-Up:  Return in about 6 months (around 8/2/2023) for Obstructive sleep apnea.       Nic Marti, VIN  02/02/23  11:32 CST

## 2023-05-02 ENCOUNTER — HOSPITAL ENCOUNTER (EMERGENCY)
Facility: HOSPITAL | Age: 68
Discharge: HOME | End: 2023-05-02
Attending: FAMILY MEDICINE | Admitting: FAMILY MEDICINE
Payer: MEDICARE

## 2023-05-02 ENCOUNTER — APPOINTMENT (OUTPATIENT)
Dept: CT IMAGING | Facility: HOSPITAL | Age: 68
End: 2023-05-02
Payer: MEDICARE

## 2023-05-02 VITALS
OXYGEN SATURATION: 100 % | SYSTOLIC BLOOD PRESSURE: 110 MMHG | BODY MASS INDEX: 37.54 KG/M2 | WEIGHT: 204 LBS | TEMPERATURE: 97.8 F | RESPIRATION RATE: 16 BRPM | DIASTOLIC BLOOD PRESSURE: 60 MMHG | HEIGHT: 62 IN | HEART RATE: 64 BPM

## 2023-05-02 DIAGNOSIS — R10.13 EPIGASTRIC ABDOMINAL PAIN: Primary | ICD-10-CM

## 2023-05-02 DIAGNOSIS — R11.11 VOMITING WITHOUT NAUSEA, UNSPECIFIED VOMITING TYPE: ICD-10-CM

## 2023-05-02 DIAGNOSIS — K29.70 GASTRITIS WITHOUT BLEEDING, UNSPECIFIED CHRONICITY, UNSPECIFIED GASTRITIS TYPE: ICD-10-CM

## 2023-05-02 LAB
ALBUMIN SERPL-MCNC: 4.4 G/DL (ref 3.5–5.2)
ALBUMIN/GLOB SERPL: 1.8 G/DL
ALP SERPL-CCNC: 93 U/L (ref 39–117)
ALT SERPL W P-5'-P-CCNC: 11 U/L (ref 1–33)
ANION GAP SERPL CALCULATED.3IONS-SCNC: 10 MMOL/L (ref 5–15)
APTT PPP: 25.9 SECONDS (ref 24.1–35)
AST SERPL-CCNC: 16 U/L (ref 1–32)
BASOPHILS # BLD AUTO: 0.02 10*3/MM3 (ref 0–0.2)
BASOPHILS NFR BLD AUTO: 0.2 % (ref 0–1.5)
BILIRUB SERPL-MCNC: 0.4 MG/DL (ref 0–1.2)
BUN SERPL-MCNC: 29 MG/DL (ref 8–23)
BUN/CREAT SERPL: 32.6 (ref 7–25)
CALCIUM SPEC-SCNC: 9.5 MG/DL (ref 8.6–10.5)
CHLORIDE SERPL-SCNC: 95 MMOL/L (ref 98–107)
CK SERPL-CCNC: 38 U/L (ref 20–180)
CO2 SERPL-SCNC: 28 MMOL/L (ref 22–29)
CREAT SERPL-MCNC: 0.89 MG/DL (ref 0.57–1)
D-LACTATE SERPL-SCNC: 1.2 MMOL/L (ref 0.5–2)
DEPRECATED RDW RBC AUTO: 43.2 FL (ref 37–54)
EGFRCR SERPLBLD CKD-EPI 2021: 71.2 ML/MIN/1.73
EOSINOPHIL # BLD AUTO: 0.03 10*3/MM3 (ref 0–0.4)
EOSINOPHIL NFR BLD AUTO: 0.3 % (ref 0.3–6.2)
ERYTHROCYTE [DISTWIDTH] IN BLOOD BY AUTOMATED COUNT: 13.9 % (ref 12.3–15.4)
GLOBULIN UR ELPH-MCNC: 2.5 GM/DL
GLUCOSE SERPL-MCNC: 94 MG/DL (ref 65–99)
HCT VFR BLD AUTO: 38 % (ref 34–46.6)
HGB BLD-MCNC: 12.2 G/DL (ref 12–15.9)
IMM GRANULOCYTES # BLD AUTO: 0.05 10*3/MM3 (ref 0–0.05)
IMM GRANULOCYTES NFR BLD AUTO: 0.5 % (ref 0–0.5)
INR PPP: 0.92 (ref 0.91–1.09)
LIPASE SERPL-CCNC: 37 U/L (ref 13–60)
LYMPHOCYTES # BLD AUTO: 1.17 10*3/MM3 (ref 0.7–3.1)
LYMPHOCYTES NFR BLD AUTO: 11.6 % (ref 19.6–45.3)
MAGNESIUM SERPL-MCNC: 1.7 MG/DL (ref 1.6–2.4)
MCH RBC QN AUTO: 27.5 PG (ref 26.6–33)
MCHC RBC AUTO-ENTMCNC: 32.1 G/DL (ref 31.5–35.7)
MCV RBC AUTO: 85.8 FL (ref 79–97)
MONOCYTES # BLD AUTO: 0.47 10*3/MM3 (ref 0.1–0.9)
MONOCYTES NFR BLD AUTO: 4.7 % (ref 5–12)
NEUTROPHILS NFR BLD AUTO: 8.31 10*3/MM3 (ref 1.7–7)
NEUTROPHILS NFR BLD AUTO: 82.7 % (ref 42.7–76)
NRBC BLD AUTO-RTO: 0 /100 WBC (ref 0–0.2)
PLATELET # BLD AUTO: 280 10*3/MM3 (ref 140–450)
PMV BLD AUTO: 10.3 FL (ref 6–12)
POTASSIUM SERPL-SCNC: 3.6 MMOL/L (ref 3.5–5.2)
PROT SERPL-MCNC: 6.9 G/DL (ref 6–8.5)
PROTHROMBIN TIME: 12.4 SECONDS (ref 11.8–14.8)
RBC # BLD AUTO: 4.43 10*6/MM3 (ref 3.77–5.28)
SODIUM SERPL-SCNC: 133 MMOL/L (ref 136–145)
TROPONIN T SERPL HS-MCNC: 14 NG/L
TROPONIN T SERPL HS-MCNC: 14 NG/L
WBC NRBC COR # BLD: 10.05 10*3/MM3 (ref 3.4–10.8)

## 2023-05-02 PROCEDURE — 84484 ASSAY OF TROPONIN QUANT: CPT | Performed by: FAMILY MEDICINE

## 2023-05-02 PROCEDURE — 25510000001 IOPAMIDOL 61 % SOLUTION: Performed by: FAMILY MEDICINE

## 2023-05-02 PROCEDURE — 83605 ASSAY OF LACTIC ACID: CPT | Performed by: FAMILY MEDICINE

## 2023-05-02 PROCEDURE — 83690 ASSAY OF LIPASE: CPT | Performed by: FAMILY MEDICINE

## 2023-05-02 PROCEDURE — 93005 ELECTROCARDIOGRAM TRACING: CPT | Performed by: FAMILY MEDICINE

## 2023-05-02 PROCEDURE — 96375 TX/PRO/DX INJ NEW DRUG ADDON: CPT

## 2023-05-02 PROCEDURE — 36415 COLL VENOUS BLD VENIPUNCTURE: CPT

## 2023-05-02 PROCEDURE — 93010 ELECTROCARDIOGRAM REPORT: CPT | Performed by: STUDENT IN AN ORGANIZED HEALTH CARE EDUCATION/TRAINING PROGRAM

## 2023-05-02 PROCEDURE — 82550 ASSAY OF CK (CPK): CPT | Performed by: FAMILY MEDICINE

## 2023-05-02 PROCEDURE — 80053 COMPREHEN METABOLIC PANEL: CPT | Performed by: FAMILY MEDICINE

## 2023-05-02 PROCEDURE — 85730 THROMBOPLASTIN TIME PARTIAL: CPT | Performed by: FAMILY MEDICINE

## 2023-05-02 PROCEDURE — 85025 COMPLETE CBC W/AUTO DIFF WBC: CPT | Performed by: FAMILY MEDICINE

## 2023-05-02 PROCEDURE — 74177 CT ABD & PELVIS W/CONTRAST: CPT

## 2023-05-02 PROCEDURE — 96374 THER/PROPH/DIAG INJ IV PUSH: CPT

## 2023-05-02 PROCEDURE — 99283 EMERGENCY DEPT VISIT LOW MDM: CPT

## 2023-05-02 PROCEDURE — 25010000002 DIPHENHYDRAMINE PER 50 MG: Performed by: FAMILY MEDICINE

## 2023-05-02 PROCEDURE — 25010000002 ONDANSETRON PER 1 MG: Performed by: FAMILY MEDICINE

## 2023-05-02 PROCEDURE — 83735 ASSAY OF MAGNESIUM: CPT | Performed by: FAMILY MEDICINE

## 2023-05-02 PROCEDURE — 85610 PROTHROMBIN TIME: CPT | Performed by: FAMILY MEDICINE

## 2023-05-02 RX ORDER — DIPHENHYDRAMINE HYDROCHLORIDE 50 MG/ML
25 INJECTION INTRAMUSCULAR; INTRAVENOUS ONCE
Status: COMPLETED | OUTPATIENT
Start: 2023-05-02 | End: 2023-05-02

## 2023-05-02 RX ORDER — SODIUM CHLORIDE 0.9 % (FLUSH) 0.9 %
10 SYRINGE (ML) INJECTION AS NEEDED
Status: DISCONTINUED | OUTPATIENT
Start: 2023-05-02 | End: 2023-05-02 | Stop reason: HOSPADM

## 2023-05-02 RX ORDER — ONDANSETRON 4 MG/1
4 TABLET, ORALLY DISINTEGRATING ORAL EVERY 8 HOURS PRN
Qty: 12 TABLET | Refills: 0 | Status: SHIPPED | OUTPATIENT
Start: 2023-05-02

## 2023-05-02 RX ORDER — ONDANSETRON 2 MG/ML
4 INJECTION INTRAMUSCULAR; INTRAVENOUS ONCE
Status: COMPLETED | OUTPATIENT
Start: 2023-05-02 | End: 2023-05-02

## 2023-05-02 RX ADMIN — ONDANSETRON 4 MG: 2 INJECTION INTRAMUSCULAR; INTRAVENOUS at 18:22

## 2023-05-02 RX ADMIN — IOPAMIDOL 100 ML: 612 INJECTION, SOLUTION INTRAVENOUS at 19:01

## 2023-05-02 RX ADMIN — DIPHENHYDRAMINE HYDROCHLORIDE 25 MG: 50 INJECTION, SOLUTION INTRAMUSCULAR; INTRAVENOUS at 18:22

## 2023-05-02 NOTE — ED PROVIDER NOTES
Subjective   History of Present Illness  67-year-old female comes in with complaints of epigastric abdominal pain and episode of vomiting where it was dark black-colored vomit she states.  Patient states that she was trimming the hedges and then after she was trimming the hedges, and she started get some abdominal pain.  Patient states that she has not had any more episodes of vomiting.  Patient has no diarrhea or bloody stools.  Patient states that she does have a history of constipation.  Patient did have a normal bowel movement today.  Patient is unable to describe the character of the abdominal pain.  Patient denies chest pain or shortness of breath.  Patient denies any fevers or chills.  Patient denies any other symptoms at this time.        Review of Systems   Gastrointestinal: Positive for abdominal pain and vomiting.   All other systems reviewed and are negative.      Past Medical History:   Diagnosis Date   • Asthma    • Gout    • Headache, tension-type Unknown    Occasional headaches   • Hypertension    • Hypothyroid    • Insomnia    • Macular degeneration    • Restless leg syndrome    • Sleep apnea Don't remember    In my chart   • Vision loss 1963    Wear glasses       Allergies   Allergen Reactions   • Formaldehyde Rash   • Fluoxetine Other (See Comments)     Adverse reaction-suicidal   • Latex Other (See Comments)     Gloves turn skin red   • Nickel    • Other Other (See Comments)     Pt states she is allergic to plastic, reaction -makes skin red    • Paxil [Paroxetine Hcl] Mental Status Change   • Iodine Rash       Past Surgical History:   Procedure Laterality Date   • ANKLE SURGERY Left    • CERVICAL BIOPSY  W/ LOOP ELECTRODE EXCISION     • CHOLECYSTECTOMY     • COLONOSCOPY     • DILATATION AND CURETTAGE     • ENDOSCOPY     • HYSTERECTOMY  2008    Menorrhagia   • MOLE REMOVAL     • TUBAL ABDOMINAL LIGATION  1993   • WRIST SURGERY Left     Ganglion cyst and tumor removal        Family History    Problem Relation Age of Onset   • Heart failure Mother    • Diabetes Mother    • Dementia Mother    • COPD Father    • Diabetes Father    • Clotting disorder Sister    • Breast cancer Neg Hx    • Ovarian cancer Neg Hx    • Colon cancer Neg Hx        Social History     Socioeconomic History   • Marital status:    Tobacco Use   • Smoking status: Never   • Smokeless tobacco: Never   • Tobacco comments:     Second hand smoking exposure   Vaping Use   • Vaping Use: Never used   Substance and Sexual Activity   • Alcohol use: Not Currently     Comment: Occasional   • Drug use: Never   • Sexual activity: Yes     Partners: Male     Birth control/protection: Surgical, Hysterectomy     Comment: Full hysterectomy           Objective   Physical Exam  Vitals and nursing note reviewed.   Constitutional:       General: She is not in acute distress.     Appearance: She is well-developed. She is not toxic-appearing or diaphoretic.   HENT:      Head: Normocephalic and atraumatic.      Mouth/Throat:      Mouth: Mucous membranes are moist.   Eyes:      General: No scleral icterus.     Extraocular Movements: Extraocular movements intact.      Pupils: Pupils are equal, round, and reactive to light.   Cardiovascular:      Rate and Rhythm: Normal rate and regular rhythm.      Heart sounds: Normal heart sounds.   Pulmonary:      Effort: Pulmonary effort is normal.      Breath sounds: Normal breath sounds.   Abdominal:      General: Bowel sounds are normal.      Palpations: Abdomen is soft.      Tenderness: There is abdominal tenderness in the epigastric area. There is no guarding or rebound.   Skin:     General: Skin is warm and dry.   Neurological:      General: No focal deficit present.      Mental Status: She is alert and oriented to person, place, and time.   Psychiatric:         Mood and Affect: Mood normal.         Behavior: Behavior normal.         Procedures           ED Course  ED Course as of 05/02/23 2037   Tue May 02,  2023 1842 EKG rate 76  Normal sinus rhythm  No STEMI [RP]      ED Course User Index  [RP] Byron Turner MD                                         Lab Results (last 24 hours)     Procedure Component Value Units Date/Time    CBC & Differential [[id number]]  (Abnormal) Collected: 05/02/23 1813    Specimen: Blood Updated: 05/02/23 1821    Narrative:      The following orders were created for panel order CBC & Differential.  Procedure                               Abnormality         Status                     ---------                               -----------         ------                     CBC Auto Differential[[id number]]        Abnormal            Final result                 Please view results for these tests on the individual orders.    Comprehensive Metabolic Panel [[id number]]  (Abnormal) Collected: 05/02/23 1813    Specimen: Blood Updated: 05/02/23 1841     Glucose 94 mg/dL      BUN 29 mg/dL      Creatinine 0.89 mg/dL      Sodium 133 mmol/L      Potassium 3.6 mmol/L      Chloride 95 mmol/L      CO2 28.0 mmol/L      Calcium 9.5 mg/dL      Total Protein 6.9 g/dL      Albumin 4.4 g/dL      ALT (SGPT) 11 U/L      AST (SGOT) 16 U/L      Alkaline Phosphatase 93 U/L      Total Bilirubin 0.4 mg/dL      Globulin 2.5 gm/dL      A/G Ratio 1.8 g/dL      BUN/Creatinine Ratio 32.6     Anion Gap 10.0 mmol/L      eGFR 71.2 mL/min/1.73     Narrative:      GFR Normal >60  Chronic Kidney Disease <60  Kidney Failure <15      Protime-INR [[id number]]  (Normal) Collected: 05/02/23 1813    Specimen: Blood Updated: 05/02/23 1834     Protime 12.4 Seconds      INR 0.92    aPTT [[id number]]  (Normal) Collected: 05/02/23 1813    Specimen: Blood Updated: 05/02/23 1834     PTT 25.9 seconds     Lipase [[id number]]  (Normal) Collected: 05/02/23 1813    Specimen: Blood Updated: 05/02/23 1836     Lipase 37 U/L     Lactic Acid, Plasma [[id number]]  (Normal) Collected: 05/02/23 1813    Specimen: Blood Updated: 05/02/23 1839     Lactate 1.2 mmol/L      CK [[id number]]  (Normal) Collected: 05/02/23 1813    Specimen: Blood Updated: 05/02/23 1841     Creatine Kinase 38 U/L     Magnesium [[id number]]  (Normal) Collected: 05/02/23 1813    Specimen: Blood Updated: 05/02/23 1836     Magnesium 1.7 mg/dL     Single High Sensitivity Troponin T [[id number]]  (Abnormal) Collected: 05/02/23 1813    Specimen: Blood Updated: 05/02/23 1839     HS Troponin T 14 ng/L     Narrative:      High Sensitive Troponin T Reference Range:  <10.0 ng/L- Negative Female for AMI  <15.0 ng/L- Negative Male for AMI  >=10 - Abnormal Female indicating possible myocardial injury.  >=15 - Abnormal Male indicating possible myocardial injury.   Clinicians would have to utilize clinical acumen, EKG, Troponin, and serial changes to determine if it is an Acute Myocardial Infarction or myocardial injury due to an underlying chronic condition.         CBC Auto Differential [[id number]]  (Abnormal) Collected: 05/02/23 1813    Specimen: Blood Updated: 05/02/23 1821     WBC 10.05 10*3/mm3      RBC 4.43 10*6/mm3      Hemoglobin 12.2 g/dL      Hematocrit 38.0 %      MCV 85.8 fL      MCH 27.5 pg      MCHC 32.1 g/dL      RDW 13.9 %      RDW-SD 43.2 fl      MPV 10.3 fL      Platelets 280 10*3/mm3      Neutrophil % 82.7 %      Lymphocyte % 11.6 %      Monocyte % 4.7 %      Eosinophil % 0.3 %      Basophil % 0.2 %      Immature Grans % 0.5 %      Neutrophils, Absolute 8.31 10*3/mm3      Lymphocytes, Absolute 1.17 10*3/mm3      Monocytes, Absolute 0.47 10*3/mm3      Eosinophils, Absolute 0.03 10*3/mm3      Basophils, Absolute 0.02 10*3/mm3      Immature Grans, Absolute 0.05 10*3/mm3      nRBC 0.0 /100 WBC     Single High Sensitivity Troponin T [[id number]]  (Abnormal) Collected: 05/02/23 1943    Specimen: Blood Updated: 05/02/23 2010     HS Troponin T 14 ng/L     Narrative:      High Sensitive Troponin T Reference Range:  <10.0 ng/L- Negative Female for AMI  <15.0 ng/L- Negative Male for AMI  >=10 - Abnormal Female  indicating possible myocardial injury.  >=15 - Abnormal Male indicating possible myocardial injury.   Clinicians would have to utilize clinical acumen, EKG, Troponin, and serial changes to determine if it is an Acute Myocardial Infarction or myocardial injury due to an underlying chronic condition.               CT Abdomen Pelvis With Contrast   Final Result       1.  Mild wall thickening of the gastric antrum. This may be related to   focal gastritis or gastric ulcer disease. Also in the differential, this   could be artifactual secondary to partially decompressed state.       2.  No other acute finding.   This report was finalized on 05/02/2023 19:15 by Dr. Mark Danielle MD.          Medications   sodium chloride 0.9 % flush 10 mL (has no administration in time range)   ondansetron (ZOFRAN) injection 4 mg (4 mg Intravenous Given 5/2/23 1822)   diphenhydrAMINE (BENADRYL) injection 25 mg (25 mg Intravenous Given 5/2/23 1822)   iopamidol (ISOVUE-300) 61 % injection 100 mL (100 mL Intravenous Given 5/2/23 1901)       Medical Decision Making  67-year-old female came to the emergency room with epigastric pain.  Patient had no episodes of vomiting here in the emergency room.  Patient is resting comfortably nontoxic-appearing.  Patient is in no acute distress.  Patient CT scan of the abdomen pelvis with IV contrast showed possible gastritis.  Patient had no other acute abnormalities on the CT scan of the abdomen pelvis.  Patient was given Benadryl prior to getting her contrast due to an iodine allergy.  Patient tolerated the IV contrast well.  Patient had all questions answered for her prior to discharge.  Patient's initial troponin was 14.  Patient's repeat troponin was 14.  Patient EKG was negative for any acute findings.  Patient does not appear to be any acute coronary syndrome.  Patient will be discharged home and advised to follow-up with primary care provider on an outpatient basis.  Patient was advised to return  to the emergency room with new or worsening of symptoms.  Patient's  was present bedside all questions were answered for him prior to discharge as well.  Both verbalized understanding and agreeable to plan as discussed.    Epigastric abdominal pain: acute illness or injury  Gastritis without bleeding, unspecified chronicity, unspecified gastritis type: acute illness or injury  Vomiting without nausea, unspecified vomiting type: acute illness or injury  Amount and/or Complexity of Data Reviewed  Labs: ordered. Decision-making details documented in ED Course.  Radiology: ordered. Decision-making details documented in ED Course.  ECG/medicine tests: ordered. Decision-making details documented in ED Course.      Risk  Prescription drug management.          Final diagnoses:   Epigastric abdominal pain   Gastritis without bleeding, unspecified chronicity, unspecified gastritis type   Vomiting without nausea, unspecified vomiting type       ED Disposition  ED Disposition     ED Disposition   Discharge    Condition   Stable    Comment   --             Shelton Jacobs, DO  [address]   [facility] [address]  [phone]    Schedule an appointment as soon as possible for a visit       [facility] Emergency Department  [address] [place] [id number] [phone]    As needed, If symptoms worsen         Medication List      New Prescriptions    ondansetron ODT 4 MG disintegrating tablet  Commonly known as: ZOFRAN-ODT  Place 1 tablet on the tongue Every 8 (Eight) Hours As Needed for Nausea or Vomiting.           Where to Get Your Medications      These medications were sent to [facility] [address] [place] [address] [address] [facility] - [phone] [facility] [phone]   [address] [facility] [address]    Phone: [phone]   · ondansetron ODT 4 MG disintegrating tablet          Byron Turner MD  05/02/23 2037

## 2023-05-03 LAB
QT INTERVAL: 420 MS
QTC INTERVAL: 472 MS

## 2023-05-17 NOTE — PROGRESS NOTES
"Chief Complaint   Patient presents with    Abdominal Pain     Upper stomach pain was throwing up dark stuff went to er       PCP: Shelton Jacobs, DO  REFER: No ref. provider found    Subjective     HPI    ER evaluation [facility] 5/2/23 for complains of ALEXX pain with black emesis.  No further emesis.  She had sharp upper abdominal pain same time of emesis.   CT Abdomen Pelvis With Contrast (05/02/2023 19:00)  at ER evaluation showed Mild wall thickening of the gastric antrum.   Denies difficulty with heartburn.  She is compliant with daily Protonix (taken for 10 years).    She has occasional difficulty swallowing solid foods felt in  upper esophagus.   Time provides relief.   She passes stool daily but stool is described as \"pellets\" and small in amount.   Stool described as \"kind of black now.\"   Denies frequent use of NSAIDs.     EGD 2013-Dr Mansfield , negative   Colonoscopy Dr Mansfield 2013 (personal history colon polyp)    Lab Results - Last 18 Months   Lab Units 05/02/23  1813 10/12/22  1900 06/09/22  0257 06/06/22  0323 06/03/22 2026 05/25/22  1121   HEMOGLOBIN g/dL 12.2 11.0* 9.5* 10.3* 9.7* 11.5*   HEMATOCRIT % 38.0 35.8 31.8* 34.6* 32.3* 36.7   MCV fL 85.8 78.5* 87.4 88.3 86.4 84.0   WBC 10*3/mm3 10.05 10.88* 5.5 9.1 7.6 20.09*   PLATELETS 10*3/mm3 280 366 329 369 394 383       Lab Results - Last 18 Months   Lab Units 05/02/23  1813 10/12/22  1900 05/29/22  0206 05/28/22  0105 05/27/22  0100 05/26/22  0203 05/25/22  1833 05/25/22  1121   GLUCOSE mg/dL 94 109*  --   --   --   --   --  151*   SODIUM mmol/L 133* 138 139 140 143 139   < > 140   POTASSIUM mmol/L 3.6 3.9 4.5 4.4 4.2 4.6   < > 3.8   CREATININE mg/dL 0.89 0.65 0.67 0.71 0.68 0.75   < > 0.81   BUN mg/dL 29* 19 25 29* 19 23   < > 23   BUN / CREAT RATIO  32.6* 29.2*  --   --   --   --   --  28.4*   ALK PHOS U/L 93 132*  --   --   --   --   --  118*   ALT (SGPT) U/L 11 14  --   --   --   --   --  37*   AST (SGOT) U/L 16 21  --   --   --   --   " --  70*   BILIRUBIN mg/dL 0.4 0.2  --   --   --   --   --  0.4   ALBUMIN g/dL 4.4 4.40  --   --   --   --   --  4.10    < > = values in this interval not displayed.       No results for input(s): EGFRIFNONA, EGFRIFAFRI, EGFRRESULT in the last 75133 hours.    No results for input(s): IRON, TIBC, LABIRON, FERRITIN, F6OZSEH, TSH, FOLATE in the last 79219 hours.    Invalid input(s): VITB12      Past Medical History:   Diagnosis Date    Asthma     Gout     Headache, tension-type Unknown    Occasional headaches    Hypertension     Hypothyroid     Insomnia     Macular degeneration     Restless leg syndrome     Sleep apnea Don't remember    In my chart    Vision loss 1963    Wear glasses       Past Surgical History:   Procedure Laterality Date    ANKLE SURGERY Left     CERVICAL BIOPSY  W/ LOOP ELECTRODE EXCISION      CHOLECYSTECTOMY      COLONOSCOPY      DILATATION AND CURETTAGE      ENDOSCOPY      HYSTERECTOMY  2008    Menorrhagia    MOLE REMOVAL      TUBAL ABDOMINAL LIGATION  1993    WRIST SURGERY Left     Ganglion cyst and tumor removal        Outpatient Medications Marked as Taking for the 5/18/23 encounter (Office Visit) with Rolly Keenan APRN   Medication Sig Dispense Refill    albuterol sulfate  (90 Base) MCG/ACT inhaler Inhale 2 puffs Every 6 (Six) Hours As Needed for Wheezing. 18 g 3    allopurinol (ZYLOPRIM) 100 MG tablet Take 1 tablet by mouth Daily.      amLODIPine (NORVASC) 5 MG tablet Take 1 tablet by mouth Daily.      budesonide (RINOCORT AQUA) 32 MCG/ACT nasal spray 2 sprays into the nostril(s) as directed by provider As Needed.      cetirizine (zyrTEC) 10 MG tablet Take 1 tablet by mouth Every Night.      cholecalciferol (VITAMIN D3) 25 MCG (1000 UT) tablet Take 1 tablet by mouth 2 (Two) Times a Day.      citalopram (CeleXA) 20 MG tablet Take 1 tablet by mouth Daily.      colestipol (COLESTID) 1 G tablet Take 1 tablet by mouth Daily.      irbesartan-hydrochlorothiazide (AVALIDE) 150-12.5 MG  "tablet Take 1 tablet by mouth Daily.      levothyroxine (SYNTHROID, LEVOTHROID) 50 MCG tablet TAKE 1 TABLET BY MOUTH ONCE DAILY IN THE MORNING ON AN EMPTY STOMACH      pantoprazole (PROTONIX) 40 MG EC tablet Take 1 tablet by mouth Daily.      rOPINIRole (REQUIP) 0.5 MG tablet Take 1 tablet by mouth Daily.         Allergies   Allergen Reactions    Formaldehyde Rash    Fluoxetine Other (See Comments)     Adverse reaction-suicidal    Latex Other (See Comments)     Gloves turn skin red    Nickel     Other Other (See Comments)     Pt states she is allergic to plastic, reaction -makes skin red     Paxil [Paroxetine Hcl] Mental Status Change    Iodine Rash       Social History     Socioeconomic History    Marital status:    Tobacco Use    Smoking status: Never    Smokeless tobacco: Never    Tobacco comments:     Second hand smoking exposure   Vaping Use    Vaping Use: Never used   Substance and Sexual Activity    Alcohol use: Not Currently     Comment: Occasional    Drug use: Never    Sexual activity: Yes     Partners: Male     Birth control/protection: Surgical, Hysterectomy     Comment: Full hysterectomy       Review of Systems   Constitutional:  Negative for unexpected weight change.   HENT:  Positive for trouble swallowing.    Respiratory:  Negative for shortness of breath.    Cardiovascular:  Negative for chest pain.   Gastrointestinal:  Negative for abdominal pain and anal bleeding.     Objective     Vitals:    05/18/23 0847   BP: 120/68   Pulse: 87   Temp: 97.3 °F (36.3 °C)   SpO2: 98%   Weight: 92.5 kg (204 lb)   Height: 157.5 cm (62\")     Body mass index is 37.31 kg/m².    Physical Exam  Constitutional:       Appearance: Normal appearance. She is well-developed.   Eyes:      General: No scleral icterus.  Cardiovascular:      Heart sounds: Normal heart sounds. No murmur heard.  Pulmonary:      Effort: Pulmonary effort is normal.   Abdominal:      General: Bowel sounds are normal. There is no distension.      " Palpations: Abdomen is soft.      Tenderness: There is no abdominal tenderness. There is no guarding.   Skin:     General: Skin is warm and dry.      Coloration: Skin is not jaundiced.   Neurological:      Mental Status: She is alert.   Psychiatric:         Behavior: Behavior is cooperative.       Imaging Results (Most Recent)       None            Body mass index is 37.31 kg/m².    Assessment & Plan     Diagnoses and all orders for this visit:    1. Abnormal CT of the abdomen (Primary)  -     Case Request; Standing  -     Implement Anesthesia Orders Day of Procedure; Standing  -     Obtain Informed Consent; Standing  -     Case Request    2. History of colon polyps  -     Case Request; Standing  -     Implement Anesthesia Orders Day of Procedure; Standing  -     Obtain Informed Consent; Standing  -     Case Request    3. Gastroesophageal reflux disease, unspecified whether esophagitis present         COLONOSCOPY WITH ANESTHESIA (N/A)    Miralax prep     Recommend daily use of Miralax, adjust as needed  Encouraged addition of dietary fiber, increasing daily water consumption, as well daily physical activity    I have suggested utilizing Miralax starting 7 days prior to colonoscopy to help improve prep.  I have explained stool may be loose but we do not want Salud Daley to be uncomfortable or experiencing fecal incontinence.  Miralax should be adjusted as needed.      Take PPI 30 min prior to breakfast   Decrease caffeine, nicotine, etoh- all contribute to acid reflux  Do not eat 2-3 hours within lying down, elevated HOB 4-6 inches     Advised pt to stop use of NSAIDs, Fish Oil, and MV 5 days prior to procedure, per Dr Lemus protocol.  Tylenol based products are ok to take.  Pt verbalized understanding.    The risks of the endoscopy were discussed in detail.  We discussed the risks of perforation (one out of 9293-6360, riskier with dilation), bleeding (one out of 500), and the rare risks of infection, adverse  reaction to anesthesia, respiratory failure, cardiac failure including MI and adverse reaction to medications, etc.  We discussed consequences that could occur if a risk were to develop such as the need for hospitalization, blood transfusion, surgical intervention, medications, pain and disability and death.  Alternatives include not doing anything, or pursuing an UGI series which only offers a diagnosis with potential less accuracy compared to EGD.  The patient verbalizes understanding and agrees to proceed.      All risks, benefits, alternatives, and indications of colonoscopy procedure have been discussed with the patient. Risks to include perforation of the colon requiring possible surgery or colostomy, risk of bleeding from biopsies or removal of colon tissue, possibility of missing a colon polyp or cancer, or adverse drug reaction.  Benefits to include the diagnosis and management of disease of the colon and rectum. Alternatives to include barium enema, radiographic evaluation, lab testing or no intervention. Pt verbalizes understanding and agrees to proceed with procedure.        Rolly Keenan, APRN  05/18/23          Patient Instructions   Gastroesophageal Reflux Disease, Adult    Gastroesophageal reflux disease (GERD) happens when acid from your stomach flows up into the esophagus. When acid comes in contact with the esophagus, the acid causes soreness (inflammation) in the esophagus. Over time, GERD may create small holes (ulcers) in the lining of the esophagus.  CAUSES    Increased body weight. This puts pressure on the stomach, making acid rise from the stomach into the esophagus.  Smoking. This increases acid production in the stomach.  Drinking alcohol. This causes decreased pressure in the lower esophageal sphincter (valve or ring of muscle between the esophagus and stomach), allowing acid from the stomach into the esophagus.  Late evening meals and a full stomach. This increases pressure and  acid production in the stomach.  A malformed lower esophageal sphincter.  Sometimes, no cause is found.  SYMPTOMS    Burning pain in the lower part of the mid-chest behind the breastbone and in the mid-stomach area. This may occur twice a week or more often.  Trouble swallowing.  Sore throat.  Dry cough.  Asthma-like symptoms including chest tightness, shortness of breath, or wheezing.  DIAGNOSIS    Your caregiver may be able to diagnose GERD based on your symptoms. In some cases, X-rays and other tests may be done to check for complications or to check the condition of your stomach and esophagus.  TREATMENT    Your caregiver may recommend over-the-counter or prescription medicines to help decrease acid production. Ask your caregiver before starting or adding any new medicines.    HOME CARE INSTRUCTIONS    Change the factors that you can control. Ask your caregiver for guidance concerning weight loss, quitting smoking, and alcohol consumption.  Avoid foods and drinks that make your symptoms worse, such as:  Caffeine or alcoholic drinks.  Chocolate.  Peppermint or mint flavorings.  Garlic and onions.  Spicy foods.  Citrus fruits, such as oranges, romi, or limes.  Tomato-based foods such as sauce, chili, salsa, and pizza.  Fried and fatty foods.  Avoid lying down for the 3 hours prior to your bedtime or prior to taking a nap.  Eat small, frequent meals instead of large meals.  Wear loose-fitting clothing. Do not wear anything tight around your waist that causes pressure on your stomach.  Raise the head of your bed 6 to 8 inches with wood blocks to help you sleep. Extra pillows will not help.  Only take over-the-counter or prescription medicines for pain, discomfort, or fever as directed by your caregiver.  Do not take aspirin, ibuprofen, or other nonsteroidal anti-inflammatory drugs (NSAIDs).  SEEK IMMEDIATE MEDICAL CARE IF:    You have pain in your arms, neck, jaw, teeth, or back.  Your pain increases or changes in  intensity or duration.  You develop nausea, vomiting, or sweating (diaphoresis).  You develop shortness of breath, or you faint.  Your vomit is green, yellow, black, or looks like coffee grounds or blood.  Your stool is red, bloody, or black.  These symptoms could be signs of other problems, such as heart disease, gastric bleeding, or esophageal bleeding.  MAKE SURE YOU:    Understand these instructions.  Will watch your condition.  Will get help right away if you are not doing well or get worse.     This information is not intended to replace advice given to you by your health care provider. Make sure you discuss any questions you have with your health care provider.     Document Released: 09/27/2006 Document Revised: 01/08/2016 Document Reviewed: 04/13/2016  [employer] Interactive Patient Education ©2016 [employer]    Gastroesophageal Reflux Disease, Adult    Gastroesophageal reflux disease (GERD) happens when acid from your stomach flows up into the esophagus. When acid comes in contact with the esophagus, the acid causes soreness (inflammation) in the esophagus. Over time, GERD may create small holes (ulcers) in the lining of the esophagus.  CAUSES    Increased body weight. This puts pressure on the stomach, making acid rise from the stomach into the esophagus.  Smoking. This increases acid production in the stomach.  Drinking alcohol. This causes decreased pressure in the lower esophageal sphincter (valve or ring of muscle between the esophagus and stomach), allowing acid from the stomach into the esophagus.  Late evening meals and a full stomach. This increases pressure and acid production in the stomach.  A malformed lower esophageal sphincter.  Sometimes, no cause is found.  SYMPTOMS    Burning pain in the lower part of the mid-chest behind the breastbone and in the mid-stomach area. This may occur twice a week or more often.  Trouble swallowing.  Sore throat.  Dry cough.  Asthma-like symptoms including  chest tightness, shortness of breath, or wheezing.  DIAGNOSIS    Your caregiver may be able to diagnose GERD based on your symptoms. In some cases, X-rays and other tests may be done to check for complications or to check the condition of your stomach and esophagus.  TREATMENT    Your caregiver may recommend over-the-counter or prescription medicines to help decrease acid production. Ask your caregiver before starting or adding any new medicines.    HOME CARE INSTRUCTIONS    Change the factors that you can control. Ask your caregiver for guidance concerning weight loss, quitting smoking, and alcohol consumption.  Avoid foods and drinks that make your symptoms worse, such as:  Caffeine or alcoholic drinks.  Chocolate.  Peppermint or mint flavorings.  Garlic and onions.  Spicy foods.  Citrus fruits, such as oranges, romi, or limes.  Tomato-based foods such as sauce, chili, salsa, and pizza.  Fried and fatty foods.  Avoid lying down for the 3 hours prior to your bedtime or prior to taking a nap.  Eat small, frequent meals instead of large meals.  Wear loose-fitting clothing. Do not wear anything tight around your waist that causes pressure on your stomach.  Raise the head of your bed 6 to 8 inches with wood blocks to help you sleep. Extra pillows will not help.  Only take over-the-counter or prescription medicines for pain, discomfort, or fever as directed by your caregiver.  Do not take aspirin, ibuprofen, or other nonsteroidal anti-inflammatory drugs (NSAIDs).  SEEK IMMEDIATE MEDICAL CARE IF:    You have pain in your arms, neck, jaw, teeth, or back.  Your pain increases or changes in intensity or duration.  You develop nausea, vomiting, or sweating (diaphoresis).  You develop shortness of breath, or you faint.  Your vomit is green, yellow, black, or looks like coffee grounds or blood.  Your stool is red, bloody, or black.  These symptoms could be signs of other problems, such as heart disease, gastric bleeding, or  esophageal bleeding.  MAKE SURE YOU:    Understand these instructions.  Will watch your condition.  Will get help right away if you are not doing well or get worse.     This information is not intended to replace advice given to you by your health care provider. Make sure you discuss any questions you have with your health care provider.     Document Released: 09/27/2006 Document Revised: 01/08/2016 Document Reviewed: 04/13/2016  [employer] Interactive Patient Education ©2016 [employer]

## 2023-05-17 NOTE — H&P (VIEW-ONLY)
"Chief Complaint   Patient presents with    Abdominal Pain     Upper stomach pain was throwing up dark stuff went to er       PCP: Shelton Jacobs, DO  REFER: No ref. provider found    Subjective     HPI    ER evaluation [facility] 5/2/23 for complains of ALEXX pain with black emesis.  No further emesis.  She had sharp upper abdominal pain same time of emesis.   CT Abdomen Pelvis With Contrast (05/02/2023 19:00)  at ER evaluation showed Mild wall thickening of the gastric antrum.   Denies difficulty with heartburn.  She is compliant with daily Protonix (taken for 10 years).    She has occasional difficulty swallowing solid foods felt in  upper esophagus.   Time provides relief.   She passes stool daily but stool is described as \"pellets\" and small in amount.   Stool described as \"kind of black now.\"   Denies frequent use of NSAIDs.     EGD 2013-Dr Mansfield , negative   Colonoscopy Dr Mansfield 2013 (personal history colon polyp)    Lab Results - Last 18 Months   Lab Units 05/02/23  1813 10/12/22  1900 06/09/22  0257 06/06/22  0323 06/03/22 2026 05/25/22  1121   HEMOGLOBIN g/dL 12.2 11.0* 9.5* 10.3* 9.7* 11.5*   HEMATOCRIT % 38.0 35.8 31.8* 34.6* 32.3* 36.7   MCV fL 85.8 78.5* 87.4 88.3 86.4 84.0   WBC 10*3/mm3 10.05 10.88* 5.5 9.1 7.6 20.09*   PLATELETS 10*3/mm3 280 366 329 369 394 383       Lab Results - Last 18 Months   Lab Units 05/02/23  1813 10/12/22  1900 05/29/22  0206 05/28/22  0105 05/27/22  0100 05/26/22  0203 05/25/22  1833 05/25/22  1121   GLUCOSE mg/dL 94 109*  --   --   --   --   --  151*   SODIUM mmol/L 133* 138 139 140 143 139   < > 140   POTASSIUM mmol/L 3.6 3.9 4.5 4.4 4.2 4.6   < > 3.8   CREATININE mg/dL 0.89 0.65 0.67 0.71 0.68 0.75   < > 0.81   BUN mg/dL 29* 19 25 29* 19 23   < > 23   BUN / CREAT RATIO  32.6* 29.2*  --   --   --   --   --  28.4*   ALK PHOS U/L 93 132*  --   --   --   --   --  118*   ALT (SGPT) U/L 11 14  --   --   --   --   --  37*   AST (SGOT) U/L 16 21  --   --   --   --   " --  70*   BILIRUBIN mg/dL 0.4 0.2  --   --   --   --   --  0.4   ALBUMIN g/dL 4.4 4.40  --   --   --   --   --  4.10    < > = values in this interval not displayed.       No results for input(s): EGFRIFNONA, EGFRIFAFRI, EGFRRESULT in the last 11385 hours.    No results for input(s): IRON, TIBC, LABIRON, FERRITIN, R6OEQAN, TSH, FOLATE in the last 89930 hours.    Invalid input(s): VITB12      Past Medical History:   Diagnosis Date    Asthma     Gout     Headache, tension-type Unknown    Occasional headaches    Hypertension     Hypothyroid     Insomnia     Macular degeneration     Restless leg syndrome     Sleep apnea Don't remember    In my chart    Vision loss 1963    Wear glasses       Past Surgical History:   Procedure Laterality Date    ANKLE SURGERY Left     CERVICAL BIOPSY  W/ LOOP ELECTRODE EXCISION      CHOLECYSTECTOMY      COLONOSCOPY      DILATATION AND CURETTAGE      ENDOSCOPY      HYSTERECTOMY  2008    Menorrhagia    MOLE REMOVAL      TUBAL ABDOMINAL LIGATION  1993    WRIST SURGERY Left     Ganglion cyst and tumor removal        Outpatient Medications Marked as Taking for the 5/18/23 encounter (Office Visit) with Rolly Keenan APRN   Medication Sig Dispense Refill    albuterol sulfate  (90 Base) MCG/ACT inhaler Inhale 2 puffs Every 6 (Six) Hours As Needed for Wheezing. 18 g 3    allopurinol (ZYLOPRIM) 100 MG tablet Take 1 tablet by mouth Daily.      amLODIPine (NORVASC) 5 MG tablet Take 1 tablet by mouth Daily.      budesonide (RINOCORT AQUA) 32 MCG/ACT nasal spray 2 sprays into the nostril(s) as directed by provider As Needed.      cetirizine (zyrTEC) 10 MG tablet Take 1 tablet by mouth Every Night.      cholecalciferol (VITAMIN D3) 25 MCG (1000 UT) tablet Take 1 tablet by mouth 2 (Two) Times a Day.      citalopram (CeleXA) 20 MG tablet Take 1 tablet by mouth Daily.      colestipol (COLESTID) 1 G tablet Take 1 tablet by mouth Daily.      irbesartan-hydrochlorothiazide (AVALIDE) 150-12.5 MG  "tablet Take 1 tablet by mouth Daily.      levothyroxine (SYNTHROID, LEVOTHROID) 50 MCG tablet TAKE 1 TABLET BY MOUTH ONCE DAILY IN THE MORNING ON AN EMPTY STOMACH      pantoprazole (PROTONIX) 40 MG EC tablet Take 1 tablet by mouth Daily.      rOPINIRole (REQUIP) 0.5 MG tablet Take 1 tablet by mouth Daily.         Allergies   Allergen Reactions    Formaldehyde Rash    Fluoxetine Other (See Comments)     Adverse reaction-suicidal    Latex Other (See Comments)     Gloves turn skin red    Nickel     Other Other (See Comments)     Pt states she is allergic to plastic, reaction -makes skin red     Paxil [Paroxetine Hcl] Mental Status Change    Iodine Rash       Social History     Socioeconomic History    Marital status:    Tobacco Use    Smoking status: Never    Smokeless tobacco: Never    Tobacco comments:     Second hand smoking exposure   Vaping Use    Vaping Use: Never used   Substance and Sexual Activity    Alcohol use: Not Currently     Comment: Occasional    Drug use: Never    Sexual activity: Yes     Partners: Male     Birth control/protection: Surgical, Hysterectomy     Comment: Full hysterectomy       Review of Systems   Constitutional:  Negative for unexpected weight change.   HENT:  Positive for trouble swallowing.    Respiratory:  Negative for shortness of breath.    Cardiovascular:  Negative for chest pain.   Gastrointestinal:  Negative for abdominal pain and anal bleeding.     Objective     Vitals:    05/18/23 0847   BP: 120/68   Pulse: 87   Temp: 97.3 °F (36.3 °C)   SpO2: 98%   Weight: 92.5 kg (204 lb)   Height: 157.5 cm (62\")     Body mass index is 37.31 kg/m².    Physical Exam  Constitutional:       Appearance: Normal appearance. She is well-developed.   Eyes:      General: No scleral icterus.  Cardiovascular:      Heart sounds: Normal heart sounds. No murmur heard.  Pulmonary:      Effort: Pulmonary effort is normal.   Abdominal:      General: Bowel sounds are normal. There is no distension.      " Palpations: Abdomen is soft.      Tenderness: There is no abdominal tenderness. There is no guarding.   Skin:     General: Skin is warm and dry.      Coloration: Skin is not jaundiced.   Neurological:      Mental Status: She is alert.   Psychiatric:         Behavior: Behavior is cooperative.       Imaging Results (Most Recent)       None            Body mass index is 37.31 kg/m².    Assessment & Plan     Diagnoses and all orders for this visit:    1. Abnormal CT of the abdomen (Primary)  -     Case Request; Standing  -     Implement Anesthesia Orders Day of Procedure; Standing  -     Obtain Informed Consent; Standing  -     Case Request    2. History of colon polyps  -     Case Request; Standing  -     Implement Anesthesia Orders Day of Procedure; Standing  -     Obtain Informed Consent; Standing  -     Case Request    3. Gastroesophageal reflux disease, unspecified whether esophagitis present         COLONOSCOPY WITH ANESTHESIA (N/A)    Miralax prep     Recommend daily use of Miralax, adjust as needed  Encouraged addition of dietary fiber, increasing daily water consumption, as well daily physical activity    I have suggested utilizing Miralax starting 7 days prior to colonoscopy to help improve prep.  I have explained stool may be loose but we do not want Salud Daley to be uncomfortable or experiencing fecal incontinence.  Miralax should be adjusted as needed.      Take PPI 30 min prior to breakfast   Decrease caffeine, nicotine, etoh- all contribute to acid reflux  Do not eat 2-3 hours within lying down, elevated HOB 4-6 inches     Advised pt to stop use of NSAIDs, Fish Oil, and MV 5 days prior to procedure, per Dr Lemus protocol.  Tylenol based products are ok to take.  Pt verbalized understanding.    The risks of the endoscopy were discussed in detail.  We discussed the risks of perforation (one out of 5081-0221, riskier with dilation), bleeding (one out of 500), and the rare risks of infection, adverse  reaction to anesthesia, respiratory failure, cardiac failure including MI and adverse reaction to medications, etc.  We discussed consequences that could occur if a risk were to develop such as the need for hospitalization, blood transfusion, surgical intervention, medications, pain and disability and death.  Alternatives include not doing anything, or pursuing an UGI series which only offers a diagnosis with potential less accuracy compared to EGD.  The patient verbalizes understanding and agrees to proceed.      All risks, benefits, alternatives, and indications of colonoscopy procedure have been discussed with the patient. Risks to include perforation of the colon requiring possible surgery or colostomy, risk of bleeding from biopsies or removal of colon tissue, possibility of missing a colon polyp or cancer, or adverse drug reaction.  Benefits to include the diagnosis and management of disease of the colon and rectum. Alternatives to include barium enema, radiographic evaluation, lab testing or no intervention. Pt verbalizes understanding and agrees to proceed with procedure.        Rolly Keenan, APRN  05/18/23          Patient Instructions   Gastroesophageal Reflux Disease, Adult    Gastroesophageal reflux disease (GERD) happens when acid from your stomach flows up into the esophagus. When acid comes in contact with the esophagus, the acid causes soreness (inflammation) in the esophagus. Over time, GERD may create small holes (ulcers) in the lining of the esophagus.  CAUSES    Increased body weight. This puts pressure on the stomach, making acid rise from the stomach into the esophagus.  Smoking. This increases acid production in the stomach.  Drinking alcohol. This causes decreased pressure in the lower esophageal sphincter (valve or ring of muscle between the esophagus and stomach), allowing acid from the stomach into the esophagus.  Late evening meals and a full stomach. This increases pressure and  acid production in the stomach.  A malformed lower esophageal sphincter.  Sometimes, no cause is found.  SYMPTOMS    Burning pain in the lower part of the mid-chest behind the breastbone and in the mid-stomach area. This may occur twice a week or more often.  Trouble swallowing.  Sore throat.  Dry cough.  Asthma-like symptoms including chest tightness, shortness of breath, or wheezing.  DIAGNOSIS    Your caregiver may be able to diagnose GERD based on your symptoms. In some cases, X-rays and other tests may be done to check for complications or to check the condition of your stomach and esophagus.  TREATMENT    Your caregiver may recommend over-the-counter or prescription medicines to help decrease acid production. Ask your caregiver before starting or adding any new medicines.    HOME CARE INSTRUCTIONS    Change the factors that you can control. Ask your caregiver for guidance concerning weight loss, quitting smoking, and alcohol consumption.  Avoid foods and drinks that make your symptoms worse, such as:  Caffeine or alcoholic drinks.  Chocolate.  Peppermint or mint flavorings.  Garlic and onions.  Spicy foods.  Citrus fruits, such as oranges, romi, or limes.  Tomato-based foods such as sauce, chili, salsa, and pizza.  Fried and fatty foods.  Avoid lying down for the 3 hours prior to your bedtime or prior to taking a nap.  Eat small, frequent meals instead of large meals.  Wear loose-fitting clothing. Do not wear anything tight around your waist that causes pressure on your stomach.  Raise the head of your bed 6 to 8 inches with wood blocks to help you sleep. Extra pillows will not help.  Only take over-the-counter or prescription medicines for pain, discomfort, or fever as directed by your caregiver.  Do not take aspirin, ibuprofen, or other nonsteroidal anti-inflammatory drugs (NSAIDs).  SEEK IMMEDIATE MEDICAL CARE IF:    You have pain in your arms, neck, jaw, teeth, or back.  Your pain increases or changes in  intensity or duration.  You develop nausea, vomiting, or sweating (diaphoresis).  You develop shortness of breath, or you faint.  Your vomit is green, yellow, black, or looks like coffee grounds or blood.  Your stool is red, bloody, or black.  These symptoms could be signs of other problems, such as heart disease, gastric bleeding, or esophageal bleeding.  MAKE SURE YOU:    Understand these instructions.  Will watch your condition.  Will get help right away if you are not doing well or get worse.     This information is not intended to replace advice given to you by your health care provider. Make sure you discuss any questions you have with your health care provider.     Document Released: 09/27/2006 Document Revised: 01/08/2016 Document Reviewed: 04/13/2016  [employer] Interactive Patient Education ©2016 [employer]    Gastroesophageal Reflux Disease, Adult    Gastroesophageal reflux disease (GERD) happens when acid from your stomach flows up into the esophagus. When acid comes in contact with the esophagus, the acid causes soreness (inflammation) in the esophagus. Over time, GERD may create small holes (ulcers) in the lining of the esophagus.  CAUSES    Increased body weight. This puts pressure on the stomach, making acid rise from the stomach into the esophagus.  Smoking. This increases acid production in the stomach.  Drinking alcohol. This causes decreased pressure in the lower esophageal sphincter (valve or ring of muscle between the esophagus and stomach), allowing acid from the stomach into the esophagus.  Late evening meals and a full stomach. This increases pressure and acid production in the stomach.  A malformed lower esophageal sphincter.  Sometimes, no cause is found.  SYMPTOMS    Burning pain in the lower part of the mid-chest behind the breastbone and in the mid-stomach area. This may occur twice a week or more often.  Trouble swallowing.  Sore throat.  Dry cough.  Asthma-like symptoms including  chest tightness, shortness of breath, or wheezing.  DIAGNOSIS    Your caregiver may be able to diagnose GERD based on your symptoms. In some cases, X-rays and other tests may be done to check for complications or to check the condition of your stomach and esophagus.  TREATMENT    Your caregiver may recommend over-the-counter or prescription medicines to help decrease acid production. Ask your caregiver before starting or adding any new medicines.    HOME CARE INSTRUCTIONS    Change the factors that you can control. Ask your caregiver for guidance concerning weight loss, quitting smoking, and alcohol consumption.  Avoid foods and drinks that make your symptoms worse, such as:  Caffeine or alcoholic drinks.  Chocolate.  Peppermint or mint flavorings.  Garlic and onions.  Spicy foods.  Citrus fruits, such as oranges, romi, or limes.  Tomato-based foods such as sauce, chili, salsa, and pizza.  Fried and fatty foods.  Avoid lying down for the 3 hours prior to your bedtime or prior to taking a nap.  Eat small, frequent meals instead of large meals.  Wear loose-fitting clothing. Do not wear anything tight around your waist that causes pressure on your stomach.  Raise the head of your bed 6 to 8 inches with wood blocks to help you sleep. Extra pillows will not help.  Only take over-the-counter or prescription medicines for pain, discomfort, or fever as directed by your caregiver.  Do not take aspirin, ibuprofen, or other nonsteroidal anti-inflammatory drugs (NSAIDs).  SEEK IMMEDIATE MEDICAL CARE IF:    You have pain in your arms, neck, jaw, teeth, or back.  Your pain increases or changes in intensity or duration.  You develop nausea, vomiting, or sweating (diaphoresis).  You develop shortness of breath, or you faint.  Your vomit is green, yellow, black, or looks like coffee grounds or blood.  Your stool is red, bloody, or black.  These symptoms could be signs of other problems, such as heart disease, gastric bleeding, or  esophageal bleeding.  MAKE SURE YOU:    Understand these instructions.  Will watch your condition.  Will get help right away if you are not doing well or get worse.     This information is not intended to replace advice given to you by your health care provider. Make sure you discuss any questions you have with your health care provider.     Document Released: 09/27/2006 Document Revised: 01/08/2016 Document Reviewed: 04/13/2016  [employer] Interactive Patient Education ©2016 [employer]

## 2023-05-18 ENCOUNTER — OFFICE VISIT (OUTPATIENT)
Dept: GASTROENTEROLOGY | Facility: CLINIC | Age: 68
End: 2023-05-18
Payer: MEDICARE

## 2023-05-18 VITALS
DIASTOLIC BLOOD PRESSURE: 68 MMHG | TEMPERATURE: 97.3 F | WEIGHT: 204 LBS | HEIGHT: 62 IN | OXYGEN SATURATION: 98 % | HEART RATE: 87 BPM | BODY MASS INDEX: 37.54 KG/M2 | SYSTOLIC BLOOD PRESSURE: 120 MMHG

## 2023-05-18 DIAGNOSIS — Z86.010 HISTORY OF COLON POLYPS: ICD-10-CM

## 2023-05-18 DIAGNOSIS — R93.5 ABNORMAL CT OF THE ABDOMEN: Primary | ICD-10-CM

## 2023-05-18 DIAGNOSIS — K21.9 GASTROESOPHAGEAL REFLUX DISEASE, UNSPECIFIED WHETHER ESOPHAGITIS PRESENT: ICD-10-CM

## 2023-05-18 PROCEDURE — 1159F MED LIST DOCD IN RCRD: CPT | Performed by: NURSE PRACTITIONER

## 2023-05-18 PROCEDURE — 3074F SYST BP LT 130 MM HG: CPT | Performed by: NURSE PRACTITIONER

## 2023-05-18 PROCEDURE — 1160F RVW MEDS BY RX/DR IN RCRD: CPT | Performed by: NURSE PRACTITIONER

## 2023-05-18 PROCEDURE — 3078F DIAST BP <80 MM HG: CPT | Performed by: NURSE PRACTITIONER

## 2023-05-18 PROCEDURE — 99214 OFFICE O/P EST MOD 30 MIN: CPT | Performed by: NURSE PRACTITIONER

## 2023-05-18 RX ORDER — AMLODIPINE BESYLATE 5 MG/1
1 TABLET ORAL DAILY
COMMUNITY
Start: 2023-05-04

## 2023-05-18 NOTE — PATIENT INSTRUCTIONS
Gastroesophageal Reflux Disease, Adult    Gastroesophageal reflux disease (GERD) happens when acid from your stomach flows up into the esophagus. When acid comes in contact with the esophagus, the acid causes soreness (inflammation) in the esophagus. Over time, GERD may create small holes (ulcers) in the lining of the esophagus.  CAUSES    Increased body weight. This puts pressure on the stomach, making acid rise from the stomach into the esophagus.  Smoking. This increases acid production in the stomach.  Drinking alcohol. This causes decreased pressure in the lower esophageal sphincter (valve or ring of muscle between the esophagus and stomach), allowing acid from the stomach into the esophagus.  Late evening meals and a full stomach. This increases pressure and acid production in the stomach.  A malformed lower esophageal sphincter.  Sometimes, no cause is found.  SYMPTOMS    Burning pain in the lower part of the mid-chest behind the breastbone and in the mid-stomach area. This may occur twice a week or more often.  Trouble swallowing.  Sore throat.  Dry cough.  Asthma-like symptoms including chest tightness, shortness of breath, or wheezing.  DIAGNOSIS    Your caregiver may be able to diagnose GERD based on your symptoms. In some cases, X-rays and other tests may be done to check for complications or to check the condition of your stomach and esophagus.  TREATMENT    Your caregiver may recommend over-the-counter or prescription medicines to help decrease acid production. Ask your caregiver before starting or adding any new medicines.    HOME CARE INSTRUCTIONS    Change the factors that you can control. Ask your caregiver for guidance concerning weight loss, quitting smoking, and alcohol consumption.  Avoid foods and drinks that make your symptoms worse, such as:  Caffeine or alcoholic drinks.  Chocolate.  Peppermint or mint flavorings.  Garlic and onions.  Spicy foods.  Citrus fruits, such as oranges, romi, or  limes.  Tomato-based foods such as sauce, chili, salsa, and pizza.  Fried and fatty foods.  Avoid lying down for the 3 hours prior to your bedtime or prior to taking a nap.  Eat small, frequent meals instead of large meals.  Wear loose-fitting clothing. Do not wear anything tight around your waist that causes pressure on your stomach.  Raise the head of your bed 6 to 8 inches with wood blocks to help you sleep. Extra pillows will not help.  Only take over-the-counter or prescription medicines for pain, discomfort, or fever as directed by your caregiver.  Do not take aspirin, ibuprofen, or other nonsteroidal anti-inflammatory drugs (NSAIDs).  SEEK IMMEDIATE MEDICAL CARE IF:    You have pain in your arms, neck, jaw, teeth, or back.  Your pain increases or changes in intensity or duration.  You develop nausea, vomiting, or sweating (diaphoresis).  You develop shortness of breath, or you faint.  Your vomit is green, yellow, black, or looks like coffee grounds or blood.  Your stool is red, bloody, or black.  These symptoms could be signs of other problems, such as heart disease, gastric bleeding, or esophageal bleeding.  MAKE SURE YOU:    Understand these instructions.  Will watch your condition.  Will get help right away if you are not doing well or get worse.     This information is not intended to replace advice given to you by your health care provider. Make sure you discuss any questions you have with your health care provider.     Document Released: 09/27/2006 Document Revised: 01/08/2016 Document Reviewed: 04/13/2016  [employer] Interactive Patient Education ©2016 [employer]    Gastroesophageal Reflux Disease, Adult    Gastroesophageal reflux disease (GERD) happens when acid from your stomach flows up into the esophagus. When acid comes in contact with the esophagus, the acid causes soreness (inflammation) in the esophagus. Over time, GERD may create small holes (ulcers) in the lining of the esophagus.  CAUSES     Increased body weight. This puts pressure on the stomach, making acid rise from the stomach into the esophagus.  Smoking. This increases acid production in the stomach.  Drinking alcohol. This causes decreased pressure in the lower esophageal sphincter (valve or ring of muscle between the esophagus and stomach), allowing acid from the stomach into the esophagus.  Late evening meals and a full stomach. This increases pressure and acid production in the stomach.  A malformed lower esophageal sphincter.  Sometimes, no cause is found.  SYMPTOMS    Burning pain in the lower part of the mid-chest behind the breastbone and in the mid-stomach area. This may occur twice a week or more often.  Trouble swallowing.  Sore throat.  Dry cough.  Asthma-like symptoms including chest tightness, shortness of breath, or wheezing.  DIAGNOSIS    Your caregiver may be able to diagnose GERD based on your symptoms. In some cases, X-rays and other tests may be done to check for complications or to check the condition of your stomach and esophagus.  TREATMENT    Your caregiver may recommend over-the-counter or prescription medicines to help decrease acid production. Ask your caregiver before starting or adding any new medicines.    HOME CARE INSTRUCTIONS    Change the factors that you can control. Ask your caregiver for guidance concerning weight loss, quitting smoking, and alcohol consumption.  Avoid foods and drinks that make your symptoms worse, such as:  Caffeine or alcoholic drinks.  Chocolate.  Peppermint or mint flavorings.  Garlic and onions.  Spicy foods.  Citrus fruits, such as oranges, romi, or limes.  Tomato-based foods such as sauce, chili, salsa, and pizza.  Fried and fatty foods.  Avoid lying down for the 3 hours prior to your bedtime or prior to taking a nap.  Eat small, frequent meals instead of large meals.  Wear loose-fitting clothing. Do not wear anything tight around your waist that causes pressure on your  stomach.  Raise the head of your bed 6 to 8 inches with wood blocks to help you sleep. Extra pillows will not help.  Only take over-the-counter or prescription medicines for pain, discomfort, or fever as directed by your caregiver.  Do not take aspirin, ibuprofen, or other nonsteroidal anti-inflammatory drugs (NSAIDs).  SEEK IMMEDIATE MEDICAL CARE IF:    You have pain in your arms, neck, jaw, teeth, or back.  Your pain increases or changes in intensity or duration.  You develop nausea, vomiting, or sweating (diaphoresis).  You develop shortness of breath, or you faint.  Your vomit is green, yellow, black, or looks like coffee grounds or blood.  Your stool is red, bloody, or black.  These symptoms could be signs of other problems, such as heart disease, gastric bleeding, or esophageal bleeding.  MAKE SURE YOU:    Understand these instructions.  Will watch your condition.  Will get help right away if you are not doing well or get worse.     This information is not intended to replace advice given to you by your health care provider. Make sure you discuss any questions you have with your health care provider.     Document Released: 09/27/2006 Document Revised: 01/08/2016 Document Reviewed: 04/13/2016  [employer] Interactive Patient Education ©2016 [employer]

## 2023-05-30 ENCOUNTER — HOSPITAL ENCOUNTER (OUTPATIENT)
Facility: HOSPITAL | Age: 68
Setting detail: HOSPITAL OUTPATIENT SURGERY
Discharge: HOME | End: 2023-05-30
Attending: INTERNAL MEDICINE | Admitting: INTERNAL MEDICINE

## 2023-05-30 ENCOUNTER — ANESTHESIA (OUTPATIENT)
Dept: GASTROENTEROLOGY | Facility: HOSPITAL | Age: 68
End: 2023-05-30

## 2023-05-30 ENCOUNTER — ANESTHESIA EVENT (OUTPATIENT)
Dept: GASTROENTEROLOGY | Facility: HOSPITAL | Age: 68
End: 2023-05-30

## 2023-05-30 VITALS
TEMPERATURE: 97.8 F | WEIGHT: 198 LBS | DIASTOLIC BLOOD PRESSURE: 70 MMHG | SYSTOLIC BLOOD PRESSURE: 119 MMHG | BODY MASS INDEX: 36.44 KG/M2 | RESPIRATION RATE: 14 BRPM | HEIGHT: 62 IN | HEART RATE: 75 BPM | OXYGEN SATURATION: 100 %

## 2023-05-30 DIAGNOSIS — Z86.010 HISTORY OF COLON POLYPS: ICD-10-CM

## 2023-05-30 DIAGNOSIS — R93.5 ABNORMAL CT OF THE ABDOMEN: ICD-10-CM

## 2023-05-30 PROCEDURE — 88305 TISSUE EXAM BY PATHOLOGIST: CPT | Performed by: INTERNAL MEDICINE

## 2023-05-30 PROCEDURE — 25010000002 PROPOFOL 10 MG/ML EMULSION: Performed by: NURSE ANESTHETIST, CERTIFIED REGISTERED

## 2023-05-30 PROCEDURE — 87081 CULTURE SCREEN ONLY: CPT | Performed by: INTERNAL MEDICINE

## 2023-05-30 RX ORDER — PROPOFOL 10 MG/ML
VIAL (ML) INTRAVENOUS AS NEEDED
Status: DISCONTINUED | OUTPATIENT
Start: 2023-05-30 | End: 2023-05-30 | Stop reason: SURG

## 2023-05-30 RX ORDER — LIDOCAINE HYDROCHLORIDE 20 MG/ML
INJECTION, SOLUTION EPIDURAL; INFILTRATION; INTRACAUDAL; PERINEURAL AS NEEDED
Status: DISCONTINUED | OUTPATIENT
Start: 2023-05-30 | End: 2023-05-30 | Stop reason: SURG

## 2023-05-30 RX ORDER — SODIUM CHLORIDE 9 MG/ML
500 INJECTION, SOLUTION INTRAVENOUS CONTINUOUS PRN
Status: DISCONTINUED | OUTPATIENT
Start: 2023-05-30 | End: 2023-05-30 | Stop reason: HOSPADM

## 2023-05-30 RX ORDER — LIDOCAINE HYDROCHLORIDE 10 MG/ML
0.5 INJECTION, SOLUTION EPIDURAL; INFILTRATION; INTRACAUDAL; PERINEURAL ONCE AS NEEDED
Status: CANCELLED | OUTPATIENT
Start: 2023-05-30

## 2023-05-30 RX ORDER — SODIUM CHLORIDE 0.9 % (FLUSH) 0.9 %
10 SYRINGE (ML) INJECTION AS NEEDED
Status: DISCONTINUED | OUTPATIENT
Start: 2023-05-30 | End: 2023-05-30 | Stop reason: HOSPADM

## 2023-05-30 RX ADMIN — PROPOFOL INJECTABLE EMULSION 50 MG: 10 INJECTION, EMULSION INTRAVENOUS at 08:32

## 2023-05-30 RX ADMIN — PROPOFOL INJECTABLE EMULSION 50 MG: 10 INJECTION, EMULSION INTRAVENOUS at 08:31

## 2023-05-30 RX ADMIN — LIDOCAINE HYDROCHLORIDE 100 MG: 20 INJECTION, SOLUTION EPIDURAL; INFILTRATION; INTRACAUDAL; PERINEURAL at 08:31

## 2023-05-30 RX ADMIN — PROPOFOL INJECTABLE EMULSION 50 MG: 10 INJECTION, EMULSION INTRAVENOUS at 08:33

## 2023-05-30 RX ADMIN — SODIUM CHLORIDE: 0.9 INJECTION, SOLUTION INTRAVENOUS at 08:29

## 2023-05-30 RX ADMIN — PROPOFOL INJECTABLE EMULSION 50 MG: 10 INJECTION, EMULSION INTRAVENOUS at 08:40

## 2023-05-30 RX ADMIN — PROPOFOL INJECTABLE EMULSION 50 MG: 10 INJECTION, EMULSION INTRAVENOUS at 08:44

## 2023-05-30 RX ADMIN — PROPOFOL INJECTABLE EMULSION 50 MG: 10 INJECTION, EMULSION INTRAVENOUS at 08:36

## 2023-05-30 NOTE — ANESTHESIA PREPROCEDURE EVALUATION
Anesthesia Evaluation     Patient summary reviewed   history of anesthetic complications:  PONV  NPO Solid Status: > 8 hours             Airway   Mallampati: I  TM distance: >3 FB  No difficulty expected  Dental      Pulmonary    (+) asthma,sleep apnea  (-) not a smoker  Cardiovascular   Exercise tolerance: (Limited by shortness of breath)    (+) hypertension  (-) past MI, CAD, dysrhythmias, cardiac stents, hyperlipidemia      Neuro/Psych  (-) seizures, TIA, CVA  GI/Hepatic/Renal/Endo    (+) obesity, GERD, thyroid problem   (-) liver disease, no renal disease, diabetes    Musculoskeletal     Abdominal    Substance History      OB/GYN          Other                        Anesthesia Plan    ASA 2     MAC       Anesthetic plan, risks, benefits, and alternatives have been provided, discussed and informed consent has been obtained with: patient.    CODE STATUS:

## 2023-05-30 NOTE — ANESTHESIA POSTPROCEDURE EVALUATION
Patient: Salud Daley    Procedure Summary       Date: 05/30/23 Room / Location: [facility] ENDOSCOPY 4 / [facility] ENDOSCOPY    Anesthesia Start: 0828 Anesthesia Stop: 0849    Procedures:       COLONOSCOPY WITH ANESTHESIA      ESOPHAGOGASTRODUODENOSCOPY WITH ANESTHESIA Diagnosis:       Abnormal CT of the abdomen      History of colon polyps      (Abnormal CT of the abdomen [R93.5])      (History of colon polyps [Z86.010])    Surgeons: Lefty Lemus DO Provider: Leti Corral CRNA    Anesthesia Type: MAC ASA Status: 2            Anesthesia Type: MAC    Vitals  Vitals Value Taken Time   BP 99/57 05/30/23 0851   Temp     Pulse 80 05/30/23 0853   Resp 20 05/30/23 0848   SpO2 97 % 05/30/23 0853   Vitals shown include unvalidated device data.        Post Anesthesia Care and Evaluation    Patient location during evaluation: PHASE II  Patient participation: complete - patient participated  Level of consciousness: awake and alert  Pain score: 0  Pain management: adequate    Airway patency: patent  Anesthetic complications: No anesthetic complications  PONV Status: none  Cardiovascular status: acceptable  Respiratory status: acceptable  Hydration status: acceptable  No anesthesia care post op

## 2023-05-31 LAB
CYTO UR: NORMAL
LAB AP CASE REPORT: NORMAL
Lab: NORMAL
PATH REPORT.FINAL DX SPEC: NORMAL
PATH REPORT.GROSS SPEC: NORMAL
UREASE TISS QL: NEGATIVE

## 2023-06-06 ENCOUNTER — TELEPHONE (OUTPATIENT)
Dept: GASTROENTEROLOGY | Facility: CLINIC | Age: 68
End: 2023-06-06
Payer: MEDICARE

## 2023-06-14 NOTE — PROGRESS NOTES
VIN Ross  [facility]   Pulmonary and Critical Care  [address] [place] [address]  Phone: [phone]  Fax: [phone]           Chief Complaint  Asthma    Subjective    History of Present Illness     Salud Daley presents to [facility] PULMONARY & CRITICAL CARE MEDICINE   History of Present Illness  Ms. Daley is a pleasant 67-year-old female with known mild intermittent asthma and cough.  She also has known obstructive sleep apnea and she is compliant with use. She has a couple of nights where she has a bad night and then might go a few nights without wearing it. She follows with Dr. Roman for this. She has not needed the  Advair. No night time awakenings.  Last use of rescue inhaler was every Sunday with singing. She follows with Dr. Valdez for her allergies. She takes generic zyrtec for allergies and finds benefit.  She denies fever, chills, night sweats.        Objective   Vital Signs:   /70   Pulse 79   Wt 89.8 kg (198 lb)   SpO2 95%   BMI 36.21 kg/m²     Physical Exam  Vitals reviewed.   Constitutional:       Appearance: Normal appearance.   Cardiovascular:      Rate and Rhythm: Normal rate and regular rhythm.   Pulmonary:      Effort: Pulmonary effort is normal.      Breath sounds: Normal breath sounds.   Neurological:      General: No focal deficit present.      Mental Status: She is alert and oriented to person, place, and time.   Psychiatric:         Mood and Affect: Mood normal.         Behavior: Behavior normal.        Result Review :  The following data was reviewed by: VIN Ross on 06/16/2023:    My interpretation of imaging:  no new   My interpretation of labs: No new     PFT Values          11/17/2021    09:30   Pre Drug PFT Results   FVC 77.81   FEV1 73.65   FEF 25-75% 58   FEV1/FVC 76     My interpretation of the PFT : No new     Results for orders placed in visit on 11/17/21    Pulmonary Function  Test    Narrative  Pulmonary Function Test  Performed by: Alyx Sanford APRN  Authorized by: Alyx Sanford APRN    Pre Drug % Predicted  FVC: 77.81%  FEV1: 73.65%  FEF 25-75%: 58%  FEV1/FVC: 76%    Interpretation  Spirometry  Spirometry shows moderate restriction. There is reduced midflow suggesting small airway/airflow obstruction.  Review of FVL curve  Patient's effort is normal.      Results for orders placed in visit on 20    Pulmonary Function Test    Narrative  Pulmonary Function Test  Performed by: Alyx Sanford APRN  Authorized by: Alyx Sanford APRN    Pre Drug  FVC: 74%  FEV1: 68%  FEF 25-75%: 49%  FEV1/FVC: 74.04%  T%  RV: 112%  DLCO: 114%  D/VAsb: 143%            Assessment and Plan   Diagnoses and all orders for this visit:    1. Mild intermittent asthma without complication (Primary)  -     Pulmonary Function Test; Future    2. MANUEL on CPAP      She is doing well. She will continue her current rescue inhaler and return to Advair if she finds she has to use the rescue inhaler more frequently. Return in one year with FVL and DLCO.     Follow Up   Return in about 1 year (around 2024) for FVL w DIF.  Patient was given instructions and counseling regarding her condition or for health maintenance advice. Please see specific information pulled into the AVS if appropriate.     VIN Ross  2023  13:30 CDT

## 2023-06-16 ENCOUNTER — OFFICE VISIT (OUTPATIENT)
Dept: PULMONOLOGY | Facility: CLINIC | Age: 68
End: 2023-06-16
Payer: MEDICARE

## 2023-06-16 VITALS
BODY MASS INDEX: 36.21 KG/M2 | SYSTOLIC BLOOD PRESSURE: 122 MMHG | HEART RATE: 79 BPM | OXYGEN SATURATION: 95 % | WEIGHT: 198 LBS | DIASTOLIC BLOOD PRESSURE: 70 MMHG

## 2023-06-16 DIAGNOSIS — J45.20 MILD INTERMITTENT ASTHMA WITHOUT COMPLICATION: Primary | ICD-10-CM

## 2023-06-16 DIAGNOSIS — Z99.89 OSA ON CPAP: ICD-10-CM

## 2023-06-16 DIAGNOSIS — G47.33 OSA ON CPAP: ICD-10-CM

## 2023-06-16 PROCEDURE — 3074F SYST BP LT 130 MM HG: CPT | Performed by: NURSE PRACTITIONER

## 2023-06-16 PROCEDURE — 99213 OFFICE O/P EST LOW 20 MIN: CPT | Performed by: NURSE PRACTITIONER

## 2023-06-16 PROCEDURE — 1160F RVW MEDS BY RX/DR IN RCRD: CPT | Performed by: NURSE PRACTITIONER

## 2023-06-16 PROCEDURE — 3078F DIAST BP <80 MM HG: CPT | Performed by: NURSE PRACTITIONER

## 2023-06-16 PROCEDURE — 1159F MED LIST DOCD IN RCRD: CPT | Performed by: NURSE PRACTITIONER

## 2023-06-26 ENCOUNTER — HOSPITAL ENCOUNTER (OUTPATIENT)
Dept: WOMENS IMAGING | Age: 68
Discharge: HOME | End: 2023-06-26
Payer: MEDICARE

## 2023-06-26 DIAGNOSIS — N63.10 MASS OF RIGHT BREAST, UNSPECIFIED QUADRANT: ICD-10-CM

## 2023-06-26 PROBLEM — N63.11 MASS OF UPPER OUTER QUADRANT OF RIGHT BREAST: Status: ACTIVE | Noted: 2023-06-26

## 2023-06-26 PROCEDURE — 77065 DX MAMMO INCL CAD UNI: CPT

## 2023-06-26 PROCEDURE — 19083 BX BREAST 1ST LESION US IMAG: CPT

## 2023-06-26 PROCEDURE — 88305 TISSUE EXAM BY PATHOLOGIST: CPT

## 2023-07-06 ENCOUNTER — TELEPHONE (OUTPATIENT)
Dept: SURGERY | Age: 68
End: 2023-07-06

## 2023-07-06 NOTE — TELEPHONE ENCOUNTER
Nadia Gibson called in to see if she still needs to keep upcoming follow up appointment patient thought if results were benign from biopsy she could cancel  Please return call  Thank you

## 2023-07-07 DIAGNOSIS — R92.8 ABNORMAL MAMMOGRAM OF RIGHT BREAST: Primary | ICD-10-CM

## 2023-07-07 NOTE — PROGRESS NOTES
6 month mammography ordered with follow up exam.    This has been electronically signed by Tyler Gibbs.  Jeremiah Norton PA-C.

## 2023-07-12 ENCOUNTER — OFFICE VISIT (OUTPATIENT)
Dept: SURGERY | Age: 68
End: 2023-07-12
Payer: MEDICARE

## 2023-07-12 VITALS
HEIGHT: 62 IN | OXYGEN SATURATION: 100 % | BODY MASS INDEX: 36.25 KG/M2 | HEART RATE: 69 BPM | WEIGHT: 197 LBS | TEMPERATURE: 96.9 F

## 2023-07-12 DIAGNOSIS — N63.10 MASS OF RIGHT BREAST, UNSPECIFIED QUADRANT: Primary | ICD-10-CM

## 2023-07-12 PROCEDURE — 1090F PRES/ABSN URINE INCON ASSESS: CPT | Performed by: PHYSICIAN ASSISTANT

## 2023-07-12 PROCEDURE — 1036F TOBACCO NON-USER: CPT | Performed by: PHYSICIAN ASSISTANT

## 2023-07-12 PROCEDURE — G8400 PT W/DXA NO RESULTS DOC: HCPCS | Performed by: PHYSICIAN ASSISTANT

## 2023-07-12 PROCEDURE — 1123F ACP DISCUSS/DSCN MKR DOCD: CPT | Performed by: PHYSICIAN ASSISTANT

## 2023-07-12 PROCEDURE — 3017F COLORECTAL CA SCREEN DOC REV: CPT | Performed by: PHYSICIAN ASSISTANT

## 2023-07-12 PROCEDURE — G8417 CALC BMI ABV UP PARAM F/U: HCPCS | Performed by: PHYSICIAN ASSISTANT

## 2023-07-12 PROCEDURE — 99212 OFFICE O/P EST SF 10 MIN: CPT | Performed by: PHYSICIAN ASSISTANT

## 2023-07-12 PROCEDURE — G8427 DOCREV CUR MEDS BY ELIG CLIN: HCPCS | Performed by: PHYSICIAN ASSISTANT

## 2023-08-23 ENCOUNTER — OFFICE VISIT (OUTPATIENT)
Dept: SURGERY | Age: 68
End: 2023-08-23
Payer: MEDICARE

## 2023-08-23 VITALS
OXYGEN SATURATION: 100 % | TEMPERATURE: 96.8 F | HEART RATE: 84 BPM | BODY MASS INDEX: 36.25 KG/M2 | WEIGHT: 197 LBS | HEIGHT: 62 IN

## 2023-08-23 DIAGNOSIS — N63.10 MASS OF RIGHT BREAST, UNSPECIFIED QUADRANT: Primary | ICD-10-CM

## 2023-08-23 DIAGNOSIS — N63.11 MASS OF UPPER OUTER QUADRANT OF RIGHT BREAST: ICD-10-CM

## 2023-08-23 PROCEDURE — G8427 DOCREV CUR MEDS BY ELIG CLIN: HCPCS | Performed by: PHYSICIAN ASSISTANT

## 2023-08-23 PROCEDURE — 99213 OFFICE O/P EST LOW 20 MIN: CPT | Performed by: PHYSICIAN ASSISTANT

## 2023-08-23 PROCEDURE — 3017F COLORECTAL CA SCREEN DOC REV: CPT | Performed by: PHYSICIAN ASSISTANT

## 2023-08-23 PROCEDURE — 1123F ACP DISCUSS/DSCN MKR DOCD: CPT | Performed by: PHYSICIAN ASSISTANT

## 2023-08-23 PROCEDURE — G8417 CALC BMI ABV UP PARAM F/U: HCPCS | Performed by: PHYSICIAN ASSISTANT

## 2023-08-23 PROCEDURE — 1036F TOBACCO NON-USER: CPT | Performed by: PHYSICIAN ASSISTANT

## 2023-08-23 PROCEDURE — G8400 PT W/DXA NO RESULTS DOC: HCPCS | Performed by: PHYSICIAN ASSISTANT

## 2023-08-23 PROCEDURE — 1090F PRES/ABSN URINE INCON ASSESS: CPT | Performed by: PHYSICIAN ASSISTANT

## 2023-10-25 ENCOUNTER — OFFICE VISIT (OUTPATIENT)
Dept: PULMONOLOGY | Age: 68
End: 2023-10-25
Payer: MEDICARE

## 2023-10-25 VITALS
SYSTOLIC BLOOD PRESSURE: 125 MMHG | HEIGHT: 62 IN | WEIGHT: 198 LBS | BODY MASS INDEX: 36.44 KG/M2 | OXYGEN SATURATION: 97 % | DIASTOLIC BLOOD PRESSURE: 73 MMHG | TEMPERATURE: 97.9 F | HEART RATE: 84 BPM

## 2023-10-25 DIAGNOSIS — G47.10 HYPERSOMNIA: ICD-10-CM

## 2023-10-25 DIAGNOSIS — E66.9 OBESITY (BMI 30-39.9): ICD-10-CM

## 2023-10-25 DIAGNOSIS — J45.20 MILD INTERMITTENT ASTHMA, UNSPECIFIED WHETHER COMPLICATED: ICD-10-CM

## 2023-10-25 DIAGNOSIS — Z99.89 CPAP (CONTINUOUS POSITIVE AIRWAY PRESSURE) DEPENDENCE: ICD-10-CM

## 2023-10-25 DIAGNOSIS — G47.8 NON-RESTORATIVE SLEEP: ICD-10-CM

## 2023-10-25 DIAGNOSIS — G47.33 OSA (OBSTRUCTIVE SLEEP APNEA): Primary | ICD-10-CM

## 2023-10-25 PROCEDURE — 99203 OFFICE O/P NEW LOW 30 MIN: CPT | Performed by: INTERNAL MEDICINE

## 2023-10-25 PROCEDURE — G8484 FLU IMMUNIZE NO ADMIN: HCPCS | Performed by: INTERNAL MEDICINE

## 2023-10-25 PROCEDURE — G8417 CALC BMI ABV UP PARAM F/U: HCPCS | Performed by: INTERNAL MEDICINE

## 2023-10-25 PROCEDURE — 3017F COLORECTAL CA SCREEN DOC REV: CPT | Performed by: INTERNAL MEDICINE

## 2023-10-25 PROCEDURE — 1036F TOBACCO NON-USER: CPT | Performed by: INTERNAL MEDICINE

## 2023-10-25 PROCEDURE — 1123F ACP DISCUSS/DSCN MKR DOCD: CPT | Performed by: INTERNAL MEDICINE

## 2023-10-25 PROCEDURE — 1090F PRES/ABSN URINE INCON ASSESS: CPT | Performed by: INTERNAL MEDICINE

## 2023-10-25 PROCEDURE — G8399 PT W/DXA RESULTS DOCUMENT: HCPCS | Performed by: INTERNAL MEDICINE

## 2023-10-25 PROCEDURE — G8427 DOCREV CUR MEDS BY ELIG CLIN: HCPCS | Performed by: INTERNAL MEDICINE

## 2023-10-25 RX ORDER — MELATONIN
1000 2 TIMES DAILY
COMMUNITY

## 2023-10-25 ASSESSMENT — ENCOUNTER SYMPTOMS
ABDOMINAL DISTENTION: 0
ABDOMINAL PAIN: 0
COUGH: 0
BACK PAIN: 0
APNEA: 1
RHINORRHEA: 0
ANAL BLEEDING: 0
CHEST TIGHTNESS: 0
SHORTNESS OF BREATH: 0
WHEEZING: 1

## 2023-10-25 NOTE — PROGRESS NOTES
Pulmonary and Sleep [address] (:  1955) is a 79 y.o. female,New patient, here for evaluation of the following chief complaint(s):  New Patient (NATALIYA)      Referring physician:  Brittany Ma,  [address] [place] [address]     ASSESSMENT/PLAN:  1. NATALIYA (obstructive sleep apnea)  Comments:  AHI 12.1 on PSG done   2. CPAP (continuous positive airway pressure) dependence  3. Obesity (BMI 30-39.9)  4. Non-restorative sleep  5. Hypersomnia  6. Mild intermittent asthma, unspecified whether complicated        Discussed CPAP compliance. Discussed proper mask fit. It is likely that her elevated AHI is secondary to leak from the mask. We will refer for CPAP mask fit. Continue current management with as needed bronchodilator for asthma. Avoid risky activities such as driving if sleepy. Katy Hamilton MD, [facility], [facility]    Return in about 4 weeks (around 2023). SUBJECTIVE/OBJECTIVE:  Patient is here for evaluation of obstructive sleep apnea. She has been on CPAP for about 10 years now for the treatment of obstructive sleep apnea. I do not have her initial sleep study. She is on an auto CPAP set to titrate between 8 and 16 cm pressure. On that setting her apnea-hypopnea index is about 11 events per hour. She spends about an hour and a half during sleep and a large leak. She does have mask that goes under her nose and covers her mouth. She does have difficulty keeping the mask from leaking. I was asked to see her regarding the above. She also has a history of asthma. She does use rescue inhaler on average maybe once every 2 months. No recent sleep study on file. Prior to Visit Medications    Medication Sig Taking?  Authorizing Provider   vitamin D3 (CHOLECALCIFEROL) 25 MCG (1000 UT) TABS tablet Take 1 tablet by mouth 2 times daily Yes Provider, MD Shayy   NONFORMULARY Compound HRT Yes Provider, MD Shayy   RYBELSUS 3 MG TABS  Yes

## 2023-12-28 ENCOUNTER — HOSPITAL ENCOUNTER (OUTPATIENT)
Dept: WOMENS IMAGING | Age: 68
Discharge: HOME | End: 2023-12-28
Payer: MEDICARE

## 2023-12-28 DIAGNOSIS — N63.11 MASS OF UPPER OUTER QUADRANT OF RIGHT BREAST: ICD-10-CM

## 2023-12-28 PROCEDURE — 76642 ULTRASOUND BREAST LIMITED: CPT

## 2023-12-28 PROCEDURE — 77065 DX MAMMO INCL CAD UNI: CPT

## 2024-01-31 ENCOUNTER — OFFICE VISIT (OUTPATIENT)
Dept: PULMONOLOGY | Age: 69
End: 2024-01-31
Payer: MEDICARE

## 2024-01-31 VITALS
DIASTOLIC BLOOD PRESSURE: 78 MMHG | TEMPERATURE: 97.7 F | HEART RATE: 75 BPM | WEIGHT: 208 LBS | BODY MASS INDEX: 38.28 KG/M2 | SYSTOLIC BLOOD PRESSURE: 123 MMHG | OXYGEN SATURATION: 98 % | HEIGHT: 62 IN

## 2024-01-31 DIAGNOSIS — Z99.89 CPAP (CONTINUOUS POSITIVE AIRWAY PRESSURE) DEPENDENCE: ICD-10-CM

## 2024-01-31 DIAGNOSIS — E66.9 OBESITY (BMI 30-39.9): ICD-10-CM

## 2024-01-31 DIAGNOSIS — G47.8 NON-RESTORATIVE SLEEP: ICD-10-CM

## 2024-01-31 DIAGNOSIS — G47.33 OSA (OBSTRUCTIVE SLEEP APNEA): Primary | ICD-10-CM

## 2024-01-31 PROCEDURE — G8484 FLU IMMUNIZE NO ADMIN: HCPCS | Performed by: INTERNAL MEDICINE

## 2024-01-31 PROCEDURE — 1036F TOBACCO NON-USER: CPT | Performed by: INTERNAL MEDICINE

## 2024-01-31 PROCEDURE — 99213 OFFICE O/P EST LOW 20 MIN: CPT | Performed by: INTERNAL MEDICINE

## 2024-01-31 PROCEDURE — 1090F PRES/ABSN URINE INCON ASSESS: CPT | Performed by: INTERNAL MEDICINE

## 2024-01-31 PROCEDURE — 1123F ACP DISCUSS/DSCN MKR DOCD: CPT | Performed by: INTERNAL MEDICINE

## 2024-01-31 PROCEDURE — 3017F COLORECTAL CA SCREEN DOC REV: CPT | Performed by: INTERNAL MEDICINE

## 2024-01-31 PROCEDURE — G8417 CALC BMI ABV UP PARAM F/U: HCPCS | Performed by: INTERNAL MEDICINE

## 2024-01-31 PROCEDURE — G8399 PT W/DXA RESULTS DOCUMENT: HCPCS | Performed by: INTERNAL MEDICINE

## 2024-01-31 PROCEDURE — G8427 DOCREV CUR MEDS BY ELIG CLIN: HCPCS | Performed by: INTERNAL MEDICINE

## 2024-01-31 ASSESSMENT — ENCOUNTER SYMPTOMS
APNEA: 1
SHORTNESS OF BREATH: 0
BACK PAIN: 0
CHEST TIGHTNESS: 0
ABDOMINAL DISTENTION: 0
ABDOMINAL PAIN: 0
ANAL BLEEDING: 0
WHEEZING: 1
COUGH: 0
RHINORRHEA: 0

## 2024-01-31 NOTE — PROGRESS NOTES
Pulmonary and Sleep Medicine    Heaven Amado (:  1955) is a 68 y.o. female,Established patient, here for evaluation of the following chief complaint(s):  Follow-up (Follow up from sleep study   NATALIYA)      Referring physician:  No referring provider defined for this encounter.     ASSESSMENT/PLAN:  1. NATALIYA (obstructive sleep apnea)  2. CPAP (continuous positive airway pressure) dependence  3. Obesity (BMI 30-39.9)  4. Non-restorative sleep        Continue current management with the CPAP she is compliant with the CPAP she feels the CPAP helps significantly.       Ambrocio Garrison MD, [facility], [facility]    Return in about 6 months (around 2024).    SUBJECTIVE/OBJECTIVE:  She is here for follow up on sleep apnea.  Her CPAP download data was reviewed by myself.  My interpretation of the download data is that she is using the CPAP on average about 6 hours a night.  Her apnea-hypopnea index while on CPAP is 8.7 events per hour which is improved compared to prior download.  She is compliant with the CPAP she feels it helps her symptoms.        Prior to Visit Medications    Medication Sig Taking? Authorizing Provider   folic acid (FOLVITE) 1 MG tablet  Yes Shayy Gómez MD   ferrous sulfate 324 (65 Fe) MG EC tablet  Yes Shayy Gómez MD   albuterol sulfate HFA (PROVENTIL;VENTOLIN;PROAIR) 108 (90 Base) MCG/ACT inhaler Inhale 2 puffs into the lungs 4 times daily Yes Geronimo Zavala MD   pantoprazole (PROTONIX) 40 MG tablet Take 1 tablet by mouth daily Yes Geronimo Zavala MD   cetirizine (ZYRTEC) 10 MG tablet Take 1 tablet by mouth nightly Yes Shayy Gómez MD   levothyroxine (SYNTHROID) 50 MCG tablet Take 1 tablet by mouth Daily Yes Shayy Gómez MD   allopurinol (ZYLOPRIM) 100 MG tablet Take 1 tablet by mouth daily Yes Shayy Gómez MD   rOPINIRole (REQUIP) 0.5 MG tablet Take 1 tablet by mouth nightly Yes Shayy Gómez MD   vitamin D3 (CHOLECALCIFEROL) 25 MCG

## 2024-03-04 ENCOUNTER — TELEPHONE (OUTPATIENT)
Dept: SURGERY | Age: 69
End: 2024-03-04

## 2024-03-04 DIAGNOSIS — J45.20 MILD INTERMITTENT ASTHMA WITHOUT COMPLICATION: Primary | ICD-10-CM

## 2024-03-04 RX ORDER — ALBUTEROL SULFATE 90 UG/1
AEROSOL, METERED RESPIRATORY (INHALATION)
Qty: 18 G | Refills: 0 | OUTPATIENT
Start: 2024-03-04

## 2024-03-04 RX ORDER — ALBUTEROL SULFATE 90 UG/1
2 AEROSOL, METERED RESPIRATORY (INHALATION) EVERY 6 HOURS PRN
Qty: 18 G | Refills: 3 | Status: SHIPPED | OUTPATIENT
Start: 2024-03-04

## 2024-03-04 NOTE — TELEPHONE ENCOUNTER
I called pt, no answer, left vm. I also sent her [employer] message. Order faxed. This is taken care of.

## 2024-03-04 NOTE — TELEPHONE ENCOUNTER
Patient states she would like to speak with Zhanna, she states she did not see [employer] message but I did read to her, she states she wasn't aware that she was due and that she already cancelled with [facility], she states they said it was her yearly and she is unsure if medicare will pay for it, please return call to discuss.     Thank you

## 2024-03-04 NOTE — TELEPHONE ENCOUNTER
Heaven called this morning to advise that she has an appt this afternoon at 2:00 p.m. for a mammogram at [facility], but they have not as yet received the order for same.     Thank you. -

## 2024-03-04 NOTE — TELEPHONE ENCOUNTER
I called pt back again just now, no answer, left vm telling her that her last bilateral screening mammogram was at [facility] March 2023, so she's due March 2024 for one. I told her she can message me on [employer] or call back.

## 2024-03-04 NOTE — TELEPHONE ENCOUNTER
Requested Prescriptions     Pending Prescriptions Disp Refills    albuterol sulfate HFA (PROVENTIL;VENTOLIN;PROAIR) 108 (90 Base) MCG/ACT inhaler [Pharmacy Med Name: Albuterol Sulfate  (90 Base) MCG/ACT Inhalation Aerosol Solution] 18 g 0     Sig: INHALE 2 PUFFS BY MOUTH 4 TIMES DAILY       Last Office Visit: Visit date not found  Next Office Visit: Visit date not found  Last Medication Refill: 6/10/2022 with 3 RF

## 2024-03-04 NOTE — TELEPHONE ENCOUNTER
Refill request received by fax.    Rx Refill Note  Requested Prescriptions     Pending Prescriptions Disp Refills    albuterol sulfate  (90 Base) MCG/ACT inhaler 18 g 3     Sig: Inhale 2 puffs Every 6 (Six) Hours As Needed for Wheezing.      Last office visit with prescribing clinician: 6/16/2023   Last telemedicine visit with prescribing clinician: Visit date not found   Next office visit with prescribing clinician: 6/26/2024                         Would you like a call back once the refill request has been completed: [] Yes [] No    If the office needs to give you a call back, can they leave a voicemail: [] Yes [] No    Sharonda Carrera MA  03/04/24, 10:30 CST

## 2024-03-12 DIAGNOSIS — Z12.31 VISIT FOR SCREENING MAMMOGRAM: Primary | ICD-10-CM

## 2024-03-12 NOTE — TELEPHONE ENCOUNTER
This has been taken care of. Pt had mammo @ [facility] on 3/8/24. Per Juan its ok & she's to have bilat mammo march 2025. Pt informed.

## 2024-06-06 RX ORDER — FOLIC ACID 1 MG/1
1 TABLET ORAL DAILY
COMMUNITY
Start: 2024-02-16

## 2024-07-11 ENCOUNTER — OFFICE VISIT (OUTPATIENT)
Dept: PULMONOLOGY | Facility: CLINIC | Age: 69
End: 2024-07-11
Payer: MEDICARE

## 2024-07-11 ENCOUNTER — PROCEDURE VISIT (OUTPATIENT)
Dept: PULMONOLOGY | Facility: CLINIC | Age: 69
End: 2024-07-11
Payer: MEDICARE

## 2024-07-11 VITALS
DIASTOLIC BLOOD PRESSURE: 62 MMHG | HEIGHT: 62 IN | BODY MASS INDEX: 39.45 KG/M2 | WEIGHT: 214.4 LBS | HEART RATE: 89 BPM | OXYGEN SATURATION: 98 % | SYSTOLIC BLOOD PRESSURE: 127 MMHG

## 2024-07-11 DIAGNOSIS — G47.33 OSA ON CPAP: ICD-10-CM

## 2024-07-11 DIAGNOSIS — J45.20 MILD INTERMITTENT ASTHMA WITHOUT COMPLICATION: Primary | ICD-10-CM

## 2024-07-11 NOTE — PROGRESS NOTES
" VIN Ross  [facility]   Pulmonary and Critical Care  [address]  [place] [address]  Phone: [phone]  Fax: [phone]           Chief Complaint  Asthma    Subjective    History of Present Illness     Salud Daley presents to [facility] PULMONARY & CRITICAL CARE MEDICINE   History of Present Illness  Ms. Daley is a pleasant 68-year-old female with known mild intermittent asthma and cough. She also has known obstructive sleep apnea and she is compliant with CPAP use.  She has not needed the  Advair. No night time awakenings. She has not needed the rescue inhaler. She follows with Dr. Valdez for her allergies. She takes generic zyrtec for allergies and finds benefit.  She denies fever, chills. She has night sweats.  FVL and DLCO today show moderate restriction and normal diffusion capacity.               Objective   Vital Signs:   /62   Pulse 89   Ht 157.5 cm (62\")   Wt 97.3 kg (214 lb 6.4 oz)   SpO2 98% Comment: RA  BMI 39.21 kg/m²     Physical Exam  Vitals reviewed.   Constitutional:       Appearance: Normal appearance. She is obese.   Cardiovascular:      Rate and Rhythm: Normal rate and regular rhythm.   Pulmonary:      Effort: Pulmonary effort is normal.      Breath sounds: Normal breath sounds.   Neurological:      General: No focal deficit present.      Mental Status: She is alert and oriented to person, place, and time.   Psychiatric:         Mood and Affect: Mood normal.         Behavior: Behavior normal.          Result Review :  The following data was reviewed by: VIN Ross on 07/11/2024:    My interpretation of imaging:  no new   My interpretation of labs: no new     PFT Values          7/11/2024    15:30   Pre Drug PFT Results   FVC 63   FEV1 64   FEF 25-75% 62   FEV1/FVC 79   Other Tests PFT Results   DLCO 96   D/VAsb 112     My interpretation of the PFT : as in HPI    Results for orders placed in visit on " 24    Spirometry with Diffusion Capacity    Narrative  Spirometry with Diffusion Capacity    Performed by: Tiffany Vu, RRT  Authorized by: Alyx Sanford APRN  Pre Drug % Predicted  FVC: 63%  FEV1: 64%  FEF 25-75%: 62%  FEV1/FVC: 79%  DLCO: 96%  D/VAsb: 112%    Interpretation  Spirometry  Spirometry shows moderate restriction.  Review of FVL curve  Patient's effort is normal.  Diffusion Capacity  The patient's diffusion capacity is normal.  Diffusion capacity is normal when corrected for alveolar volume.  Overall comments: , Trace compared to 2021 and although remains moderately restricted her FEV1 has dropped from 73 to 64% predicted and FVC has dropped from 77 to 63% predicted.  Diffusion capacity is compared to 2020 and remains normal when corrected for alveolar volume with a drop from 143 to 112% predicted.      Results for orders placed in visit on 21    Pulmonary Function Test    Narrative  Pulmonary Function Test  Performed by: Alyx Sanford APRN  Authorized by: Alyx Sanford APRN    Pre Drug % Predicted  FVC: 77.81%  FEV1: 73.65%  FEF 25-75%: 58%  FEV1/FVC: 76%    Interpretation  Spirometry  Spirometry shows moderate restriction. There is reduced midflow suggesting small airway/airflow obstruction.  Review of FVL curve  Patient's effort is normal.      Results for orders placed in visit on 20    Pulmonary Function Test    Narrative  Pulmonary Function Test  Performed by: Alyx Sanford APRN  Authorized by: Alyx Sanford APRN    Pre Drug  FVC: 74%  FEV1: 68%  FEF 25-75%: 49%  FEV1/FVC: 74.04%  T%  RV: 112%  DLCO: 114%  D/VAsb: 143%          Assessment and Plan   Diagnoses and all orders for this visit:    1. Mild intermittent asthma without complication (Primary)  -     Alpha - 1 - Antitrypsin; Future  -     Spirometry with Diffusion Capacity  -     Spirometry with Diffusion Capacity; Future    2. MANUEL on CPAP  -      Alpha - 1 - Antitrypsin; Future        Overall she is stable with her asthma.  She has not needed her rescue inhaler in a long time and she has not needed the Advair in an even longer time.  We discussed katy, review of Dr. Pittman's office notes in regards to her sleep apnea.  She has an upcoming appointment with him on August 1.  She will discuss possible updating of home sleep study and referral to Dr. Babatunde Quinonez, ENT for consideration for katy.  I have otherwise asked her to return in 1 year with a flow-volume loop and diffusion capacity.    Alpha 1:  tested today   LDCT: never smoker   Smoking Cessation: never smoker         Follow Up   Return in about 1 year (around 7/11/2025) for FVL w DIF.  Patient was given instructions and counseling regarding her condition or for health maintenance advice. Please see specific information pulled into the AVS if appropriate.     Alyx Sanford, APRN  7/11/2024  16:20 CDT

## 2024-07-11 NOTE — PROCEDURES
Spirometry with Diffusion Capacity    Performed by: Tiffany Vu, RRT  Authorized by: Alyx Sanford APRN     Pre Drug % Predicted    FVC: 63%   FEV1: 64%   FEF 25-75%: 62%   FEV1/FVC: 79%   DLCO: 96%   D/VAsb: 112%    Interpretation   Spirometry   Spirometry shows moderate restriction.   Review of FVL curve   Patient's effort is normal.   Diffusion Capacity  The patient's diffusion capacity is normal.  Diffusion capacity is normal when corrected for alveolar volume.   Overall comments: , Trace compared to November 2021 and although remains moderately restricted her FEV1 has dropped from 73 to 64% predicted and FVC has dropped from 77 to 63% predicted.  Diffusion capacity is compared to February 2020 and remains normal when corrected for alveolar volume with a drop from 143 to 112% predicted.

## 2024-07-22 DIAGNOSIS — J45.20 MILD INTERMITTENT ASTHMA WITHOUT COMPLICATION: ICD-10-CM

## 2024-07-22 DIAGNOSIS — G47.33 OSA ON CPAP: ICD-10-CM

## 2024-07-23 ENCOUNTER — TELEPHONE (OUTPATIENT)
Dept: PULMONOLOGY | Facility: CLINIC | Age: 69
End: 2024-07-23
Payer: MEDICARE

## 2024-07-23 NOTE — TELEPHONE ENCOUNTER
----- Message from Alyx Sanford sent at 7/22/2024  5:09 PM CDT -----  Please let patient know that the alpha-1 test results were normal.

## 2024-08-01 ENCOUNTER — OFFICE VISIT (OUTPATIENT)
Dept: PULMONOLOGY | Age: 69
End: 2024-08-01
Payer: MEDICARE

## 2024-08-01 VITALS
TEMPERATURE: 97.8 F | BODY MASS INDEX: 39.01 KG/M2 | OXYGEN SATURATION: 97 % | DIASTOLIC BLOOD PRESSURE: 73 MMHG | HEART RATE: 87 BPM | WEIGHT: 212 LBS | HEIGHT: 62 IN | SYSTOLIC BLOOD PRESSURE: 118 MMHG

## 2024-08-01 DIAGNOSIS — E66.9 OBESITY (BMI 30-39.9): ICD-10-CM

## 2024-08-01 DIAGNOSIS — G47.10 HYPERSOMNIA: ICD-10-CM

## 2024-08-01 DIAGNOSIS — G47.8 NON-RESTORATIVE SLEEP: ICD-10-CM

## 2024-08-01 DIAGNOSIS — G47.33 OSA (OBSTRUCTIVE SLEEP APNEA): Primary | ICD-10-CM

## 2024-08-01 DIAGNOSIS — Z99.89 CPAP (CONTINUOUS POSITIVE AIRWAY PRESSURE) DEPENDENCE: ICD-10-CM

## 2024-08-01 PROCEDURE — G8417 CALC BMI ABV UP PARAM F/U: HCPCS | Performed by: INTERNAL MEDICINE

## 2024-08-01 PROCEDURE — G8427 DOCREV CUR MEDS BY ELIG CLIN: HCPCS | Performed by: INTERNAL MEDICINE

## 2024-08-01 PROCEDURE — 3017F COLORECTAL CA SCREEN DOC REV: CPT | Performed by: INTERNAL MEDICINE

## 2024-08-01 PROCEDURE — 1090F PRES/ABSN URINE INCON ASSESS: CPT | Performed by: INTERNAL MEDICINE

## 2024-08-01 PROCEDURE — 1123F ACP DISCUSS/DSCN MKR DOCD: CPT | Performed by: INTERNAL MEDICINE

## 2024-08-01 PROCEDURE — 99213 OFFICE O/P EST LOW 20 MIN: CPT | Performed by: INTERNAL MEDICINE

## 2024-08-01 PROCEDURE — G8399 PT W/DXA RESULTS DOCUMENT: HCPCS | Performed by: INTERNAL MEDICINE

## 2024-08-01 PROCEDURE — 1036F TOBACCO NON-USER: CPT | Performed by: INTERNAL MEDICINE

## 2024-08-01 RX ORDER — CITALOPRAM 20 MG/1
TABLET ORAL
COMMUNITY

## 2024-08-01 ASSESSMENT — ENCOUNTER SYMPTOMS
ABDOMINAL PAIN: 0
ANAL BLEEDING: 0
WHEEZING: 1
COUGH: 0
APNEA: 1
BACK PAIN: 0
ABDOMINAL DISTENTION: 0
SHORTNESS OF BREATH: 0
RHINORRHEA: 0
CHEST TIGHTNESS: 0

## 2024-08-01 NOTE — PROGRESS NOTES
Pulmonary and Sleep Medicine    Heaven Amado (:  1955) is a 68 y.o. female,Established patient, here for evaluation of the following chief complaint(s):  Follow-up (6 mo f/u  NATALIYA)      Referring physician:  No referring provider defined for this encounter.     ASSESSMENT/PLAN:  1. NATALIYA (obstructive sleep apnea)  2. CPAP (continuous positive airway pressure) dependence  3. Obesity (BMI 30-39.9)  4. Non-restorative sleep  5. Hypersomnia        Discussed changing the humidification setting to affect adequate modification in order to improve comfort.  Continue CPAP she is compliant with the CPAP she feels the CPAP helps.       Ambrocio Garrison MD, [facility], [facility]    No follow-ups on file.    SUBJECTIVE/OBJECTIVE:        Patient is here for follow-up on obstructive sleep apnea.  My interpretation her CPAP download is that she is using the CPAP on average about 6 hours a night.  Apnea-hypopnea index while on the CPAP is less than 5 events per hour.  She feels that the CPAP is helping however she wakes up.  Congested sometimes.  Her machine does not seem to be using the water and humidifier.          Continue the following medications as reported by the patient:    Prior to Visit Medications    Medication Sig Taking? Authorizing Provider   citalopram (CELEXA) 20 MG tablet  Yes Shayy Gómez MD   folic acid (FOLVITE) 1 MG tablet  Yes Shayy Gómez MD   ferrous sulfate 324 (65 Fe) MG EC tablet  Yes Shayy Gómez MD   albuterol sulfate HFA (PROVENTIL;VENTOLIN;PROAIR) 108 (90 Base) MCG/ACT inhaler Inhale 2 puffs into the lungs 4 times daily Yes Geronimo Zavala MD   pantoprazole (PROTONIX) 40 MG tablet Take 1 tablet by mouth daily Yes Geronimo Zavala MD   cetirizine (ZYRTEC) 10 MG tablet Take 1 tablet by mouth nightly Yes Shayy Gómez MD   levothyroxine (SYNTHROID) 50 MCG tablet Take 1 tablet by mouth Daily Yes Shayy Gómez MD   allopurinol (ZYLOPRIM) 100 MG tablet Take 1

## 2024-08-12 NOTE — TELEPHONE ENCOUNTER
Left message on voicemail stating her results were sent to her through [employer].    [No Acute Distress] : no acute distress [Good Air Entry] : good air entry [Obese] : obese [Normal Venous Pressure] : normal venous pressure [No Carotid Bruit] : no carotid bruit [Normal S1, S2] : normal S1, S2 [No Murmur] : no murmur [No Gallop] : no gallop [No Edema] : no edema [Moves all extremities] : moves all extremities [Alert and Oriented] : alert and oriented

## 2025-01-03 ENCOUNTER — HOSPITAL ENCOUNTER (OUTPATIENT)
Dept: WOMENS IMAGING | Age: 70
Discharge: HOME | End: 2025-01-03
Payer: MEDICARE

## 2025-01-03 DIAGNOSIS — Z12.31 VISIT FOR SCREENING MAMMOGRAM: ICD-10-CM

## 2025-01-03 PROCEDURE — 77063 BREAST TOMOSYNTHESIS BI: CPT

## 2025-01-30 ENCOUNTER — TELEPHONE (OUTPATIENT)
Dept: SURGERY | Age: 70
End: 2025-01-30

## 2025-01-30 NOTE — TELEPHONE ENCOUNTER
Heaven received a message in [employer] regarding an appointment, patient has an appointment scheduled for March 13th to discuss mammogram results.    Thank you.

## 2025-02-03 ENCOUNTER — OFFICE VISIT (OUTPATIENT)
Dept: PULMONOLOGY | Age: 70
End: 2025-02-03
Payer: MEDICARE

## 2025-02-03 VITALS
BODY MASS INDEX: 37.91 KG/M2 | SYSTOLIC BLOOD PRESSURE: 117 MMHG | TEMPERATURE: 97.8 F | HEIGHT: 62 IN | OXYGEN SATURATION: 94 % | DIASTOLIC BLOOD PRESSURE: 75 MMHG | HEART RATE: 79 BPM | WEIGHT: 206 LBS

## 2025-02-03 DIAGNOSIS — J45.20 MILD INTERMITTENT ASTHMA, UNSPECIFIED WHETHER COMPLICATED: ICD-10-CM

## 2025-02-03 DIAGNOSIS — G47.10 HYPERSOMNIA: ICD-10-CM

## 2025-02-03 DIAGNOSIS — G47.8 NON-RESTORATIVE SLEEP: ICD-10-CM

## 2025-02-03 DIAGNOSIS — Z99.89 CPAP (CONTINUOUS POSITIVE AIRWAY PRESSURE) DEPENDENCE: ICD-10-CM

## 2025-02-03 DIAGNOSIS — E66.9 OBESITY (BMI 30-39.9): ICD-10-CM

## 2025-02-03 DIAGNOSIS — K21.9 GASTROESOPHAGEAL REFLUX DISEASE WITHOUT ESOPHAGITIS: ICD-10-CM

## 2025-02-03 DIAGNOSIS — G47.33 OSA (OBSTRUCTIVE SLEEP APNEA): Primary | ICD-10-CM

## 2025-02-03 PROCEDURE — G8399 PT W/DXA RESULTS DOCUMENT: HCPCS | Performed by: NURSE PRACTITIONER

## 2025-02-03 PROCEDURE — 1123F ACP DISCUSS/DSCN MKR DOCD: CPT | Performed by: NURSE PRACTITIONER

## 2025-02-03 PROCEDURE — G8427 DOCREV CUR MEDS BY ELIG CLIN: HCPCS | Performed by: NURSE PRACTITIONER

## 2025-02-03 PROCEDURE — G8417 CALC BMI ABV UP PARAM F/U: HCPCS | Performed by: NURSE PRACTITIONER

## 2025-02-03 PROCEDURE — 99214 OFFICE O/P EST MOD 30 MIN: CPT | Performed by: NURSE PRACTITIONER

## 2025-02-03 PROCEDURE — 1090F PRES/ABSN URINE INCON ASSESS: CPT | Performed by: NURSE PRACTITIONER

## 2025-02-03 PROCEDURE — 3017F COLORECTAL CA SCREEN DOC REV: CPT | Performed by: NURSE PRACTITIONER

## 2025-02-03 PROCEDURE — 1159F MED LIST DOCD IN RCRD: CPT | Performed by: NURSE PRACTITIONER

## 2025-02-03 PROCEDURE — 1036F TOBACCO NON-USER: CPT | Performed by: NURSE PRACTITIONER

## 2025-02-03 RX ORDER — CHLORHEXIDINE GLUCONATE 4 %
LIQUID (ML) TOPICAL
COMMUNITY
Start: 2023-01-01

## 2025-02-03 RX ORDER — ALBUTEROL SULFATE 90 UG/1
2 INHALANT RESPIRATORY (INHALATION) 4 TIMES DAILY
Qty: 18 G | Refills: 3 | Status: SHIPPED | OUTPATIENT
Start: 2025-02-03

## 2025-02-03 ASSESSMENT — ENCOUNTER SYMPTOMS
GASTROINTESTINAL NEGATIVE: 1
ALLERGIC/IMMUNOLOGIC NEGATIVE: 1
CHEST TIGHTNESS: 0
WHEEZING: 1
SHORTNESS OF BREATH: 0
APNEA: 1
EYES NEGATIVE: 1
COUGH: 1

## 2025-02-03 NOTE — PROGRESS NOTES
Pulmonary and Sleep Medicine    Heaven Amado (:  1955) is a 69 y.o. female,Established patient, here for evaluation of the following chief complaint(s): NATALIYA, asthma   6 Month Follow-Up      Referring physician:  No referring provider defined for this encounter.     ASSESSMENT/PLAN:  1. NATALIYA (obstructive sleep apnea)  2. CPAP (continuous positive airway pressure) dependence  3. Obesity (BMI 30-39.9)  4. Non-restorative sleep  5. Hypersomnia  6. Mild intermittent asthma, unspecified whether complicated  -     albuterol sulfate HFA (PROVENTIL;VENTOLIN;PROAIR) 108 (90 Base) MCG/ACT inhaler; Inhale 2 puffs into the lungs 4 times daily, Disp-18 g, R-3Normal  7. Gastroesophageal reflux disease without esophagitis      Use albuterol as needed for wheezing. Discussed that if MARY use becomes daily, may need to restart ICS/LABA. She sees Pulmonology at [facility]. Continue CPAP with adjusted titration to 11-20. She is compliant and feels the CPAP helps.      Return in about 3 months (around 5/3/2025).    SUBJECTIVE/OBJECTIVE:    HPI    Patient is here for follow-up for obstructive sleep apnea. My interpretation of the CPAP download is that she is using the CPAP about 6 hours per night. Apnea-hypopnea index while on the CPAP is 5.4 events per hour. She does mention sleep has been affected by back pain though this is improving with treatment by the chiropractor. Her  feels her snoring has been more frequent the last few months. She also mentions occasional dry cough though this has improved with increased humidification with CPAP. She notices herself wheezing at times but has not used her inhaler. She denies shortness of breath.     She continues Pantoprazole for GERD and feels this controls her symptoms well.     Continue the following medications as reported by the patient:    Prior to Visit Medications    Medication Sig Taking? Authorizing Provider   citalopram (CELEXA) 20 MG tablet   Provider, Historical,

## 2025-02-11 ENCOUNTER — OFFICE VISIT (OUTPATIENT)
Dept: SURGERY | Age: 70
End: 2025-02-11
Payer: MEDICARE

## 2025-02-11 VITALS — HEART RATE: 83 BPM | WEIGHT: 207 LBS | HEIGHT: 62 IN | BODY MASS INDEX: 38.09 KG/M2 | OXYGEN SATURATION: 98 %

## 2025-02-11 DIAGNOSIS — Z12.31 VISIT FOR SCREENING MAMMOGRAM: Primary | ICD-10-CM

## 2025-02-11 PROCEDURE — 1123F ACP DISCUSS/DSCN MKR DOCD: CPT | Performed by: PHYSICIAN ASSISTANT

## 2025-02-11 PROCEDURE — 99213 OFFICE O/P EST LOW 20 MIN: CPT | Performed by: PHYSICIAN ASSISTANT

## 2025-02-11 PROCEDURE — 1036F TOBACCO NON-USER: CPT | Performed by: PHYSICIAN ASSISTANT

## 2025-02-11 PROCEDURE — G8399 PT W/DXA RESULTS DOCUMENT: HCPCS | Performed by: PHYSICIAN ASSISTANT

## 2025-02-11 PROCEDURE — 3017F COLORECTAL CA SCREEN DOC REV: CPT | Performed by: PHYSICIAN ASSISTANT

## 2025-02-11 PROCEDURE — G8427 DOCREV CUR MEDS BY ELIG CLIN: HCPCS | Performed by: PHYSICIAN ASSISTANT

## 2025-02-11 PROCEDURE — 1159F MED LIST DOCD IN RCRD: CPT | Performed by: PHYSICIAN ASSISTANT

## 2025-02-11 PROCEDURE — 1090F PRES/ABSN URINE INCON ASSESS: CPT | Performed by: PHYSICIAN ASSISTANT

## 2025-02-11 PROCEDURE — G8417 CALC BMI ABV UP PARAM F/U: HCPCS | Performed by: PHYSICIAN ASSISTANT

## 2025-02-11 RX ORDER — COLESTIPOL HYDROCHLORIDE 1 G/1
TABLET ORAL
COMMUNITY
Start: 2023-09-05

## 2025-02-11 RX ORDER — ATORVASTATIN CALCIUM 10 MG/1
TABLET, FILM COATED ORAL
COMMUNITY

## 2025-02-11 RX ORDER — IRBESARTAN AND HYDROCHLOROTHIAZIDE 150; 12.5 MG/1; MG/1
1 TABLET, FILM COATED ORAL DAILY
COMMUNITY
Start: 2025-01-06

## 2025-02-11 RX ORDER — AMLODIPINE BESYLATE 5 MG/1
5 TABLET ORAL DAILY
COMMUNITY
Start: 2024-12-14

## 2025-02-11 NOTE — PROGRESS NOTES
Heaven Amado comes today for her follow-up breast exam.  She has had no new breast complaints.  She reports no new palpable masses.  There is no skin or nipple changes.  There is no nipple discharge.  She has no appreciable evidence of supraclavicular or axillary adenopathy.    Patient Active Problem List    Diagnosis Date Noted    Multiple trauma 06/03/2022    Mass of upper outer quadrant of right breast 06/26/2023    CPAP (continuous positive airway pressure) dependence 08/10/2018    NATALIYA (obstructive sleep apnea)     Ganglion cyst 03/09/2017    History of colon polyps 11/13/2013    Constipation 11/13/2013    Excessive gas 11/13/2013    Anemia 11/13/2013       Current Outpatient Medications   Medication Sig Dispense Refill    Loratadine (CLARITIN PO)       atorvastatin (LIPITOR) 10 MG tablet Take 1 tablet by mouth once daily Orally Once a day for 90 days      amLODIPine (NORVASC) 5 MG tablet Take 1 tablet by mouth daily      irbesartan-hydroCHLOROthiazide (AVALIDE) 150-12.5 MG per tablet Take 1 tablet by mouth daily      colestipol (COLESTID) 1 g tablet       Melatonin 12 MG TABS       albuterol sulfate HFA (PROVENTIL;VENTOLIN;PROAIR) 108 (90 Base) MCG/ACT inhaler Inhale 2 puffs into the lungs 4 times daily 18 g 3    citalopram (CELEXA) 20 MG tablet       pantoprazole (PROTONIX) 40 MG tablet Take 1 tablet by mouth daily 30 tablet 3    levothyroxine (SYNTHROID) 50 MCG tablet Take 1 tablet by mouth Daily      allopurinol (ZYLOPRIM) 100 MG tablet Take 1 tablet by mouth daily      rOPINIRole (REQUIP) 0.5 MG tablet Take 1 tablet by mouth nightly       No current facility-administered medications for this visit.       Allergies Latex, Formaldehyde, Prozac [fluoxetine], Unable to assess, and Iodine    Past Medical History:   Diagnosis Date    Anemia     Anxiety state, unspecified     Arthritis     Asthma     Atypical chest pain     Colon polyps 4/11    Cough     CPAP (continuous positive airway pressure) dependence

## 2025-05-06 ENCOUNTER — OFFICE VISIT (OUTPATIENT)
Dept: PULMONOLOGY | Age: 70
End: 2025-05-06
Payer: MEDICARE

## 2025-05-06 VITALS
TEMPERATURE: 98 F | HEIGHT: 62 IN | BODY MASS INDEX: 37.94 KG/M2 | SYSTOLIC BLOOD PRESSURE: 128 MMHG | OXYGEN SATURATION: 98 % | DIASTOLIC BLOOD PRESSURE: 83 MMHG | HEART RATE: 75 BPM | WEIGHT: 206.2 LBS

## 2025-05-06 DIAGNOSIS — Z99.89 CPAP (CONTINUOUS POSITIVE AIRWAY PRESSURE) DEPENDENCE: ICD-10-CM

## 2025-05-06 DIAGNOSIS — G47.33 OSA (OBSTRUCTIVE SLEEP APNEA): Primary | ICD-10-CM

## 2025-05-06 DIAGNOSIS — E66.9 OBESITY (BMI 30-39.9): ICD-10-CM

## 2025-05-06 DIAGNOSIS — G47.10 HYPERSOMNIA: ICD-10-CM

## 2025-05-06 DIAGNOSIS — K21.9 GASTROESOPHAGEAL REFLUX DISEASE WITHOUT ESOPHAGITIS: ICD-10-CM

## 2025-05-06 DIAGNOSIS — G47.8 NON-RESTORATIVE SLEEP: ICD-10-CM

## 2025-05-06 DIAGNOSIS — J45.20 MILD INTERMITTENT ASTHMA, UNSPECIFIED WHETHER COMPLICATED: ICD-10-CM

## 2025-05-06 PROCEDURE — 1123F ACP DISCUSS/DSCN MKR DOCD: CPT | Performed by: NURSE PRACTITIONER

## 2025-05-06 PROCEDURE — G8399 PT W/DXA RESULTS DOCUMENT: HCPCS | Performed by: NURSE PRACTITIONER

## 2025-05-06 PROCEDURE — G8417 CALC BMI ABV UP PARAM F/U: HCPCS | Performed by: NURSE PRACTITIONER

## 2025-05-06 PROCEDURE — 99214 OFFICE O/P EST MOD 30 MIN: CPT | Performed by: NURSE PRACTITIONER

## 2025-05-06 PROCEDURE — 1036F TOBACCO NON-USER: CPT | Performed by: NURSE PRACTITIONER

## 2025-05-06 PROCEDURE — 1090F PRES/ABSN URINE INCON ASSESS: CPT | Performed by: NURSE PRACTITIONER

## 2025-05-06 PROCEDURE — G8428 CUR MEDS NOT DOCUMENT: HCPCS | Performed by: NURSE PRACTITIONER

## 2025-05-06 PROCEDURE — 3017F COLORECTAL CA SCREEN DOC REV: CPT | Performed by: NURSE PRACTITIONER

## 2025-05-06 ASSESSMENT — ENCOUNTER SYMPTOMS
COUGH: 0
SHORTNESS OF BREATH: 0
EYES NEGATIVE: 1
ALLERGIC/IMMUNOLOGIC NEGATIVE: 1
WHEEZING: 1
GASTROINTESTINAL NEGATIVE: 1

## 2025-05-06 NOTE — PROGRESS NOTES
Pulmonary and Sleep Medicine    Heaven Amado (:  1955) is a 69 y.o. female,Established patient, here for evaluation of the following chief complaint(s):  3 Month Follow-Up (Jah on cpap, DME report )      Referring physician:  No referring provider defined for this encounter.     ASSESSMENT/PLAN:  1. JAH (obstructive sleep apnea)  2. CPAP (continuous positive airway pressure) dependence  3. Obesity (BMI 30-39.9)  4. Non-restorative sleep  5. Hypersomnia  6. Mild intermittent asthma, unspecified whether complicated  7. Gastroesophageal reflux disease without esophagitis        Continue management with CPAP as it helps and she is compliant. Discussed rescue inhaler use as needed for asthma. Continue PPI for GERD.        Return in about 6 months (around 2025).    SUBJECTIVE/OBJECTIVE:        HPI    Patient presents for follow up for obstructive sleep apnea and asthma. She is using Flonase and feels cough is improved. She has wheezing occasionally but no shortness of breath. She does not typically use rescue inhaler.   My interpretation of compliance download is that she is using CPAP an average of 6 hours and 44 minutes. Apnea-hypopnea index with CPAP is 4.4 events per hour. She feels CPAP helps significantly.     Continue the following medications as reported by the patient:    Prior to Visit Medications    Medication Sig Taking? Authorizing Provider   Multiple Vitamins-Minerals (EYE VITAMINS & MINERALS PO) Take by mouth daily Yes Shayy Gómez MD EQ BUDESONIDE NASAL NA by Nasal route Yes Shayy Gómez MD   Loratadine (CLARITIN PO)  Yes Shayy Gómez MD   atorvastatin (LIPITOR) 10 MG tablet Take 1 tablet by mouth once daily Orally Once a day for 90 days Yes Shayy Gómez MD   amLODIPine (NORVASC) 5 MG tablet Take 1 tablet by mouth daily Yes Shayy Gómez MD   irbesartan-hydroCHLOROthiazide (AVALIDE) 150-12.5 MG per tablet Take 1 tablet by mouth daily Yes

## 2025-06-24 RX ORDER — ACETAMINOPHEN 325 MG/1
650 TABLET ORAL EVERY 6 HOURS SCHEDULED
COMMUNITY

## 2025-06-24 RX ORDER — ATORVASTATIN CALCIUM 10 MG/1
1 TABLET, FILM COATED ORAL DAILY
COMMUNITY
Start: 2025-04-12

## 2025-07-10 ENCOUNTER — OFFICE VISIT (OUTPATIENT)
Dept: PULMONOLOGY | Facility: CLINIC | Age: 70
End: 2025-07-10
Payer: MEDICARE

## 2025-07-10 ENCOUNTER — PROCEDURE VISIT (OUTPATIENT)
Dept: PULMONOLOGY | Facility: CLINIC | Age: 70
End: 2025-07-10
Payer: MEDICARE

## 2025-07-10 VITALS
BODY MASS INDEX: 38.64 KG/M2 | DIASTOLIC BLOOD PRESSURE: 72 MMHG | SYSTOLIC BLOOD PRESSURE: 139 MMHG | HEIGHT: 62 IN | HEART RATE: 89 BPM | WEIGHT: 210 LBS | OXYGEN SATURATION: 98 %

## 2025-07-10 DIAGNOSIS — J45.20 MILD INTERMITTENT ASTHMA WITHOUT COMPLICATION: Primary | ICD-10-CM

## 2025-07-10 DIAGNOSIS — J45.20 MILD INTERMITTENT ASTHMA WITHOUT COMPLICATION: ICD-10-CM

## 2025-07-10 DIAGNOSIS — G47.33 OSA ON CPAP: ICD-10-CM

## 2025-07-10 RX ORDER — TRIAMTERENE AND HYDROCHLOROTHIAZIDE 37.5; 25 MG/1; MG/1
1 TABLET ORAL DAILY
COMMUNITY
End: 2025-07-10

## 2025-07-10 RX ORDER — CANDESARTAN CILEXETIL AND HYDROCHLOROTHIAZIDE 16; 12.5 MG/1; MG/1
1 TABLET ORAL EVERY 24 HOURS
COMMUNITY
End: 2025-07-10

## 2025-07-10 RX ORDER — ZOLPIDEM TARTRATE 10 MG/1
10 TABLET ORAL NIGHTLY PRN
COMMUNITY
End: 2025-07-10

## 2025-07-10 RX ORDER — LORATADINE 10 MG/1
10 TABLET ORAL DAILY
COMMUNITY
Start: 2025-04-01

## 2025-07-10 RX ORDER — ESTRADIOL 1 MG/1
1 TABLET ORAL EVERY 24 HOURS
COMMUNITY
End: 2025-07-10

## 2025-07-10 NOTE — PROCEDURES
Spirometry with Diffusion Capacity    Performed by: Manuel Loja CMA  Authorized by: Alyx Sanford APRN

## 2025-07-10 NOTE — PROCEDURES
Spirometry with Diffusion Capacity    Performed by: Manuel Loja CMA  Authorized by: Alyx Sanford APRN     Pre Drug % Predicted    FVC: 67%   FEV1: 65%   FEF 25-75%: 57%   FEV1/FVC: 76%   DLCO: 85%   D/VAsb: 102%    Interpretation   Spirometry   Spirometry shows moderate restriction. There is reduced midflow suggesting small airway/airflow obstruction.   Review of FVL curve   Patient's effort is normal.   Diffusion Capacity  The patient's diffusion capacity is normal.  Diffusion capacity is normal when corrected for alveolar volume.   Overall comments: compared to July 2024 FEV1 is stable at 65% predicted, FVC slightly improved at 67% predicted versus 63 and diffusion capacity remains normal when corrected for alveolar volume.

## 2025-07-10 NOTE — PROGRESS NOTES
" VIN Ross  [facility]   Pulmonary and Critical Care  [address] [place] [address]  Phone: [phone]  Fax: [phone]           Chief Complaint  Mild intermittent asthma without complication    Subjective        Salud Daley presents to [facility] PULMONARY & CRITICAL CARE MEDICINE     History of Present Illness  Ms. Daley is a pleasant 69-year-old female with known mild intermittent asthma and cough. She also has known obstructive sleep apnea and she is compliant with CPAP use.    She has been using Rhinocort nasal spray more frequently than her inhalers, which she rarely uses. She has not required any emergency room or urgent care visits for her breathing in the past year and has not needed steroids or antibiotics. She experiences nasal drainage and uses the nasal spray as needed. She continues to use her CPAP machine at night, although she had to remove it last night due to difficulty sleeping. She reports no fevers, chills, or night sweats.     She is currently taking Claritin instead of Zyrtec. She received a notice from her allergist indicating that it has been a year since her last visit.    She has started taking over-the-counter fish oil 2000 mg daily as part of her diet.            Objective   Vital Signs:   /72   Pulse 89   Ht 157.5 cm (62\")   Wt 95.3 kg (210 lb)   SpO2 98% Comment: ra  BMI 38.41 kg/m²     Physical Exam  Vitals reviewed.   Constitutional:       Appearance: Normal appearance. She is obese.   Cardiovascular:      Rate and Rhythm: Normal rate and regular rhythm.   Pulmonary:      Effort: Pulmonary effort is normal.      Breath sounds: Normal breath sounds.   Neurological:      General: No focal deficit present.      Mental Status: She is alert and oriented to person, place, and time.   Psychiatric:         Mood and Affect: Mood normal.         Behavior: Behavior normal.             Result Review :  The following " data was reviewed by: VIN Ross on 07/10/2025:    My interpretation of imaging: No new  My interpretation of labs: No new    PFT Values          7/11/2024    15:30 7/10/2025    10:15   Pre Drug PFT Results   FVC 63 67   FEV1 64 65   FEF 25-75% 62 57   FEV1/FVC 79 76   Other Tests PFT Results   DLCO 96 85   D/VAsb 112 102     My interpretation of the PFT : as below    Results for orders placed in visit on 07/10/25    Spirometry with Diffusion Capacity    Narrative  Spirometry with Diffusion Capacity    Performed by: Manuel Loja CMA  Authorized by: Alyx Sanford APRN  Pre Drug % Predicted  FVC: 67%  FEV1: 65%  FEF 25-75%: 57%  FEV1/FVC: 76%  DLCO: 85%  D/VAsb: 102%    Interpretation  Spirometry  Spirometry shows moderate restriction. There is reduced midflow suggesting small airway/airflow obstruction.  Review of FVL curve  Patient's effort is normal.  Diffusion Capacity  The patient's diffusion capacity is normal.  Diffusion capacity is normal when corrected for alveolar volume.  Overall comments: compared to July 2024 FEV1 is stable at 65% predicted, FVC slightly improved at 67% predicted versus 63 and diffusion capacity remains normal when corrected for alveolar volume.      Spirometry with Diffusion Capacity    Narrative  Spirometry with Diffusion Capacity    Performed by: Manuel Loja CMA  Authorized by: Alyx Sanford APRN      Results for orders placed in visit on 07/11/24    Spirometry with Diffusion Capacity    Narrative  Spirometry with Diffusion Capacity    Performed by: Tiffany Vu RRT  Authorized by: Alyx Sanford APRN  Pre Drug % Predicted  FVC: 63%  FEV1: 64%  FEF 25-75%: 62%  FEV1/FVC: 79%  DLCO: 96%  D/VAsb: 112%    Interpretation  Spirometry  Spirometry shows moderate restriction.  Review of FVL curve  Patient's effort is normal.  Diffusion Capacity  The patient's diffusion capacity is normal.  Diffusion capacity is normal when corrected for  alveolar volume.  Overall comments: , Trace compared to November 2021 and although remains moderately restricted her FEV1 has dropped from 73 to 64% predicted and FVC has dropped from 77 to 63% predicted.  Diffusion capacity is compared to February 2020 and remains normal when corrected for alveolar volume with a drop from 143 to 112% predicted.          Assessment and Plan   Diagnoses and all orders for this visit:    1. Mild intermittent asthma without complication (Primary)  -     Spirometry with Diffusion Capacity    2. MANUEL on CPAP      Continue as needed albuterol, continue Rhinocort.  Reviewed her pulmonary function study today which is stable.  I have asked her to return in 1 year.  She is going to contact the allergy office and schedule her first visit with Dr. Painter since Dr. Valdez retired.  If she decides to stay with their office she will cancel her follow-up with me and just follow-up here as needed.  If she decides to continue to follow-up with us we will plan flow-volume loop and diffusion every other year.    Alpha 1: MM, normal  LDCT: never smoker   Smoking Cessation: never smoker       Follow Up   No follow-ups on file.  Patient was given instructions and counseling regarding her condition or for health maintenance advice. Please see specific information pulled into the AVS if appropriate.     VIN Ross  7/10/2025  10:33 CDT    Please note that portions of this note were completed with a voice recognition program.    Patient or patient representative verbalized consent for the use of Ambient Listening during the visit with  VIN Ross for chart documentation. 7/10/2025  10:48 CDT

## (undated) DEVICE — VELCLOSE LATEX FREE VELCRO ELASTIC BANDAGE 4INX5YD: Brand: VELCLOSE

## (undated) DEVICE — SENSR O2 OXIMAX FNGR A/ 18IN NONSTR

## (undated) DEVICE — FRCP BX RADJAW4 NDL 2.8 240 STD OG

## (undated) DEVICE — Device: Brand: DEFENDO AIR/WATER/SUCTION AND BIOPSY VALVE

## (undated) DEVICE — THE CHANNEL CLEANING BRUSH IS A NYLON FLEXI BRUSH ATTACHED TO A FLEXIBLE PLASTIC SHEATH DESIGNED TO SAFELY REMOVE DEBRIS FROM FLEXIBLE ENDOSCOPES.

## (undated) DEVICE — STERILE POLYISOPRENE POWDER-FREE SURGICAL GLOVES: Brand: PROTEXIS

## (undated) DEVICE — SUTURE VCRL SZ 2-0 L27IN ABSRB UD L26MM SH 1/2 CIR J417H

## (undated) DEVICE — MAJOR BSIN SETUP PK

## (undated) DEVICE — YANKAUER,BULB TIP WITH VENT: Brand: ARGYLE

## (undated) DEVICE — PADDING CAST W4INXL4YD ST COT RAYON MICROPLEATED HIGHLY

## (undated) DEVICE — MASK LG ADLT ANES MEDICHOICE

## (undated) DEVICE — AMBU AURAONCE U SIZE 3: Brand: AURAONCE

## (undated) DEVICE — AIRWAY CIRCUIT: Brand: DEROYAL

## (undated) DEVICE — SNAR POLYP CAPTIVATOR MICROHEX 13 240CM

## (undated) DEVICE — 9165 UNIVERSAL PATIENT PLATE: Brand: 3M™

## (undated) DEVICE — DISCONTINUED USE 416956 GLOVE 8.5 LTX ST TRIFLEX POWDER LK CF

## (undated) DEVICE — BANDAGE ESMARK 4IN ST

## (undated) DEVICE — THE SINGLE USE ETRAP – POLYP TRAP IS USED FOR SUCTION RETRIEVAL OF ENDOSCOPICALLY REMOVED POLYPS.: Brand: ETRAP

## (undated) DEVICE — CONMED GOLDLINE ELECTROSURGICAL HANDPIECE, HAND CONTROLLED WITH BLADE ELECTRODE, BUTTON SWITCH, SAFETY HOLSTER AND 10 FT (3 M) CABLE: Brand: CONMED GOLDLINE

## (undated) DEVICE — CURITY NON-ADHERENT STRIPS: Brand: CURITY

## (undated) DEVICE — CHLORAPREP 26ML ORANGE

## (undated) DEVICE — MASK,OXYGEN,MED CONC,ADLT,7' TUB, UC: Brand: PENDING

## (undated) DEVICE — U-DRAPE: Brand: CONVERTORS